# Patient Record
Sex: MALE | Race: WHITE | NOT HISPANIC OR LATINO | Employment: OTHER | URBAN - METROPOLITAN AREA
[De-identification: names, ages, dates, MRNs, and addresses within clinical notes are randomized per-mention and may not be internally consistent; named-entity substitution may affect disease eponyms.]

---

## 2017-03-10 ENCOUNTER — TELEPHONE (OUTPATIENT)
Dept: FAMILY MEDICINE | Facility: CLINIC | Age: 52
End: 2017-03-10

## 2017-03-10 NOTE — TELEPHONE ENCOUNTER
----- Message from Elly Quiroga sent at 3/10/2017 10:10 AM CST -----  Patient stated he is seeing blood in semen and would like appointment today/no appointment showing available/please call patient back at [phone] to schedule or advise.

## 2017-03-10 NOTE — TELEPHONE ENCOUNTER
Pt states blood in semen X few weeks, has not been in office since 2015, scheduled Monday for annual with problem

## 2017-03-13 ENCOUNTER — OFFICE VISIT (OUTPATIENT)
Dept: FAMILY MEDICINE | Facility: CLINIC | Age: 52
End: 2017-03-13

## 2017-03-13 VITALS
TEMPERATURE: 98 F | DIASTOLIC BLOOD PRESSURE: 80 MMHG | SYSTOLIC BLOOD PRESSURE: 130 MMHG | HEIGHT: 72 IN | WEIGHT: 282.63 LBS | BODY MASS INDEX: 38.28 KG/M2

## 2017-03-13 DIAGNOSIS — R36.1 HEMATOSPERMIA: Primary | ICD-10-CM

## 2017-03-13 DIAGNOSIS — E78.00 PURE HYPERCHOLESTEROLEMIA: ICD-10-CM

## 2017-03-13 DIAGNOSIS — E66.09 NON MORBID OBESITY DUE TO EXCESS CALORIES: ICD-10-CM

## 2017-03-13 PROCEDURE — 99999 PR PBB SHADOW E&M-EST. PATIENT-LVL III: CPT | Mod: PBBFAC,,, | Performed by: FAMILY MEDICINE

## 2017-03-13 PROCEDURE — 99214 OFFICE O/P EST MOD 30 MIN: CPT | Mod: S$PBB,,, | Performed by: FAMILY MEDICINE

## 2017-03-13 PROCEDURE — 99213 OFFICE O/P EST LOW 20 MIN: CPT | Mod: PBBFAC,PO | Performed by: FAMILY MEDICINE

## 2017-03-13 NOTE — PATIENT INSTRUCTIONS
If blood in the semen persists over the next few weeks, let us know and we will send you to urology

## 2017-03-13 NOTE — PROGRESS NOTES
Subjective:       Patient ID: David Aguilera is a 51 y.o. male.    Chief Complaint: Blood in semen (blood in semen episodic x 1 mo. Has been riding bike couple day/wk x 6 wk.)    HPI Comments: He is here for evaluation of blood in his semen.  He is noticed it several times over the past month.  There is been no pain.  He did have some perineal pain after bicycle ride several weeks ago.  He says he had a urinary infection several years ago.  He did see urology in 2009 for a vasectomy which was complicated by a left scrotal hematoma.  He has been in fairly good health.  He has gained some weight.  He has a history of hyperlipidemia.  He is due for lab work.    Review of Systems   Constitutional: Negative for unexpected weight change.   Respiratory: Negative for cough and shortness of breath.    Cardiovascular: Negative for chest pain.   Gastrointestinal: Negative for abdominal distention and abdominal pain.   Genitourinary: Negative for decreased urine volume, frequency, penile pain, testicular pain and urgency.       Objective:     Blood pressure 130/80, temperature 97.9 °F (36.6 °C), temperature source Oral, height 6' (1.829 m), weight 128.2 kg (282 lb 10.1 oz).      Physical Exam   Constitutional:   He is overweight.  No distress.   Cardiovascular: Normal rate, regular rhythm and normal heart sounds.    Pulmonary/Chest: Effort normal. No respiratory distress.   Abdominal: Soft.   No CVA or suprapubic tenderness.   Genitourinary:   Genitourinary Comments: Testes are normal.  Nontender.  Normal cord.  No hernia.  Urethral meatus is normal.  Poss state exam a small nontender with no nodules.   Neurological: He is alert.   Psychiatric: He has a normal mood and affect.       Assessment:       1. Hematospermia    2. Non morbid obesity due to excess calories    3. Pure hypercholesterolemia        Plan:       Urinalysis and lab work.  Consider urology evaluation if the medicine for me at does not resolve.  I tried to  reassure him.

## 2017-03-13 NOTE — MR AVS SNAPSHOT
[facility]  [address]  [place] [address]  Phone: [phone]  Fax: [phone]                  David Aguilera   3/13/2017 10:30 AM   Office Visit    Description:  Male : 1965   Provider:  Lobo Arroyo MD   Department:  [facility]           Reason for Visit     Blood in semen           Diagnoses this Visit        Comments    Hematospermia    -  Primary     Non morbid obesity due to excess calories         Pure hypercholesterolemia                To Do List           Goals (5 Years of Data)     None      [facility] On Call     [facility] On Call Nurse Care Line -  Assistance  Registered nurses in the [facility] On Call Center provide clinical advisement, health education, appointment booking, and other advisory services.  Call for this free service at [phone].             Medications           Message regarding Medications     Verify the changes and/or additions to your medication regime listed below are the same as discussed with your clinician today.  If any of these changes or additions are incorrect, please notify your healthcare provider.             Verify that the below list of medications is an accurate representation of the medications you are currently taking.  If none reported, the list may be blank. If incorrect, please contact your healthcare provider. Carry this list with you in case of emergency.           Current Medications     amphetamine-dextroamphetamine (AMPHETAMINE-DEXTROAMPHETAMINE) 30 mg Tab Take 1 tablet by mouth 2 (two) times daily.           Clinical Reference Information           Your Vitals Were     BP Temp Height Weight BMI    130/80 (BP Location: Right arm) 97.9 °F (36.6 °C) (Oral) 6' (1.829 m) 128.2 kg (282 lb 10.1 oz) 38.33 kg/m2      Blood Pressure          Most Recent Value    BP  130/80      Allergies as of 3/13/2017     No Known Allergies      Immunizations Administered on Date of Encounter - 3/13/2017      None      Orders Placed During Today's Visit     Future Labs/Procedures Expected by Expires    CBC Without Differential  3/13/2017 3/14/2018    Comprehensive metabolic panel  3/13/2017 3/14/2018    Lipid panel  3/13/2017 3/14/2018    Urinalysis  3/13/2017 5/12/2018      Instructions    If blood in the semen persists over the next few weeks, let us know and we will send you to urology       Language Assistance Services     ATTENTION: Language assistance services are available, free of charge. Please call [phone].      ATENCIÓN: Si habla español, tiene a steinberg disposición servicios gratuitos de asistencia lingüística. Llame al [phone].     CHÚ Ý: N?u b?n nói Ti?ng Vi?t, có các d?ch v? h? tr? ngôn ng? mi?n phí dành cho b?n. G?i s? [phone].         [facility] complies with applicable Federal civil rights laws and does not discriminate on the basis of race, color, national origin, age, disability, or sex.

## 2017-03-14 ENCOUNTER — LAB VISIT (OUTPATIENT)
Dept: LAB | Facility: HOSPITAL | Age: 52
End: 2017-03-14
Attending: FAMILY MEDICINE

## 2017-03-14 DIAGNOSIS — R36.1 HEMATOSPERMIA: ICD-10-CM

## 2017-03-14 LAB
BILIRUB UR QL STRIP: NEGATIVE
CLARITY UR: CLEAR
COLOR UR: YELLOW
GLUCOSE UR QL STRIP: NEGATIVE
HGB UR QL STRIP: ABNORMAL
KETONES UR QL STRIP: NEGATIVE
LEUKOCYTE ESTERASE UR QL STRIP: NEGATIVE
NITRITE UR QL STRIP: NEGATIVE
PH UR STRIP: 6 [PH] (ref 5–8)
PROT UR QL STRIP: NEGATIVE
SP GR UR STRIP: 1.02 (ref 1–1.03)
URN SPEC COLLECT METH UR: ABNORMAL

## 2017-03-14 PROCEDURE — 81003 URINALYSIS AUTO W/O SCOPE: CPT | Mod: PO

## 2017-03-20 ENCOUNTER — TELEPHONE (OUTPATIENT)
Dept: FAMILY MEDICINE | Facility: CLINIC | Age: 52
End: 2017-03-20

## 2017-03-20 DIAGNOSIS — R36.1 HEMATOSPERMIA: Primary | ICD-10-CM

## 2017-03-20 NOTE — TELEPHONE ENCOUNTER
appt scheduled and pt will call around to see if he can find something sooner. And will call if referral is needed.

## 2017-03-20 NOTE — TELEPHONE ENCOUNTER
Pt informed of labs results and informed pt that he should be receiving a letter in the mail  Pt also states that he still has blood in his semen

## 2017-03-20 NOTE — TELEPHONE ENCOUNTER
----- Message from Elly Hernandez sent at 3/20/2017  8:38 AM CDT -----  Patient is calling for lab test results. Please call patient back at  [phone].                . Thank you.

## 2018-05-21 DIAGNOSIS — Z12.11 COLON CANCER SCREENING: ICD-10-CM

## 2018-08-28 ENCOUNTER — OFFICE VISIT (OUTPATIENT)
Dept: FAMILY MEDICINE | Facility: CLINIC | Age: 53
End: 2018-08-28
Payer: COMMERCIAL

## 2018-08-28 VITALS
BODY MASS INDEX: 32.83 KG/M2 | WEIGHT: 242.38 LBS | HEIGHT: 72 IN | SYSTOLIC BLOOD PRESSURE: 120 MMHG | HEART RATE: 68 BPM | DIASTOLIC BLOOD PRESSURE: 72 MMHG

## 2018-08-28 DIAGNOSIS — H10.9 BACTERIAL CONJUNCTIVITIS OF LEFT EYE: Primary | ICD-10-CM

## 2018-08-28 PROCEDURE — 3008F BODY MASS INDEX DOCD: CPT | Mod: CPTII,S$GLB,, | Performed by: INTERNAL MEDICINE

## 2018-08-28 PROCEDURE — 99999 PR PBB SHADOW E&M-EST. PATIENT-LVL III: CPT | Mod: PBBFAC,,, | Performed by: INTERNAL MEDICINE

## 2018-08-28 PROCEDURE — 99213 OFFICE O/P EST LOW 20 MIN: CPT | Mod: S$GLB,,, | Performed by: INTERNAL MEDICINE

## 2018-08-28 RX ORDER — ERYTHROMYCIN 5 MG/G
OINTMENT OPHTHALMIC 3 TIMES DAILY
Qty: 1 G | Refills: 0 | Status: SHIPPED | OUTPATIENT
Start: 2018-08-28 | End: 2018-09-05 | Stop reason: ALTCHOICE

## 2018-08-28 NOTE — PROGRESS NOTES
Assessment and Plan:    1. Bacterial conjunctivitis of left eye  Appearance and history consistent with bacterial conjunctivitis. Discussed infection prevention, use of erythromycin ointment.   - erythromycin (ROMYCIN) ophthalmic ointment; Place into the left eye 3 (three) times daily.  Dispense: 1 g; Refill: 0      ______________________________________________________________________  Subjective:    Chief Complaint:  Possible pink eye    HPI:  David is a 52 y.o. year old man here for evaluation of possible pink eye. He is new to me but an established patient of Dr. Arroyo.     He notes that he has been having red eye on the left associated with significant thick discharge. Discharge has continued throughout the day. Notes that he has been having a sore throat and runny nose leading up to this. He has not had any visual disturbance or pain in his eye. He has not had any photophobia, no diplopia.     Medications:  Current Outpatient Medications on File Prior to Visit   Medication Sig Dispense Refill    amphetamine-dextroamphetamine (AMPHETAMINE-DEXTROAMPHETAMINE) 30 mg Tab Take 1 tablet by mouth 3 (three) times daily.        No current facility-administered medications on file prior to visit.        Review of Systems:  Review of Systems   Constitutional: Negative for chills and fever.   HENT: Positive for congestion and sore throat. Negative for sinus pressure and sinus pain.    Eyes: Positive for discharge and redness. Negative for photophobia, pain, itching and visual disturbance.   Respiratory: Negative for cough, shortness of breath and wheezing.        Past Medical History:  Past Medical History:   Diagnosis Date    ADD (attention deficit disorder with hyperactivity) 4/15/2013       Objective:    Vitals:  Vitals:    08/28/18 1419   BP: 120/72   Pulse: 68   Weight: 109.9 kg (242 lb 6.3 oz)   Height: 6' (1.829 m)   PainSc: 0-No pain       Physical Exam   Constitutional: He is oriented to person, place, and time.  He appears well-developed and well-nourished. No distress.   HENT:   Mouth/Throat: Oropharynx is clear and moist.   Eyes: EOM are normal. Pupils are equal, round, and reactive to light. Right eye exhibits no discharge. Left eye exhibits discharge (thick, yellow/green).   Cardiovascular: Normal rate and regular rhythm.   Pulmonary/Chest: Effort normal. No respiratory distress.   Neurological: He is alert and oriented to person, place, and time.   Skin: Skin is warm and dry.   Psychiatric: He has a normal mood and affect. His behavior is normal.   Vitals reviewed.        Li Rg MD  Internal Medicine

## 2018-09-05 ENCOUNTER — OFFICE VISIT (OUTPATIENT)
Dept: FAMILY MEDICINE | Facility: CLINIC | Age: 53
End: 2018-09-05
Payer: COMMERCIAL

## 2018-09-05 VITALS
BODY MASS INDEX: 32.76 KG/M2 | WEIGHT: 241.88 LBS | HEIGHT: 72 IN | HEART RATE: 64 BPM | SYSTOLIC BLOOD PRESSURE: 124 MMHG | DIASTOLIC BLOOD PRESSURE: 78 MMHG

## 2018-09-05 DIAGNOSIS — Z00.00 PREVENTATIVE HEALTH CARE: Primary | ICD-10-CM

## 2018-09-05 DIAGNOSIS — R36.1 HEMATOSPERMIA: ICD-10-CM

## 2018-09-05 LAB
ALBUMIN SERPL BCP-MCNC: 4 G/DL
ALP SERPL-CCNC: 73 U/L
ALT SERPL W/O P-5'-P-CCNC: 32 U/L
ANION GAP SERPL CALC-SCNC: 6 MMOL/L
AST SERPL-CCNC: 16 U/L
BILIRUB SERPL-MCNC: 0.9 MG/DL
BILIRUB SERPL-MCNC: NORMAL MG/DL
BLOOD URINE, POC: 50
BUN SERPL-MCNC: 18 MG/DL
CALCIUM SERPL-MCNC: 9.5 MG/DL
CHLORIDE SERPL-SCNC: 104 MMOL/L
CHOLEST SERPL-MCNC: 190 MG/DL
CHOLEST/HDLC SERPL: 3.1 {RATIO}
CO2 SERPL-SCNC: 29 MMOL/L
COLOR, POC UA: NORMAL
COMPLEXED PSA SERPL-MCNC: 0.28 NG/ML
CREAT SERPL-MCNC: 0.9 MG/DL
EST. GFR  (AFRICAN AMERICAN): >60 ML/MIN/1.73 M^2
EST. GFR  (NON AFRICAN AMERICAN): >60 ML/MIN/1.73 M^2
GLUCOSE SERPL-MCNC: 79 MG/DL
GLUCOSE UR QL STRIP: NORMAL
HDLC SERPL-MCNC: 62 MG/DL
HDLC SERPL: 32.6 %
KETONES UR QL STRIP: NORMAL
LDLC SERPL CALC-MCNC: 114.2 MG/DL
LEUKOCYTE ESTERASE URINE, POC: NORMAL
NITRITE, POC UA: NORMAL
NONHDLC SERPL-MCNC: 128 MG/DL
PH, POC UA: 5
POTASSIUM SERPL-SCNC: 4.5 MMOL/L
PROT SERPL-MCNC: 6.9 G/DL
PROTEIN, POC: NORMAL
SODIUM SERPL-SCNC: 139 MMOL/L
SPECIFIC GRAVITY, POC UA: 1.03
TRIGL SERPL-MCNC: 69 MG/DL
TSH SERPL DL<=0.005 MIU/L-ACNC: 2.22 UIU/ML
UROBILINOGEN, POC UA: NORMAL

## 2018-09-05 PROCEDURE — 90715 TDAP VACCINE 7 YRS/> IM: CPT | Mod: S$GLB,,, | Performed by: INTERNAL MEDICINE

## 2018-09-05 PROCEDURE — 99396 PREV VISIT EST AGE 40-64: CPT | Mod: 25,S$GLB,, | Performed by: INTERNAL MEDICINE

## 2018-09-05 PROCEDURE — 80053 COMPREHEN METABOLIC PANEL: CPT

## 2018-09-05 PROCEDURE — 81002 URINALYSIS NONAUTO W/O SCOPE: CPT | Mod: S$GLB,,, | Performed by: INTERNAL MEDICINE

## 2018-09-05 PROCEDURE — 87491 CHLMYD TRACH DNA AMP PROBE: CPT

## 2018-09-05 PROCEDURE — 80061 LIPID PANEL: CPT

## 2018-09-05 PROCEDURE — 90472 IMMUNIZATION ADMIN EACH ADD: CPT | Mod: S$GLB,,, | Performed by: INTERNAL MEDICINE

## 2018-09-05 PROCEDURE — 87086 URINE CULTURE/COLONY COUNT: CPT

## 2018-09-05 PROCEDURE — 84443 ASSAY THYROID STIM HORMONE: CPT

## 2018-09-05 PROCEDURE — 90686 IIV4 VACC NO PRSV 0.5 ML IM: CPT | Mod: S$GLB,,, | Performed by: INTERNAL MEDICINE

## 2018-09-05 PROCEDURE — 90471 IMMUNIZATION ADMIN: CPT | Mod: S$GLB,,, | Performed by: INTERNAL MEDICINE

## 2018-09-05 PROCEDURE — 99999 PR PBB SHADOW E&M-EST. PATIENT-LVL IV: CPT | Mod: PBBFAC,,, | Performed by: INTERNAL MEDICINE

## 2018-09-05 PROCEDURE — 84153 ASSAY OF PSA TOTAL: CPT

## 2018-09-05 NOTE — PROGRESS NOTES
Assessment and Plan:    1. Preventative health care  Discussed age appropriate preventative healthcare items including avoidance of tobacco, excessive alcohol consumption, and illicit drugs. Discussed safe sex practices. Discussed appropriate use of sunscreen, seatbelts, etc. Discussed maintenance of a healthy weight.   UTD on vaccines after today.   Due for colonoscopy per his report.   - Case request GI: COLONOSCOPY  - Tdap Vaccine  - Comprehensive metabolic panel; Future  - Lipid panel; Future  - CBC auto differential; Future  - TSH; Future  - PSA, Screening; Future  -  2. Hematospermia  Discussed that hematospermia is generally benign, but as he is having this now for the second time, definitely recommend following up with Urology. No evidence of prostatitis clinically on exam today. Will send UA, culture, GC/CT, PSA, and have patient follow up with Urology.   - PSA, Screening; Future  - POCT URINE DIPSTICK WITHOUT MICROSCOPE  - C. trachomatis/N. gonorrhoeae by AMP DNA [facility]; Urine  - Ambulatory referral to Urology  - Urine culture        ______________________________________________________________________  Subjective:    Chief Complaint:  Annual exam, hematospermia    HPI:  David is a 52 y.o. year old man here for annual exam.     He takes Adderall per Dr. Wagner Arguello (psych), relatively high dose.     He has not had an annual exam in more than a year. He is generally active, swims a lot. Diet has been suboptimal, notes that he does not eat fruit or vegetables at all. He has been trying to lower caloric intake. He has lost about 60 lbs intentionally in the last year or so.     He notes that when he was last seen by Dr. Arroyo he had hematospermia, but this had resolved. In the last few weeks he has been having blood in ejaculate again. No pain, no dysuria. No blood in urine at all. Also has bright red blood from his urethra whenever he has a firm bowel movement. He did not ever follow up with Urology.      Past Medical History:  Past Medical History:   Diagnosis Date    ADD (attention deficit disorder with hyperactivity) 4/15/2013       Past Surgical History:  Past Surgical History:   Procedure Laterality Date    L knee scope      L shoulder surgery      VASECTOMY         Family History:  Family History   Problem Relation Age of Onset    Stroke Mother 76       Social History:  Social History     Socioeconomic History    Marital status:      Spouse name: None    Number of children: None    Years of education: None    Highest education level: None   Social Needs    Financial resource strain: None    Food insecurity - worry: None    Food insecurity - inability: None    Transportation needs - medical: None    Transportation needs - non-medical: None   Occupational History    None   Tobacco Use    Smoking status: Never Smoker    Smokeless tobacco: Never Used   Substance and Sexual Activity    Alcohol use: No    Drug use: No    Sexual activity: Yes     Partners: Female   Other Topics Concern    None   Social History Narrative    None       Medications:  Current Outpatient Medications on File Prior to Visit   Medication Sig Dispense Refill    amphetamine-dextroamphetamine (AMPHETAMINE-DEXTROAMPHETAMINE) 30 mg Tab Take 1 tablet by mouth 3 (three) times daily.       [DISCONTINUED] erythromycin (ROMYCIN) ophthalmic ointment Place into the left eye 3 (three) times daily. 1 g 0     No current facility-administered medications on file prior to visit.        Allergies:  Patient has no known allergies.    Immunizations:  Immunization History   Administered Date(s) Administered    Influenza - Quadrivalent - PF 09/05/2018    Tdap 09/05/2018       Review of Systems:  Review of Systems   Constitutional: Negative for chills and fever.   Respiratory: Negative for shortness of breath and wheezing.    Cardiovascular: Negative for chest pain and leg swelling.   Gastrointestinal: Positive for constipation.  Negative for abdominal pain, anal bleeding, blood in stool and diarrhea.   Genitourinary: Negative for difficulty urinating, discharge, dysuria, flank pain, frequency, genital sores, hematuria, penile pain, scrotal swelling, testicular pain and urgency.   Neurological: Negative for dizziness, seizures, syncope and light-headedness.       Objective:    Vitals:  Vitals:    09/05/18 1120   BP: 124/78   Pulse: 64   Weight: 109.7 kg (241 lb 13.5 oz)   Height: 6' (1.829 m)   PainSc: 0-No pain     Body mass index is 32.8 kg/m².      Physical Exam   Constitutional: He is oriented to person, place, and time. He appears well-developed and well-nourished. No distress.   HENT:   Mouth/Throat: Oropharynx is clear and moist. No oropharyngeal exudate.   Eyes: Conjunctivae are normal. Right eye exhibits no discharge. Left eye exhibits no discharge. No scleral icterus.   Neck: Neck supple. No tracheal deviation present. No thyromegaly present.   Cardiovascular: Normal rate, regular rhythm, normal heart sounds and intact distal pulses.   No murmur heard.  Pulmonary/Chest: Effort normal and breath sounds normal. No respiratory distress. He has no wheezes. He has no rales.   Abdominal: Soft. Bowel sounds are normal. He exhibits no distension and no mass. There is no tenderness. No hernia.   Genitourinary: Prostate normal. Prostate is not enlarged and not tender.   Musculoskeletal: He exhibits no edema.   Lymphadenopathy:     He has no cervical adenopathy.   Neurological: He is alert and oriented to person, place, and time.   Skin: Skin is warm and dry. He is not diaphoretic.   Psychiatric: He has a normal mood and affect. His behavior is normal. Judgment normal.   Calm and cooperative   Vitals reviewed.      Data:  Previous labs reviewed and pertinent for elevated total cholesterol.        Li Rg MD  Internal Medicine

## 2018-09-06 ENCOUNTER — TELEPHONE (OUTPATIENT)
Dept: FAMILY MEDICINE | Facility: CLINIC | Age: 53
End: 2018-09-06

## 2018-09-06 ENCOUNTER — LAB VISIT (OUTPATIENT)
Dept: LAB | Facility: HOSPITAL | Age: 53
End: 2018-09-06
Attending: INTERNAL MEDICINE
Payer: COMMERCIAL

## 2018-09-06 DIAGNOSIS — Z00.00 BLOOD TESTS FOR ROUTINE GENERAL PHYSICAL EXAMINATION: ICD-10-CM

## 2018-09-06 DIAGNOSIS — Z00.00 BLOOD TESTS FOR ROUTINE GENERAL PHYSICAL EXAMINATION: Primary | ICD-10-CM

## 2018-09-06 LAB
BASOPHILS # BLD AUTO: 0.03 K/UL
BASOPHILS NFR BLD: 0.3 %
C TRACH DNA SPEC QL NAA+PROBE: NOT DETECTED
DIFFERENTIAL METHOD: NORMAL
EOSINOPHIL # BLD AUTO: 0.1 K/UL
EOSINOPHIL NFR BLD: 1.4 %
ERYTHROCYTE [DISTWIDTH] IN BLOOD BY AUTOMATED COUNT: 12.5 %
HCT VFR BLD AUTO: 47.8 %
HGB BLD-MCNC: 15.5 G/DL
IMM GRANULOCYTES # BLD AUTO: 0.02 K/UL
IMM GRANULOCYTES NFR BLD AUTO: 0.2 %
LYMPHOCYTES # BLD AUTO: 2 K/UL
LYMPHOCYTES NFR BLD: 20.7 %
MCH RBC QN AUTO: 30.2 PG
MCHC RBC AUTO-ENTMCNC: 32.4 G/DL
MCV RBC AUTO: 93 FL
MONOCYTES # BLD AUTO: 0.8 K/UL
MONOCYTES NFR BLD: 8.3 %
N GONORRHOEA DNA SPEC QL NAA+PROBE: NOT DETECTED
NEUTROPHILS # BLD AUTO: 6.6 K/UL
NEUTROPHILS NFR BLD: 69.1 %
NRBC BLD-RTO: 0 /100 WBC
PLATELET # BLD AUTO: 219 K/UL
PMV BLD AUTO: 10.2 FL
RBC # BLD AUTO: 5.14 M/UL
WBC # BLD AUTO: 9.53 K/UL

## 2018-09-06 PROCEDURE — 85025 COMPLETE CBC W/AUTO DIFF WBC: CPT

## 2018-09-06 PROCEDURE — 36415 COLL VENOUS BLD VENIPUNCTURE: CPT | Mod: PN

## 2018-09-06 NOTE — TELEPHONE ENCOUNTER
Ronny with lab services advised that CBC had clotted, do you want redrawn at this time? Please advise

## 2018-09-07 ENCOUNTER — LAB VISIT (OUTPATIENT)
Dept: LAB | Facility: HOSPITAL | Age: 53
End: 2018-09-07
Attending: INTERNAL MEDICINE
Payer: COMMERCIAL

## 2018-09-07 ENCOUNTER — TELEPHONE (OUTPATIENT)
Dept: FAMILY MEDICINE | Facility: CLINIC | Age: 53
End: 2018-09-07

## 2018-09-07 DIAGNOSIS — R68.82 DECREASED LIBIDO: ICD-10-CM

## 2018-09-07 DIAGNOSIS — R68.82 DECREASED LIBIDO: Primary | ICD-10-CM

## 2018-09-07 LAB
BACTERIA UR CULT: NO GROWTH
TESTOST SERPL-MCNC: 272 NG/DL

## 2018-09-07 PROCEDURE — 84403 ASSAY OF TOTAL TESTOSTERONE: CPT

## 2018-09-07 PROCEDURE — 36415 COLL VENOUS BLD VENIPUNCTURE: CPT | Mod: PN

## 2018-09-07 NOTE — TELEPHONE ENCOUNTER
Will order testosterone lab, but please inform patient that I follow evidence based guidelines for testosterone supplementation.

## 2018-09-07 NOTE — TELEPHONE ENCOUNTER
----- Message from Khoi Rider LPN sent at 9/7/2018  2:25 PM CDT -----  Patient notified of results and would like to have testosterone checked. States that he asked for that specifically. Advised that message will be sent

## 2018-09-10 ENCOUNTER — OFFICE VISIT (OUTPATIENT)
Dept: UROLOGY | Facility: CLINIC | Age: 53
End: 2018-09-10
Payer: COMMERCIAL

## 2018-09-10 VITALS
SYSTOLIC BLOOD PRESSURE: 112 MMHG | BODY MASS INDEX: 33.68 KG/M2 | DIASTOLIC BLOOD PRESSURE: 70 MMHG | WEIGHT: 248.69 LBS | HEIGHT: 72 IN | HEART RATE: 76 BPM

## 2018-09-10 DIAGNOSIS — R79.89 LOW TESTOSTERONE LEVEL IN MALE: ICD-10-CM

## 2018-09-10 DIAGNOSIS — N40.0 BPH WITHOUT URINARY OBSTRUCTION: ICD-10-CM

## 2018-09-10 DIAGNOSIS — R36.1 HEMATOSPERMIA: Primary | ICD-10-CM

## 2018-09-10 LAB
BILIRUB SERPL-MCNC: NORMAL MG/DL
BLOOD URINE, POC: NORMAL
COLOR, POC UA: YELLOW
GLUCOSE UR QL STRIP: NORMAL
KETONES UR QL STRIP: NORMAL
LEUKOCYTE ESTERASE URINE, POC: NORMAL
NITRITE, POC UA: NORMAL
PH, POC UA: 7.5
PROTEIN, POC: NORMAL
SPECIFIC GRAVITY, POC UA: 1.02
UROBILINOGEN, POC UA: 2

## 2018-09-10 PROCEDURE — 3008F BODY MASS INDEX DOCD: CPT | Mod: CPTII,S$GLB,, | Performed by: UROLOGY

## 2018-09-10 PROCEDURE — 99999 PR PBB SHADOW E&M-EST. PATIENT-LVL III: CPT | Mod: PBBFAC,,, | Performed by: UROLOGY

## 2018-09-10 PROCEDURE — 96372 THER/PROPH/DIAG INJ SC/IM: CPT | Mod: S$GLB,,, | Performed by: UROLOGY

## 2018-09-10 PROCEDURE — 81002 URINALYSIS NONAUTO W/O SCOPE: CPT | Mod: S$GLB,,, | Performed by: UROLOGY

## 2018-09-10 PROCEDURE — 99204 OFFICE O/P NEW MOD 45 MIN: CPT | Mod: 25,S$GLB,, | Performed by: UROLOGY

## 2018-09-10 RX ORDER — TESTOSTERONE CYPIONATE 200 MG/ML
200 INJECTION, SOLUTION INTRAMUSCULAR
Status: COMPLETED | OUTPATIENT
Start: 2018-09-10 | End: 2018-09-10

## 2018-09-10 RX ORDER — TESTOSTERONE CYPIONATE 200 MG/ML
200 INJECTION, SOLUTION INTRAMUSCULAR
Qty: 10 ML | Refills: 2 | Status: SHIPPED | OUTPATIENT
Start: 2018-09-10 | End: 2018-12-10 | Stop reason: SDUPTHER

## 2018-09-10 RX ADMIN — TESTOSTERONE CYPIONATE 200 MG: 200 INJECTION, SOLUTION INTRAMUSCULAR at 02:09

## 2018-09-10 NOTE — PROGRESS NOTES
Testosterone 200 mg injection given, see MARS. Educated patient about Adverse Medication Reaction and instructed patient to wait for 15 minutes after injection.  No adverse reactions reported

## 2018-09-10 NOTE — PROGRESS NOTES
Subjective:       Patient ID: David Aguilera is a 52 y.o. male.    Chief Complaint: Advice Only    HPI     52 year old with hematospermia beginning last week.  He says the semen was bright red in color.  He had a similar episode last year.  He has no other symptoms.   He denies gross hematuria and dysuria.  He has no obstructive symptoms.  He has no family history of prostate cancer.   Last PSA 0.28.   He also complains of symptoms of low testosterone.  GUMARO 8/10 positive.  He complains of weak erection.  Baseline testosterone is 272.  Urine dipstick shows negative for all components.      Component PSA, SCREEN   Latest Ref Rng & Units 0.00 - 4.00 ng/mL   9/5/2018 0.28       Past Medical History:   Diagnosis Date    ADD (attention deficit disorder with hyperactivity) 4/15/2013     Past Surgical History:   Procedure Laterality Date    L knee scope      L shoulder surgery      VASECTOMY         Current Outpatient Medications:     amphetamine-dextroamphetamine (AMPHETAMINE-DEXTROAMPHETAMINE) 30 mg Tab, Take 1 tablet by mouth 3 (three) times daily. , Disp: , Rfl:     testosterone cypionate (DEPOTESTOTERONE CYPIONATE) 200 mg/mL injection, Inject 1 mL (200 mg total) into the muscle every 14 (fourteen) days., Disp: 10 mL, Rfl: 2  No current facility-administered medications for this visit.     Review of Systems   Constitutional: Negative for fever.   Eyes: Negative for visual disturbance.   Respiratory: Negative for shortness of breath.    Cardiovascular: Negative for chest pain.   Gastrointestinal: Negative for nausea.   Genitourinary: Negative for dysuria and hematuria.   Musculoskeletal: Negative for gait problem.   Skin: Negative for rash.   Neurological: Negative for seizures.   Psychiatric/Behavioral: Negative for confusion.       Objective:      Physical Exam   Constitutional: He is oriented to person, place, and time. He appears well-developed and well-nourished.   HENT:   Head: Normocephalic and atraumatic.    Eyes: Conjunctivae are normal.   Cardiovascular: Normal rate.   Pulmonary/Chest: Effort normal.   Abdominal: Hernia confirmed negative in the right inguinal area and confirmed negative in the left inguinal area.   Genitourinary: Testes normal and penis normal. Rectal exam shows no mass and anal tone normal. Prostate is enlarged (30g, s/s/a). Prostate is not tender.   Musculoskeletal: Normal range of motion. He exhibits no edema.   Lymphadenopathy: No inguinal adenopathy noted on the right or left side.   Neurological: He is alert and oriented to person, place, and time.   Skin: Skin is warm and dry. No rash noted.   Psychiatric: He has a normal mood and affect.   Vitals reviewed.      Assessment:       1. Hematospermia    2. Low testosterone level in male    3. BPH without urinary obstruction        Plan:       Hematospermia  -     POCT URINE DIPSTICK WITHOUT MICROSCOPE    Low testosterone level in male  -     Testosterone; Future; Expected date: 12/11/2018  -     Hemoglobin; Future; Expected date: 12/11/2018  -     Hematocrit; Future; Expected date: 12/11/2018    BPH without urinary obstruction    Other orders  -     testosterone cypionate injection 200 mg; Inject 1 mL (200 mg total) into the muscle every 14 (fourteen) days.  -     testosterone cypionate (DEPOTESTOTERONE CYPIONATE) 200 mg/mL injection; Inject 1 mL (200 mg total) into the muscle every 14 (fourteen) days.  Dispense: 10 mL; Refill: 2      Reassurance re hematospermia, no treatment necessary  He wants to begin a trial of testosterone replacement.  Begin 200mg IM every 2 weeks.  RTC 3 months with repeat labs

## 2018-09-10 NOTE — LETTER
September 10, 2018      Li Rg MD  [address]  [facility] [address]           [facility]  [address] [facility] [address] [place] [address]  Phone: [phone]          Patient: David Aguilera   MR Number: [id number]   YOB: 1965   Date of Visit: 9/10/2018       Dear Dr. Li Rg:    Thank you for referring David Aguilera to me for evaluation. Attached you will find relevant portions of my assessment and plan of care.    If you have questions, please do not hesitate to call me. I look forward to following David Aguilera along with you.    Sincerely,    EDWIN Romero MD    Enclosure  CC:  No Recipients    If you would like to receive this communication electronically, please contact [email or web address] or [phone] to request more information on [facility] access.    For providers and/or their staff who would like to refer a patient to [facility], please contact us through our one-stop-shop provider referral line, [facility], at [phone].    If you feel you have received this communication in error or would no longer like to receive these types of communications, please e-mail [email or web address]

## 2018-11-01 ENCOUNTER — OFFICE VISIT (OUTPATIENT)
Dept: FAMILY MEDICINE | Facility: CLINIC | Age: 53
End: 2018-11-01
Payer: COMMERCIAL

## 2018-11-01 VITALS
BODY MASS INDEX: 33.22 KG/M2 | SYSTOLIC BLOOD PRESSURE: 120 MMHG | WEIGHT: 245.25 LBS | HEIGHT: 72 IN | OXYGEN SATURATION: 97 % | TEMPERATURE: 99 F | HEART RATE: 78 BPM | DIASTOLIC BLOOD PRESSURE: 78 MMHG

## 2018-11-01 DIAGNOSIS — L03.114 CELLULITIS OF LEFT UPPER EXTREMITY: ICD-10-CM

## 2018-11-01 DIAGNOSIS — L23.7 POISON IVY DERMATITIS: Primary | ICD-10-CM

## 2018-11-01 PROCEDURE — 96372 THER/PROPH/DIAG INJ SC/IM: CPT | Mod: S$GLB,,, | Performed by: NURSE PRACTITIONER

## 2018-11-01 PROCEDURE — 99214 OFFICE O/P EST MOD 30 MIN: CPT | Mod: 25,S$GLB,, | Performed by: NURSE PRACTITIONER

## 2018-11-01 PROCEDURE — 99999 PR PBB SHADOW E&M-EST. PATIENT-LVL IV: CPT | Mod: PBBFAC,,, | Performed by: NURSE PRACTITIONER

## 2018-11-01 PROCEDURE — 3008F BODY MASS INDEX DOCD: CPT | Mod: CPTII,S$GLB,, | Performed by: NURSE PRACTITIONER

## 2018-11-01 RX ORDER — PREDNISONE 20 MG/1
TABLET ORAL
Qty: 15 TABLET | Refills: 0 | Status: SHIPPED | OUTPATIENT
Start: 2018-11-01 | End: 2018-11-11

## 2018-11-01 RX ORDER — SULFAMETHOXAZOLE AND TRIMETHOPRIM 800; 160 MG/1; MG/1
1 TABLET ORAL 2 TIMES DAILY
Qty: 14 TABLET | Refills: 0 | Status: SHIPPED | OUTPATIENT
Start: 2018-11-01 | End: 2018-11-02

## 2018-11-01 RX ORDER — DEXAMETHASONE SODIUM PHOSPHATE 4 MG/ML
4 INJECTION, SOLUTION INTRA-ARTICULAR; INTRALESIONAL; INTRAMUSCULAR; INTRAVENOUS; SOFT TISSUE
Status: COMPLETED | OUTPATIENT
Start: 2018-11-01 | End: 2018-11-01

## 2018-11-01 RX ADMIN — DEXAMETHASONE SODIUM PHOSPHATE 4 MG: 4 INJECTION, SOLUTION INTRA-ARTICULAR; INTRALESIONAL; INTRAMUSCULAR; INTRAVENOUS; SOFT TISSUE at 10:11

## 2018-11-01 NOTE — PROGRESS NOTES
This dictation has been generated using Modal Fluency Dictation some phonetic errors may occur. Please contact author for clarification if needed.     Problem List Items Addressed This Visit     None      Visit Diagnoses     Poison ivy dermatitis    -  Primary    Relevant Medications    dexamethasone injection 4 mg (Completed)    Other Relevant Orders    Ambulatory referral to Allergy    Cellulitis of left upper extremity              Orders Placed This Encounter    Ambulatory referral to Allergy    predniSONE (DELTASONE) 20 MG tablet    sulfamethoxazole-trimethoprim 800-160mg (BACTRIM DS) 800-160 mg Tab    dexamethasone injection 4 mg     Poison ivy dermatitis and secondary cellulitis of the upper extremity.  Refer to allergist.  Patient wants to know about desensitization.  Marked poison ivy noted. Injection as above and follow with prednisone.  Complains of left lid he irritation but I do not see any evidence of rash.  No vision changes no Ophthalmology referral.  Secondary cellulitis developing on the upper extremities treat with Bactrim briefly as above.      Follow-up if symptoms worsen or fail to improve.    ________________________________________________________________  ________________________________________________________________      Chief Complaint   Patient presents with    Poison Ivy     History of present illness  This 53 y.o. presents today for complaint of rash.  Patient is new to me.  Follows upon my partners.  Patient notes presence of poison ivy.  He started about a month and a half ago.  He seems to improve after using a Medrol Dosepak however relapse.  He was working in the [employer].  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  Patient does note some left eye irritation.  He states he had a rash in the affected area previously.  Denies any vision changes.  He did try cleansing the skin after contact with poison ivy.  He also completed all of  Medrol Dosepak with improvement but not resolution.  He has relapse since then.  Review of systems  No fever or chills  No shortness of breath chest pain    Past Medical History:   Diagnosis Date    ADD (attention deficit disorder with hyperactivity) 4/15/2013       Past Surgical History:   Procedure Laterality Date    L knee scope      L shoulder surgery      VASECTOMY         Family History   Problem Relation Age of Onset    Stroke Mother 76       Social History     Socioeconomic History    Marital status:      Spouse name: None    Number of children: None    Years of education: None    Highest education level: None   Social Needs    Financial resource strain: None    Food insecurity - worry: None    Food insecurity - inability: None    Transportation needs - medical: None    Transportation needs - non-medical: None   Occupational History    None   Tobacco Use    Smoking status: Never Smoker    Smokeless tobacco: Never Used   Substance and Sexual Activity    Alcohol use: No    Drug use: No    Sexual activity: Yes     Partners: Female   Other Topics Concern    None   Social History Narrative    None       Current Outpatient Medications   Medication Sig Dispense Refill    amphetamine-dextroamphetamine (AMPHETAMINE-DEXTROAMPHETAMINE) 30 mg Tab Take 1 tablet by mouth 3 (three) times daily.       testosterone cypionate (DEPOTESTOTERONE CYPIONATE) 200 mg/mL injection Inject 1 mL (200 mg total) into the muscle every 14 (fourteen) days. 10 mL 2    predniSONE (DELTASONE) 20 MG tablet Take 1 tablet (20 mg total) by mouth 2 (two) times daily for 5 days, THEN 1 tablet (20 mg total) once daily for 5 days. 15 tablet 0    sulfamethoxazole-trimethoprim 800-160mg (BACTRIM DS) 800-160 mg Tab Take 1 tablet by mouth 2 (two) times daily. for 7 days 14 tablet 0     No current facility-administered medications for this visit.        Review of patient's allergies indicates:  No Known Allergies    Physical  examination  Vitals Reviewed  Gen. Well-dressed well-nourished   Skin warm dry and intact.  Poison ivy rash noted with evidence of excoriation.  It is present on the upper extremities.  There is a trace amount of erythematous changes on the left side of the neck.  I do not appreciate any rash around the left eye.  Neck is supple without adenopathy  Chest.  Respirations are even unlabored.     Neuro. Awake alert oriented x4.  Normal judgment and cognition noted.  Extremities no clubbing cyanosis or edema noted.     Call or return to clinic prn if these symptoms worsen or fail to improve as anticipated.

## 2018-11-05 ENCOUNTER — TELEPHONE (OUTPATIENT)
Dept: GASTROENTEROLOGY | Facility: CLINIC | Age: 53
End: 2018-11-05

## 2018-11-05 NOTE — TELEPHONE ENCOUNTER
----- Message from Markos Mcintosh sent at 11/5/2018  4:55 PM CST -----  Contact: [phone]  Patient stated he's scheduled to get colonoscopy done on 11/6/18, pharmacy have not received prep.      Patient will be using   [facility] - [place] - [address]  [address]  [place] [address]  Phone: [phone] Fax: [phone]    Please call patient at [phone].     Thanks!

## 2018-11-06 ENCOUNTER — ANESTHESIA EVENT (OUTPATIENT)
Dept: ENDOSCOPY | Facility: HOSPITAL | Age: 53
End: 2018-11-06
Payer: COMMERCIAL

## 2018-11-06 ENCOUNTER — ANESTHESIA (OUTPATIENT)
Dept: ENDOSCOPY | Facility: HOSPITAL | Age: 53
End: 2018-11-06
Payer: COMMERCIAL

## 2018-11-06 ENCOUNTER — HOSPITAL ENCOUNTER (OUTPATIENT)
Facility: HOSPITAL | Age: 53
Discharge: HOME | End: 2018-11-06
Attending: INTERNAL MEDICINE | Admitting: INTERNAL MEDICINE
Payer: COMMERCIAL

## 2018-11-06 VITALS
OXYGEN SATURATION: 98 % | BODY MASS INDEX: 32.23 KG/M2 | SYSTOLIC BLOOD PRESSURE: 120 MMHG | TEMPERATURE: 98 F | RESPIRATION RATE: 16 BRPM | DIASTOLIC BLOOD PRESSURE: 70 MMHG | WEIGHT: 238 LBS | HEIGHT: 72 IN | HEART RATE: 62 BPM

## 2018-11-06 DIAGNOSIS — Z12.11 COLON CANCER SCREENING: ICD-10-CM

## 2018-11-06 PROCEDURE — 63600175 PHARM REV CODE 636 W HCPCS: Mod: PO | Performed by: NURSE ANESTHETIST, CERTIFIED REGISTERED

## 2018-11-06 PROCEDURE — 37000009 HC ANESTHESIA EA ADD 15 MINS: Mod: PO | Performed by: INTERNAL MEDICINE

## 2018-11-06 PROCEDURE — G0121 COLON CA SCRN NOT HI RSK IND: HCPCS | Mod: PO | Performed by: INTERNAL MEDICINE

## 2018-11-06 PROCEDURE — 25000003 PHARM REV CODE 250: Mod: PO | Performed by: INTERNAL MEDICINE

## 2018-11-06 PROCEDURE — D9220A PRA ANESTHESIA: Mod: ,,, | Performed by: ANESTHESIOLOGY

## 2018-11-06 PROCEDURE — G0121 COLON CA SCRN NOT HI RSK IND: HCPCS | Mod: ,,, | Performed by: INTERNAL MEDICINE

## 2018-11-06 PROCEDURE — 37000008 HC ANESTHESIA 1ST 15 MINUTES: Mod: PO | Performed by: INTERNAL MEDICINE

## 2018-11-06 RX ORDER — SODIUM CHLORIDE 0.9 % (FLUSH) 0.9 %
3 SYRINGE (ML) INJECTION
Status: DISCONTINUED | OUTPATIENT
Start: 2018-11-06 | End: 2018-11-06 | Stop reason: HOSPADM

## 2018-11-06 RX ORDER — SODIUM CHLORIDE, SODIUM LACTATE, POTASSIUM CHLORIDE, CALCIUM CHLORIDE 600; 310; 30; 20 MG/100ML; MG/100ML; MG/100ML; MG/100ML
INJECTION, SOLUTION INTRAVENOUS CONTINUOUS
Status: DISCONTINUED | OUTPATIENT
Start: 2018-11-06 | End: 2018-11-06 | Stop reason: HOSPADM

## 2018-11-06 RX ORDER — LIDOCAINE HYDROCHLORIDE 10 MG/ML
1 INJECTION INFILTRATION; PERINEURAL ONCE
Status: COMPLETED | OUTPATIENT
Start: 2018-11-06 | End: 2018-11-06

## 2018-11-06 RX ORDER — LIDOCAINE HCL/PF 100 MG/5ML
SYRINGE (ML) INTRAVENOUS
Status: DISCONTINUED | OUTPATIENT
Start: 2018-11-06 | End: 2018-11-06

## 2018-11-06 RX ORDER — PROPOFOL 10 MG/ML
VIAL (ML) INTRAVENOUS
Status: DISCONTINUED | OUTPATIENT
Start: 2018-11-06 | End: 2018-11-06

## 2018-11-06 RX ADMIN — PROPOFOL 30 MG: 10 INJECTION, EMULSION INTRAVENOUS at 10:11

## 2018-11-06 RX ADMIN — LIDOCAINE HYDROCHLORIDE 75 MG: 20 INJECTION, SOLUTION INTRAVENOUS at 10:11

## 2018-11-06 RX ADMIN — LIDOCAINE HYDROCHLORIDE 1 ML: 10 INJECTION, SOLUTION EPIDURAL; INFILTRATION; INTRACAUDAL; PERINEURAL at 10:11

## 2018-11-06 RX ADMIN — PROPOFOL 30 MG: 10 INJECTION, EMULSION INTRAVENOUS at 11:11

## 2018-11-06 RX ADMIN — PROPOFOL 120 MG: 10 INJECTION, EMULSION INTRAVENOUS at 10:11

## 2018-11-06 RX ADMIN — SODIUM CHLORIDE, SODIUM LACTATE, POTASSIUM CHLORIDE, AND CALCIUM CHLORIDE: .6; .31; .03; .02 INJECTION, SOLUTION INTRAVENOUS at 10:11

## 2018-11-06 NOTE — DISCHARGE INSTRUCTIONS
Recovery After Procedural Sedation (Adult)  You have been given medicine by vein to make you sleep during your surgery. This may have included both a pain medicine and sleeping medicine. Most of the effects have worn off. But you may still have some drowsiness for the next 6 to 8 hours.  Home care  Follow these guidelines when you get home:  · For the next 8 hours, you should be watched by a responsible adult. This person should make sure your condition is not getting worse.  · Don't drink any alcohol for the next 24 hours.  · Don't drive, operate dangerous machinery, or make important business or personal decisions during the next 24 hours.  Note: Your healthcare provider may tell you not to take any medicine by mouth for pain or sleep in the next 4 hours. These medicines may react with the medicines you were given in the hospital. This could cause a much stronger response than usual.  Follow-up care  Follow up with your healthcare provider if you are not alert and back to your usual level of activity within 12 hours.  When to seek medical advice  Call your healthcare provider right away if any of these occur:  · Drowsiness gets worse  · Weakness or dizziness gets worse  · Repeated vomiting  · You can't be awakened   Date Last Reviewed: 10/18/2016  © 1396-8668 [employer]. [address] [place] [address], [place] [address]. All rights reserved. This information is not intended as a substitute for professional medical care. Always follow your healthcare professional's instructions.

## 2018-11-06 NOTE — [employer] PATIENT INSTRUCTIONS
Discharge Summary/Instructions after an Endoscopic Procedure  Patient Name: David Aguilera  Patient MRN: [id number]  Patient YOB: 1965  Tuesday, November 06, 2018  Kameron Chadwick MD  RESTRICTIONS:  During your procedure today, you received medications for sedation.  These   medications may affect your judgment, balance and coordination.  Therefore,   for 24 hours, you have the following restrictions:   - DO NOT drive a car, operate machinery, make legal/financial decisions,   sign important papers or drink alcohol.    ACTIVITY:  Today: no heavy lifting, straining or running due to procedural   sedation/anesthesia.  The following day: return to full activity including work.  DIET:  Eat and drink normally unless instructed otherwise.     TREATMENT FOR COMMON SIDE EFFECTS:  - Mild abdominal pain, nausea, belching, bloating or excessive gas:  rest,   eat lightly and use a heating pad.  - Sore Throat: treat with throat lozenges and/or gargle with warm salt   water.  - Because air was used during the procedure, expelling large amounts of air   from your rectum or belching is normal.  - If a bowel prep was taken, you may not have a bowel movement for 1-3 days.    This is normal.  SYMPTOMS TO WATCH FOR AND REPORT TO YOUR PHYSICIAN:  1. Abdominal pain or bloating, other than gas cramps.  2. Chest pain.  3. Back pain.  4. Signs of infection such as: chills or fever occurring within 24 hours   after the procedure.  5. Rectal bleeding, which would show as bright red, maroon, or black stools.   (A tablespoon of blood from the rectum is not serious, especially if   hemorrhoids are present.)  6. Vomiting.  7. Weakness or dizziness.  GO DIRECTLY TO THE NEAREST EMERGENCY ROOM IF YOU HAVE ANY OF THE FOLLOWING:      Difficulty breathing              Chills and/or fever over 101 F   Persistent vomiting and/or vomiting blood   Severe abdominal pain   Severe chest pain   Black, tarry stools   Bleeding- more than one  tablespoon   Any other symptom or condition that you feel may need urgent attention  Your doctor recommends these additional instructions:  If any biopsies were taken, your doctors clinic will contact you in 1 to 2   weeks with any results.  Your physician has recommended a repeat colonoscopy in 10 years for   screening purposes.   You are being discharged to home.  For questions, problems or results please call your physician - Kameron Chadwick MD at Work: [phone].  EMERGENCY PHONE NUMBER: [phone], LAB RESULTS: [phone]  IF A COMPLICATION OR EMERGENCY SITUATION ARISES AND YOU ARE UNABLE TO REACH   YOUR PHYSICIAN - GO DIRECTLY TO THE EMERGENCY ROOM.  ___________________________________________  Nurse Signature  ___________________________________________  Patient/Designated Responsible Party Signature  Kameron Chadwick MD  11/6/2018 11:08:59 AM  This report has been verified and signed electronically.  [employer]

## 2018-11-06 NOTE — DISCHARGE SUMMARY
Discharge Note  Short Stay      SUMMARY     Admit Date: 11/6/2018    Attending Physician: Kameron Chadwick MD     Discharge Physician: Kameron Chadwick MD    Discharge Date: 11/6/2018 11:09 AM    Final Diagnosis: Preventative health care [Z00.00]    Disposition: HOME OR SELF CARE    Patient Instructions:   Current Discharge Medication List      CONTINUE these medications which have NOT CHANGED    Details   dextroamphetamine-amphetamine (AMPHETAMINE SALT COMBO) 30 mg Tab Take 1 tablet by mouth 3 (three) times daily.       predniSONE (DELTASONE) 20 MG tablet Take 1 tablet (20 mg total) by mouth 2 (two) times daily for 5 days, THEN 1 tablet (20 mg total) once daily for 5 days.  Qty: 15 tablet, Refills: 0      testosterone cypionate (DEPOTESTOTERONE CYPIONATE) 200 mg/mL injection Inject 1 mL (200 mg total) into the muscle every 14 (fourteen) days.  Qty: 10 mL, Refills: 2             Discharge Procedure Orders (must include Diet, Follow-up, Activity)    Follow Up:  Follow up with PCP as previously scheduled  Resume routine diet.  Activity as tolerated.    No driving day of procedure.

## 2018-11-06 NOTE — ANESTHESIA PREPROCEDURE EVALUATION
11/06/2018  David Aguilera is a 53 y.o., male.    Anesthesia Evaluation    I have reviewed the Patient Summary Reports.    I have reviewed the Nursing Notes.   I have reviewed the Medications.     Review of Systems  Anesthesia Hx:  Denies Family Hx of Anesthesia complications.   Denies Personal Hx of Anesthesia complications.   Hepatic/GI:   Bowel Prep.    Psych:   Psychiatric History          Physical Exam  General:  Obesity    Airway/Jaw/Neck:  Airway Findings: Mouth Opening: Normal Tongue: Normal  General Airway Assessment: Adult  Mallampati: III  Improves to II with phonation.  TM Distance: Normal, at least 6 cm  Jaw/Neck Findings:  Neck ROM: Normal ROM      Dental:  Dental Findings: upper front caps   Chest/Lungs:  Chest/Lungs Clear    Heart/Vascular:  Heart Findings: Normal            Anesthesia Plan  Type of Anesthesia, risks & benefits discussed:  Anesthesia Type:  general  Patient's Preference:   Intra-op Monitoring Plan: standard ASA monitors  Intra-op Monitoring Plan Comments:   Post Op Pain Control Plan:   Post Op Pain Control Plan Comments:   Induction:   IV  Beta Blocker:  Patient is not currently on a Beta-Blocker (No further documentation required).       Informed Consent: Patient understands risks and agrees with Anesthesia plan.  Questions answered. Anesthesia consent signed with patient.  ASA Score: 2     Day of Surgery Review of History & Physical:    H&P update referred to the provider.         Ready For Surgery From Anesthesia Perspective.

## 2018-11-06 NOTE — H&P
History & Physical - Short Stay  Gastroenterology      SUBJECTIVE:     Procedure: Colonoscopy    Chief Complaint/Indication for Procedure: Screening    PTA Medications   Medication Sig    dextroamphetamine-amphetamine (AMPHETAMINE SALT COMBO) 30 mg Tab Take 1 tablet by mouth 3 (three) times daily.     predniSONE (DELTASONE) 20 MG tablet Take 1 tablet (20 mg total) by mouth 2 (two) times daily for 5 days, THEN 1 tablet (20 mg total) once daily for 5 days.    testosterone cypionate (DEPOTESTOTERONE CYPIONATE) 200 mg/mL injection Inject 1 mL (200 mg total) into the muscle every 14 (fourteen) days.       Review of patient's allergies indicates:  No Known Allergies     Past Medical History:   Diagnosis Date    ADD (attention deficit disorder with hyperactivity) 4/15/2013     Past Surgical History:   Procedure Laterality Date    L knee scope      L shoulder surgery      VASECTOMY       Family History   Problem Relation Age of Onset    Stroke Mother 76     Social History     Tobacco Use    Smoking status: Never Smoker    Smokeless tobacco: Never Used   Substance Use Topics    Alcohol use: No    Drug use: No         OBJECTIVE:     Vital Signs (Most Recent)  Temp: 97.5 °F (36.4 °C) (11/06/18 1034)  Pulse: 65 (11/06/18 1034)  Resp: 16 (11/06/18 1034)  BP: 129/83 (11/06/18 1034)  SpO2: 98 % (11/06/18 1034)    Physical Exam                                                        GENERAL:  Comfortable, in no acute distress.                                 HEENT EXAM:  Nonicteric.  No adenopathy.  Oropharynx is clear.               NECK:  Supple.                                                               LUNGS:  Clear.                                                               CARDIAC:  Regular rate and rhythm.  S1, S2.  No murmur.                      ABDOMEN:  Soft, positive bowel sounds, nontender.  No hepatosplenomegaly or masses.  No rebound or guarding.                                              EXTREMITIES:  No edema.     MENTAL STATUS:  Normal, alert and oriented.      ASSESSMENT/PLAN:     Assessment: Colorectal cancer screening    Plan: Colonoscopy    Anesthesia Plan: General    ASA Grade: ASA 2 - Patient with mild systemic disease with no functional limitations    MALLAMPATI SCORE:  I (soft palate, uvula, fauces, and tonsillar pillars visible)

## 2018-11-06 NOTE — ANESTHESIA POSTPROCEDURE EVALUATION
Anesthesia Post Evaluation    Patient: David A Hingle    Procedure(s) Performed: Procedure(s) (LRB):  COLONOSCOPY (N/A)    Final Anesthesia Type: general  Patient location during evaluation: PACU  Patient participation: Yes- Able to Participate  Level of consciousness: awake and alert, oriented and awake  Post-procedure vital signs: reviewed and stable  Pain management: adequate  Airway patency: patent  PONV status at discharge: No PONV  Anesthetic complications: no      Cardiovascular status: blood pressure returned to baseline and hemodynamically stable  Respiratory status: unassisted, spontaneous ventilation and room air  Hydration status: euvolemic  Follow-up not needed.        Visit Vitals  /70 (BP Location: Left arm)   Pulse 62   Temp 36.6 °C (97.8 °F) (Tympanic)   Resp 16   Ht 6' (1.829 m)   Wt 108 kg (238 lb)   SpO2 98%   BMI 32.28 kg/m²       Pain/Tremaine Score: Presence of Pain: denies (11/6/2018 11:32 AM)  Tremaine Score: 10 (11/6/2018 11:32 AM)

## 2018-12-10 ENCOUNTER — OFFICE VISIT (OUTPATIENT)
Dept: UROLOGY | Facility: CLINIC | Age: 53
End: 2018-12-10
Payer: COMMERCIAL

## 2018-12-10 VITALS
BODY MASS INDEX: 34.31 KG/M2 | WEIGHT: 253 LBS | HEART RATE: 75 BPM | SYSTOLIC BLOOD PRESSURE: 156 MMHG | DIASTOLIC BLOOD PRESSURE: 90 MMHG

## 2018-12-10 DIAGNOSIS — R79.89 LOW TESTOSTERONE IN MALE: Primary | ICD-10-CM

## 2018-12-10 LAB
BILIRUB SERPL-MCNC: ABNORMAL MG/DL
BLOOD URINE, POC: ABNORMAL
COLOR, POC UA: ABNORMAL
GLUCOSE UR QL STRIP: ABNORMAL
KETONES UR QL STRIP: ABNORMAL
LEUKOCYTE ESTERASE URINE, POC: ABNORMAL
NITRITE, POC UA: ABNORMAL
PH, POC UA: 6
PROTEIN, POC: ABNORMAL
SPECIFIC GRAVITY, POC UA: 1025
UROBILINOGEN, POC UA: 0.2

## 2018-12-10 PROCEDURE — 99999 PR PBB SHADOW E&M-EST. PATIENT-LVL III: CPT | Mod: PBBFAC,,, | Performed by: UROLOGY

## 2018-12-10 PROCEDURE — 3008F BODY MASS INDEX DOCD: CPT | Mod: CPTII,S$GLB,, | Performed by: UROLOGY

## 2018-12-10 PROCEDURE — 99213 OFFICE O/P EST LOW 20 MIN: CPT | Mod: 25,S$GLB,, | Performed by: UROLOGY

## 2018-12-10 PROCEDURE — 81002 URINALYSIS NONAUTO W/O SCOPE: CPT | Mod: S$GLB,,, | Performed by: UROLOGY

## 2018-12-10 PROCEDURE — 96372 THER/PROPH/DIAG INJ SC/IM: CPT | Mod: S$GLB,,, | Performed by: UROLOGY

## 2018-12-10 RX ORDER — TESTOSTERONE CYPIONATE 200 MG/ML
200 INJECTION, SOLUTION INTRAMUSCULAR
Qty: 10 ML | Refills: 3 | Status: SHIPPED | OUTPATIENT
Start: 2018-12-10 | End: 2019-06-20 | Stop reason: SDUPTHER

## 2018-12-10 RX ORDER — TESTOSTERONE CYPIONATE 200 MG/ML
50 INJECTION, SOLUTION INTRAMUSCULAR ONCE
Status: COMPLETED | OUTPATIENT
Start: 2018-12-10 | End: 2018-12-10

## 2018-12-10 RX ADMIN — TESTOSTERONE CYPIONATE 50 MG: 200 INJECTION, SOLUTION INTRAMUSCULAR at 03:12

## 2018-12-10 NOTE — PROGRESS NOTES
Pt received 200 mg testosterone injection IM in RUQ of buttocks. Pt tolerated procedure well. Pt refused to stay for observation. Pt verbalized understand of the risk with leaving the clinic.

## 2018-12-10 NOTE — PROGRESS NOTES
Subjective:       Patient ID: David Aguilera is a 53 y.o. male.    Chief Complaint: Follow-up and Injections    HPI     52 year old with complains of symptoms of low testosterone.  GUMARO 8/10 positive.  Baseline testosterone is 272.  He has completed a 3 month trial of testosterone replacement.   Waking earlier but no significant improvement.  No repeat labs.  Last injection > 2 weeks ago  Urine dipstick shows negative for all components.    Review of Systems   Constitutional: Negative for fever.   Genitourinary: Negative for dysuria and hematuria.       Objective:      Physical Exam   Constitutional: He is oriented to person, place, and time. He appears well-developed and well-nourished.   Pulmonary/Chest: Effort normal.   Neurological: He is alert and oriented to person, place, and time.   Skin: No rash noted.   Psychiatric: He has a normal mood and affect.   Vitals reviewed.      Assessment:       1. Low testosterone in male        Plan:       Low testosterone in male  -     POCT URINE DIPSTICK WITHOUT MICROSCOPE    Other orders  -     testosterone cypionate (DEPOTESTOTERONE CYPIONATE) 200 mg/mL injection; Inject 1 mL (200 mg total) into the muscle every 14 (fourteen) days.  Dispense: 10 mL; Refill: 3  -     testosterone cypionate injection 50 mg      Injection today and gets labs in 3 days.

## 2018-12-13 ENCOUNTER — LAB VISIT (OUTPATIENT)
Dept: LAB | Facility: HOSPITAL | Age: 53
End: 2018-12-13
Attending: UROLOGY
Payer: COMMERCIAL

## 2018-12-13 DIAGNOSIS — R79.89 LOW TESTOSTERONE LEVEL IN MALE: ICD-10-CM

## 2018-12-13 LAB
HCT VFR BLD AUTO: 51.9 %
HGB BLD-MCNC: 17.3 G/DL
TESTOST SERPL-MCNC: 684 NG/DL

## 2018-12-13 PROCEDURE — 36415 COLL VENOUS BLD VENIPUNCTURE: CPT | Mod: PO

## 2018-12-13 PROCEDURE — 85014 HEMATOCRIT: CPT

## 2018-12-13 PROCEDURE — 84403 ASSAY OF TOTAL TESTOSTERONE: CPT

## 2018-12-13 PROCEDURE — 85018 HEMOGLOBIN: CPT

## 2019-06-03 ENCOUNTER — OFFICE VISIT (OUTPATIENT)
Dept: FAMILY MEDICINE | Facility: CLINIC | Age: 54
End: 2019-06-03
Payer: COMMERCIAL

## 2019-06-03 ENCOUNTER — HOSPITAL ENCOUNTER (OUTPATIENT)
Dept: RADIOLOGY | Facility: HOSPITAL | Age: 54
Discharge: HOME | End: 2019-06-03
Attending: NURSE PRACTITIONER
Payer: COMMERCIAL

## 2019-06-03 VITALS
WEIGHT: 256.63 LBS | HEIGHT: 72 IN | SYSTOLIC BLOOD PRESSURE: 150 MMHG | BODY MASS INDEX: 34.76 KG/M2 | TEMPERATURE: 98 F | DIASTOLIC BLOOD PRESSURE: 84 MMHG | HEART RATE: 77 BPM | OXYGEN SATURATION: 96 %

## 2019-06-03 DIAGNOSIS — R07.81 RIB PAIN: ICD-10-CM

## 2019-06-03 DIAGNOSIS — R07.81 RIB PAIN: Primary | ICD-10-CM

## 2019-06-03 DIAGNOSIS — L25.9 CONTACT DERMATITIS, UNSPECIFIED CONTACT DERMATITIS TYPE, UNSPECIFIED TRIGGER: ICD-10-CM

## 2019-06-03 PROCEDURE — 71100 X-RAY EXAM RIBS UNI 2 VIEWS: CPT | Mod: 26,LT,, | Performed by: RADIOLOGY

## 2019-06-03 PROCEDURE — 3008F BODY MASS INDEX DOCD: CPT | Mod: CPTII,S$GLB,, | Performed by: NURSE PRACTITIONER

## 2019-06-03 PROCEDURE — 71100 XR RIBS 2 VIEW LEFT: ICD-10-PCS | Mod: 26,LT,, | Performed by: RADIOLOGY

## 2019-06-03 PROCEDURE — 3008F PR BODY MASS INDEX (BMI) DOCUMENTED: ICD-10-PCS | Mod: CPTII,S$GLB,, | Performed by: NURSE PRACTITIONER

## 2019-06-03 PROCEDURE — 71100 X-RAY EXAM RIBS UNI 2 VIEWS: CPT | Mod: TC,PN,LT

## 2019-06-03 PROCEDURE — 99214 OFFICE O/P EST MOD 30 MIN: CPT | Mod: S$GLB,,, | Performed by: NURSE PRACTITIONER

## 2019-06-03 PROCEDURE — 99214 PR OFFICE/OUTPT VISIT, EST, LEVL IV, 30-39 MIN: ICD-10-PCS | Mod: S$GLB,,, | Performed by: NURSE PRACTITIONER

## 2019-06-03 PROCEDURE — 99999 PR PBB SHADOW E&M-EST. PATIENT-LVL IV: CPT | Mod: PBBFAC,,, | Performed by: NURSE PRACTITIONER

## 2019-06-03 PROCEDURE — 99999 PR PBB SHADOW E&M-EST. PATIENT-LVL IV: ICD-10-PCS | Mod: PBBFAC,,, | Performed by: NURSE PRACTITIONER

## 2019-06-03 RX ORDER — IBUPROFEN 800 MG/1
800 TABLET ORAL 3 TIMES DAILY PRN
Qty: 45 TABLET | Refills: 0 | Status: SHIPPED | OUTPATIENT
Start: 2019-06-03 | End: 2019-06-23

## 2019-06-03 RX ORDER — ZOLPIDEM TARTRATE 10 MG/1
TABLET ORAL
COMMUNITY
Start: 2019-03-29 | End: 2022-02-01

## 2019-06-03 RX ORDER — HYDROCORTISONE 25 MG/G
CREAM TOPICAL 2 TIMES DAILY
Qty: 20 G | Refills: 0 | Status: SHIPPED | OUTPATIENT
Start: 2019-06-03 | End: 2022-02-01
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
## 2019-06-04 ENCOUNTER — TELEPHONE (OUTPATIENT)
Dept: FAMILY MEDICINE | Facility: CLINIC | Age: 54
End: 2019-06-04

## 2019-06-04 NOTE — TELEPHONE ENCOUNTER
Spoke w/ pt. Informed pt about results and recommendations per provider. pt verbalized understanding.    Pt will p/u scripts called in for rash and pain.

## 2019-06-04 NOTE — TELEPHONE ENCOUNTER
----- Message from Emiliano Stephenson sent at 6/4/2019 10:27 AM CDT -----  Contact: Pt David [phone]  Patient was inquiring about his CXR from yesterday.  He hasn't gotten the results yet.  Please call the patient @ [phone].    Thank you

## 2019-06-20 RX ORDER — TESTOSTERONE CYPIONATE 200 MG/ML
INJECTION, SOLUTION INTRAMUSCULAR
Qty: 10 ML | Refills: 3 | Status: SHIPPED | OUTPATIENT
Start: 2019-06-20 | End: 2019-12-19 | Stop reason: SDUPTHER

## 2019-08-02 ENCOUNTER — HOSPITAL ENCOUNTER (EMERGENCY)
Facility: HOSPITAL | Age: 54
Discharge: HOME | End: 2019-08-03
Attending: EMERGENCY MEDICINE
Payer: COMMERCIAL

## 2019-08-02 DIAGNOSIS — K52.9 ENTERITIS: Primary | ICD-10-CM

## 2019-08-02 PROCEDURE — 25500020 PHARM REV CODE 255: Performed by: EMERGENCY MEDICINE

## 2019-08-02 PROCEDURE — 96375 TX/PRO/DX INJ NEW DRUG ADDON: CPT

## 2019-08-02 PROCEDURE — 99284 EMERGENCY DEPT VISIT MOD MDM: CPT

## 2019-08-02 PROCEDURE — 96374 THER/PROPH/DIAG INJ IV PUSH: CPT

## 2019-08-02 RX ORDER — HYOSCYAMINE SULFATE 0.12 MG/1
0.12 TABLET SUBLINGUAL
Status: COMPLETED | OUTPATIENT
Start: 2019-08-03 | End: 2019-08-03

## 2019-08-02 RX ADMIN — IOHEXOL 100 ML: 350 INJECTION, SOLUTION INTRAVENOUS at 09:08

## 2019-08-03 VITALS
BODY MASS INDEX: 32.51 KG/M2 | HEART RATE: 98 BPM | HEIGHT: 72 IN | OXYGEN SATURATION: 96 % | SYSTOLIC BLOOD PRESSURE: 160 MMHG | RESPIRATION RATE: 16 BRPM | DIASTOLIC BLOOD PRESSURE: 79 MMHG | WEIGHT: 240 LBS | TEMPERATURE: 100 F

## 2019-08-03 PROCEDURE — 63600175 PHARM REV CODE 636 W HCPCS: Performed by: EMERGENCY MEDICINE

## 2019-08-03 PROCEDURE — 25000003 PHARM REV CODE 250: Performed by: EMERGENCY MEDICINE

## 2019-08-03 RX ORDER — METRONIDAZOLE 500 MG/1
500 TABLET ORAL EVERY 8 HOURS
Qty: 21 TABLET | Refills: 0 | Status: SHIPPED | OUTPATIENT
Start: 2019-08-03 | End: 2019-08-03 | Stop reason: SDUPTHER

## 2019-08-03 RX ORDER — HYOSCYAMINE SULFATE 0.125 MG
125 TABLET ORAL EVERY 6 HOURS PRN
Qty: 15 TABLET | Refills: 0 | Status: SHIPPED | OUTPATIENT
Start: 2019-08-03 | End: 2019-08-03 | Stop reason: SDUPTHER

## 2019-08-03 RX ORDER — ONDANSETRON 4 MG/1
4 TABLET, FILM COATED ORAL EVERY 8 HOURS PRN
Qty: 12 TABLET | Refills: 0 | Status: SHIPPED | OUTPATIENT
Start: 2019-08-03 | End: 2022-02-01

## 2019-08-03 RX ORDER — HYOSCYAMINE SULFATE 0.125 MG
125 TABLET ORAL EVERY 6 HOURS PRN
Qty: 15 TABLET | Refills: 0 | Status: SHIPPED | OUTPATIENT
Start: 2019-08-03 | End: 2022-02-01

## 2019-08-03 RX ORDER — CIPROFLOXACIN 500 MG/1
500 TABLET ORAL EVERY 12 HOURS
Qty: 14 TABLET | Refills: 0 | Status: SHIPPED | OUTPATIENT
Start: 2019-08-03 | End: 2022-02-01

## 2019-08-03 RX ORDER — MORPHINE SULFATE 4 MG/ML
4 INJECTION, SOLUTION INTRAMUSCULAR; INTRAVENOUS
Status: COMPLETED | OUTPATIENT
Start: 2019-08-03 | End: 2019-08-03

## 2019-08-03 RX ORDER — LEVOFLOXACIN 750 MG/1
750 TABLET ORAL DAILY
Status: DISCONTINUED | OUTPATIENT
Start: 2019-08-03 | End: 2019-08-03 | Stop reason: HOSPADM

## 2019-08-03 RX ORDER — METRONIDAZOLE 500 MG/1
500 TABLET ORAL EVERY 8 HOURS
Qty: 21 TABLET | Refills: 0 | Status: SHIPPED | OUTPATIENT
Start: 2019-08-03 | End: 2022-02-01

## 2019-08-03 RX ORDER — METRONIDAZOLE 250 MG/1
500 TABLET ORAL
Status: COMPLETED | OUTPATIENT
Start: 2019-08-03 | End: 2019-08-03

## 2019-08-03 RX ORDER — DEXAMETHASONE SODIUM PHOSPHATE 4 MG/ML
8 INJECTION, SOLUTION INTRA-ARTICULAR; INTRALESIONAL; INTRAMUSCULAR; INTRAVENOUS; SOFT TISSUE
Status: COMPLETED | OUTPATIENT
Start: 2019-08-03 | End: 2019-08-03

## 2019-08-03 RX ADMIN — SODIUM CHLORIDE 1000 ML: 0.9 INJECTION, SOLUTION INTRAVENOUS at 12:08

## 2019-08-03 RX ADMIN — HYOSCYAMINE SULFATE 0.12 MG: 0.12 TABLET ORAL; SUBLINGUAL at 12:08

## 2019-08-03 RX ADMIN — DEXAMETHASONE SODIUM PHOSPHATE 8 MG: 4 INJECTION, SOLUTION INTRAMUSCULAR; INTRAVENOUS at 12:08

## 2019-08-03 RX ADMIN — MORPHINE SULFATE 4 MG: 4 INJECTION INTRAVENOUS at 12:08

## 2019-08-03 RX ADMIN — METRONIDAZOLE 500 MG: 250 TABLET ORAL at 12:08

## 2019-08-03 RX ADMIN — LEVOFLOXACIN 750 MG: 500 TABLET, FILM COATED ORAL at 12:08

## 2019-08-03 NOTE — ED NOTES
Started feeling bad Tuesday, having severe diarrhea, severe pain to right lower abdomen- radiates from belly button to ribs  Fever and chills since Wednesday, denies cough, denies pain with urination  Went to urgent care and was sent here- because of blood in urine and abnormal labs there and wanted to get further evaluated

## 2019-08-03 NOTE — ED PROVIDER NOTES
Encounter Date: 8/2/2019       History     Chief Complaint   Patient presents with    Abdominal Pain     severe epigastric pain; sent from urgent care for CT. L [facility] given IVF and toradol shot while there.      Patient with approximately 5 day history of intermittent crampy abdominal pain described as severe associated with explosive diarrhea No GI bleeding.  Patient has been on diet for approximately 6 months and does have weight loss secondary to intentional dieting.  There is no fever or chills. Patient did see a urgent care clinic in laboratory evaluation was done there.  They sent here for CT scan given persistent abdominal pain. No similar symptoms in the past.  No recent travel.  No sick contacts.        Review of patient's allergies indicates:  No Known Allergies  Past Medical History:   Diagnosis Date    ADD (attention deficit disorder with hyperactivity) 4/15/2013     Past Surgical History:   Procedure Laterality Date    COLONOSCOPY N/A 11/6/2018    Performed by [facility]ameron [facility]wick MD at [facility]    L knee scope      L shoulder surgery      VASECTOMY       Family History   Problem Relation Age of Onset    Stroke Mother 76     Social History     Tobacco Use    Smoking status: Never Smoker    Smokeless tobacco: Never Used   Substance Use Topics    Alcohol use: No    Drug use: No     Review of Systems   Constitutional: Negative for chills and fever.   HENT: Negative for sore throat.    Eyes: Negative for photophobia and visual disturbance.   Respiratory: Negative for shortness of breath.    Cardiovascular: Negative for chest pain.   Gastrointestinal: Positive for abdominal pain and diarrhea. Negative for vomiting.   Genitourinary: Negative for dysuria.   Musculoskeletal: Negative for joint swelling.   Skin: Negative for rash.   Neurological: Negative for weakness and headaches.   Psychiatric/Behavioral: Negative for confusion.       Physical Exam     Initial Vitals [08/02/19 1842]   BP Pulse Resp  Temp SpO2   (!) 171/92 95 14 98.4 °F (36.9 °C) 98 %      MAP       --         Physical Exam    Nursing note and vitals reviewed.  Constitutional: He is not diaphoretic. No distress.   HENT:   Head: Normocephalic and atraumatic.   Eyes: Conjunctivae are normal.   Neck: Normal range of motion.   Cardiovascular: Regular rhythm.   Pulmonary/Chest: Breath sounds normal.   Abdominal: Soft. Bowel sounds are normal.   Mild right lower quadrant abdominal pain. No guarding or rebound   Musculoskeletal: Normal range of motion.   Skin: No rash noted.   Psychiatric: He has a normal mood and affect.         ED Course   Procedures  Labs Reviewed - No data to display       Imaging Results          CT Abdomen Pelvis With Contrast (In process)                  Medical Decision Making:   History:   Old Medical Records: I decided to obtain old medical records.  Old Records Summarized: records from clinic visits and records from another hospital.  Clinical Tests:   Radiological Study: Reviewed  ED Management:  Patient presents with abdominal pain and diarrhea.  Patient now remembers he ate some old bed food 2 days prior to symptoms began.  Patient with diffuse enteritis.  Given the acute nature of symptoms infectious etiology is most likely.  Possible inflammatory bowel disease.  Given marked inflammatory changes will give dose Decadron.  Holding an antibiotics.  Patient to obtain outpatient stool studies.  Discussed with patient and wife about admission.  Patient desires to try outpatient treatment.  Patient is tolerating p.o. with no problems.                      Clinical Impression:       ICD-10-CM ICD-9-CM   1. Enteritis K52.9 558.9                                Valente Lovett MD  08/03/19 0037

## 2019-08-05 ENCOUNTER — OFFICE VISIT (OUTPATIENT)
Dept: GASTROENTEROLOGY | Facility: CLINIC | Age: 54
End: 2019-08-05
Payer: COMMERCIAL

## 2019-08-05 VITALS
HEIGHT: 72 IN | SYSTOLIC BLOOD PRESSURE: 139 MMHG | HEART RATE: 65 BPM | DIASTOLIC BLOOD PRESSURE: 80 MMHG | WEIGHT: 253.5 LBS | BODY MASS INDEX: 34.34 KG/M2

## 2019-08-05 DIAGNOSIS — R93.5 ABNORMAL CT OF THE ABDOMEN: ICD-10-CM

## 2019-08-05 DIAGNOSIS — R10.33 PERIUMBILICAL ABDOMINAL PAIN: Primary | ICD-10-CM

## 2019-08-05 DIAGNOSIS — R19.7 DIARRHEA, UNSPECIFIED TYPE: ICD-10-CM

## 2019-08-05 DIAGNOSIS — R19.4 CHANGE IN BOWEL HABITS: ICD-10-CM

## 2019-08-05 PROCEDURE — 99214 OFFICE O/P EST MOD 30 MIN: CPT | Mod: S$GLB,,, | Performed by: NURSE PRACTITIONER

## 2019-08-05 PROCEDURE — 99999 PR PBB SHADOW E&M-EST. PATIENT-LVL IV: ICD-10-PCS | Mod: PBBFAC,,, | Performed by: NURSE PRACTITIONER

## 2019-08-05 PROCEDURE — 3008F PR BODY MASS INDEX (BMI) DOCUMENTED: ICD-10-PCS | Mod: CPTII,S$GLB,, | Performed by: NURSE PRACTITIONER

## 2019-08-05 PROCEDURE — 99999 PR PBB SHADOW E&M-EST. PATIENT-LVL IV: CPT | Mod: PBBFAC,,, | Performed by: NURSE PRACTITIONER

## 2019-08-05 PROCEDURE — 3008F BODY MASS INDEX DOCD: CPT | Mod: CPTII,S$GLB,, | Performed by: NURSE PRACTITIONER

## 2019-08-05 PROCEDURE — 99214 PR OFFICE/OUTPT VISIT, EST, LEVL IV, 30-39 MIN: ICD-10-PCS | Mod: S$GLB,,, | Performed by: NURSE PRACTITIONER

## 2019-08-05 NOTE — PATIENT INSTRUCTIONS
Uncertain Causes of Diarrhea (Adult)    Diarrhea is when stools are loose and watery. This can be caused by:  · Viral infections  · Bacterial infections  · Food poisoning  · Parasites  · Irritable bowel syndrome (IBS)  · Inflammatory bowel diseases such as ulcerative colitis, Crohn's disease, and celiac disease  · Food intolerance, such as to lactose, the sugar found in milk and milk products  · Reaction to medicines like antibiotics, laxatives, cancer drugs, and antacids  Along with diarrhea, you may also have:  · Abdominal pain and cramping  · Nausea and vomiting  · Loss of bowel control  · Fever and chills  · Bloody stools  In some cases, antibiotics may help to treat diarrhea. You may have a stool sample test. This is done to see what is causing your diarrhea, and if antibiotics will help treat it. The results of a stool sample test may take up to 2 days. The healthcare provider may not give you antibiotics until he or she has the stool test results.  Diarrhea can cause dehydration. This is the loss of too much water and other fluids from the body. When this occurs, body fluid must be replaced. This can be done with oral rehydration solutions. Oral rehydration solutions are available at drugstores and grocery stores without a prescription.  Home care  Follow all instructions given by your healthcare provider. Rest at home for the next 24 hours, or until you feel better. Avoid caffeine, tobacco, and alcohol. These can make diarrhea, cramping, and pain worse.  If taking medicines:  · Dont take over-the-counter diarrhea or nausea medicines unless your healthcare provider tells you to.  · You may use acetaminophen or NSAID medicines like ibuprofen or naproxen to reduce pain and fever. Dont use these if you have chronic liver or kidney disease, or ever had a stomach ulcer or gastrointestinal bleeding. Don't use NSAID medicines if you are already taking one for another condition (like arthritis) or are on daily  aspirin therapy (such as for heart disease or after a stroke). Talk with your healthcare provider first.  · If antibiotics were prescribed, be sure you take them until they are finished. Dont stop taking them even when you feel better. Antibiotics must be taken as a full course.  To prevent the spread of illness:  · Remember that washing with soap and water and using alcohol-based  is the best way to prevent the spread of infection.  · Clean the toilet after each use.  · Wash your hands before eating.  · Wash your hands before and after preparing food. Keep in mind that people with diarrhea or vomiting should not prepare food for others.  · Wash your hands after using cutting boards, countertops, and knives that have been in contact with raw foods.  · Wash and then peel fruits and vegetables.  · Keep uncooked meats away from cooked and ready-to-eat foods.  · Use a food thermometer when cooking. Cook poultry to at least 165°F (74°C). Cook ground meat (beef, veal, pork, lamb) to at least 160°F (71°C). Cook fresh beef, veal, lamb, and pork to at least 145°F (63°C).  · Dont eat raw or undercooked eggs (poached or janet side up), poultry, meat, or unpasteurized milk and juices.  Food and drinks  The main goal while treating vomiting or diarrhea is to prevent dehydration. This is done by taking small amounts of liquids often.  · Keep in mind that liquids are more important than food right now.  · Drink only small amounts of liquids at a time.  · Dont force yourself to eat, especially if you are having cramping, vomiting, or diarrhea. Dont eat large amounts at a time, even if you are hungry.  · If you eat, avoid fatty, greasy, spicy, or fried foods.  · Dont eat dairy foods or drink milk if you have diarrhea. These can make diarrhea worse.  During the first 24 hours you can try:  · Oral rehydration solutions. Do not use sports drinks. They have too much sugar and not enough electrolytes.  · Soft drinks without  caffeine  · Ginger ale  · Water (plain or flavored)  · Decaf tea or coffee  · Clear broth, consommé, or bouillon  · Gelatin, popsicles, or frozen fruit juice bars  The second 24 hours, if you are feeling better, you can add:  · Hot cereal, plain toast, bread, rolls, or crackers  · Plain noodles, rice, mashed potatoes, chicken noodle soup, or rice soup  · Unsweetened canned fruit (no pineapple)  · Bananas  As you recover:  · Limit fat intake to less than 15 grams per day. Dont eat margarine, butter, oils, mayonnaise, sauces, gravies, fried foods, peanut butter, meat, poultry, or fish.  · Limit fiber. Dont eat raw or cooked vegetables, fresh fruits except bananas, or bran cereals.  · Limit caffeine and chocolate.  · Limit dairy.  · Dont use spices or seasonings except salt.  · Go back to your normal diet over time, as you feel better and your symptoms improve.  · If the symptoms come back, go back to a simple diet or clear liquids.  Follow-up care  Follow up with your healthcare provider, or as advised. If a stool sample was taken or cultures were done, call the healthcare provider for the results as instructed.  Call 911  Call 911 if you have any of these symptoms:  · Trouble breathing  · Confusion  · Extreme drowsiness or trouble walking  · Loss of consciousness  · Rapid heart rate  · Chest pain  · Stiff neck  · Seizure  When to seek medical advice  Call your healthcare provider right away if any of these occur:  · Abdominal pain that gets worse  · Constant lower right abdominal pain  · Continued vomiting and inability to keep liquids down  · Diarrhea more than 5 times a day  · Blood in vomit or stool  · Dark urine or no urine for 8 hours, dry mouth and tongue, tiredness, weakness, or dizziness  · Drowsiness  · New rash  · You dont get better in 2 to 3 days  · Fever of 100.4°F (38°C) or higher that doesnt get lower with medicine  Date Last Reviewed: 1/3/2016  © 2245-1208 [employer]. [address] [place] [address]  [place] [address]. All rights reserved. This information is not intended as a substitute for professional medical care. Always follow your healthcare professional's instructions.        Abdominal Pain    Abdominal pain is pain in the stomach or belly area. Everyone has this pain from time to time. In many cases it goes away on its own. But abdominal pain can sometimes be due to a serious problem, such as appendicitis. So its important to know when to seek help.  Causes of abdominal pain  There are many possible causes of abdominal pain. Common causes in adults include:  Constipation, diarrhea, or gas  Stomach acid flowing back up into the esophagus (acid reflux or heartburn)  Severe acid reflux, called GERD (gastroesophageal reflux disease)  A sore in the lining of the stomach or small intestine (peptic ulcer)  Inflammation of the gallbladder, liver, or pancreas  Gallstones or kidney stones  Appendicitis   Intestinal blockage   An internal organ pushing through a muscle or other tissue (hernia)  Urinary tract infections  In women, menstrual cramps, fibroids, or endometriosis  Inflammation or infection of the intestines  Diagnosing the cause of abdominal pain  Your healthcare provider will do a physical exam help find the cause of your pain. If needed, tests will be ordered. Belly pain has many possible causes. So it can be hard to find the reason for your pain. Giving details about your pain can help. Tell your provider where and when you feel the pain, and what makes it better or worse. Also let your provider know if you have other symptoms such as:  Fever  Tiredness  Upset stomach (nausea)  Vomiting  Changes in bathroom habits  Treating abdominal pain  Some causes of pain need emergency medical treatment right away. These include appendicitis or a bowel blockage. Other problems can be treated with rest, fluids, or medicines. Your healthcare provider can give you specific instructions for treatment or  self-care based on what is causing your pain.  If you have vomiting or diarrhea, sip water or other clear fluids. When you are ready to eat solid foods again, start with small amounts of easy-to-digest, low-fat foods. These include apple sauce, toast, or crackers.   When to seek medical care  Call 911 or go to the hospital right away if you:  Cant pass stool and are vomiting  Are vomiting blood or have bloody diarrhea or black, tarry diarrhea  Have chest, neck, or shoulder pain  Feel like you might pass out  Have pain in your shoulder blades with nausea  Have sudden, severe belly pain  Have new, severe pain unlike any you have felt before  Have a belly that is rigid, hard, and tender to touch  Call your healthcare provider if you have:  Pain for more than 5 days  Bloating for more than 2 days  Diarrhea for more than 5 days  A fever of 100.4°F (38.0°C) or higher, or as directed by your provider  Pain that gets worse  Weight loss for no reason  Continued lack of appetite  Blood in your stool  How to prevent abdominal pain  Here are some tips to help prevent abdominal pain:  Eat smaller amounts of food at one time.  Avoid greasy, fried, or other high-fat foods.  Avoid foods that give you gas.  Exercise regularly.  Drink plenty of fluids.  To help prevent GERD symptoms:  Quit smoking.  Reduce alcohol and certain foods that increase stomach acid.  Avoid aspirin and over-the-counter pain and fever medicines (NSAIDS or nonsteroidal anti-inflammatory drugs), if possible  Lose extra weight.  Finish eating at least 2 hours before you go to bed or lie down.  Raise the head of your bed.  Date Last Reviewed: 7/1/2016  © 5403-1182 [employer]. [address] [place] [address], [place] [address]. All rights reserved. This information is not intended as a substitute for professional medical care. Always follow your healthcare professional's instructions.

## 2019-08-05 NOTE — PROGRESS NOTES
Subjective:       Patient ID: David Aguilera is a 53 y.o. male, Body mass index is 34.38 kg/m².    Chief Complaint: Follow-up (er colitis)      Patient is new to me. Established patient of Dr. Chadwick.  F/U ED visit on 8/2/19 for abdominal pain and diarrhea.    Abdominal Pain   This is a new problem. The current episode started in the past 7 days (Started 7/30/19). The onset quality is sudden. The problem occurs constantly. Duration: lasts for hours. The problem has been gradually improving. The pain is located in the periumbilical region. The pain is at a severity of 3/10. The pain is mild. The quality of the pain is aching (sore). The abdominal pain radiates to the RLQ and epigastric region. Associated symptoms include belching, diarrhea and a fever (low grade; highest temp recorded is 100 degrees). Pertinent negatives include no constipation, dysuria, hematochezia, melena, nausea, vomiting or weight loss. The pain is aggravated by eating (and drinking ). Relieved by: not eating; sleeping. He has tried antibiotics (Currently on: Cipro, Flagyl, and Levsin; Past: Beano and Tums) for the symptoms. The treatment provided moderate relief. Prior diagnostic workup includes CT scan.   Diarrhea    This is a new problem. The current episode started in the past 7 days (Started 7/30/19). The problem occurs less than 2 times per day (resolved 7/31/19; has had two semi-formed bowel movements since then). The problem has been resolved. The stool consistency is described as watery. The patient states that diarrhea does not awaken him from sleep. Associated symptoms include abdominal pain (Chief complaint) and a fever (low grade; highest temp recorded is 100 degrees). Pertinent negatives include no coughing, vomiting or weight loss. Nothing aggravates the symptoms. There are no known risk factors (denies recent hospitalization, antibiotics, or foreign travel). He has tried nothing for the symptoms. The treatment provided moderate  relief.     Review of Systems   Constitutional: Positive for fever (low grade; highest temp recorded is 100 degrees). Negative for appetite change, unexpected weight change and weight loss.   HENT: Negative for trouble swallowing.    Respiratory: Negative for cough and shortness of breath.    Cardiovascular: Negative for chest pain.   Gastrointestinal: Positive for abdominal pain (Chief complaint) and diarrhea. Negative for blood in stool, constipation, hematochezia, melena, nausea and vomiting.        Increased belching   Genitourinary: Negative for difficulty urinating and dysuria.   Skin: Negative for rash.   Neurological: Negative for speech difficulty.   Psychiatric/Behavioral: Negative for confusion.       Past Medical History:   Diagnosis Date    ADD (attention deficit disorder with hyperactivity) 4/15/2013      Past Surgical History:   Procedure Laterality Date    COLONOSCOPY N/A 11/6/2018    [facility] at [facility]    L knee scope      L shoulder surgery      VASECTOMY        Family History   Problem Relation Age of Onset    Stroke Mother 76      Wt Readings from Last 10 Encounters:   08/05/19 115 kg (253 lb 8.5 oz)   08/02/19 108.9 kg (240 lb)   06/03/19 116.4 kg (256 lb 9.9 oz)   12/10/18 114.8 kg (253 lb)   11/06/18 108 kg (238 lb)   11/01/18 111.3 kg (245 lb 4.2 oz)   09/10/18 112.8 kg (248 lb 10.9 oz)   09/05/18 109.7 kg (241 lb 13.5 oz)   08/28/18 109.9 kg (242 lb 6.3 oz)   03/13/17 128.2 kg (282 lb 10.1 oz)     Lab Results   Component Value Date    WBC 9.53 09/06/2018    HGB 17.3 12/13/2018    HCT 51.9 12/13/2018    MCV 93 09/06/2018     09/06/2018     CMP  Sodium   Date Value Ref Range Status   09/05/2018 139 136 - 145 mmol/L Final     Potassium   Date Value Ref Range Status   09/05/2018 4.5 3.5 - 5.1 mmol/L Final     Chloride   Date Value Ref Range Status   09/05/2018 104 95 - 110 mmol/L Final     CO2   Date Value Ref Range Status   09/05/2018 29 23 - 29 mmol/L  Final     Glucose   Date Value Ref Range Status   09/05/2018 79 70 - 110 mg/dL Final     BUN, Bld   Date Value Ref Range Status   09/05/2018 18 6 - 20 mg/dL Final     Creatinine   Date Value Ref Range Status   09/05/2018 0.9 0.5 - 1.4 mg/dL Final     Calcium   Date Value Ref Range Status   09/05/2018 9.5 8.7 - 10.5 mg/dL Final     Total Protein   Date Value Ref Range Status   09/05/2018 6.9 6.0 - 8.4 g/dL Final     Albumin   Date Value Ref Range Status   09/05/2018 4.0 3.5 - 5.2 g/dL Final     Total Bilirubin   Date Value Ref Range Status   09/05/2018 0.9 0.1 - 1.0 mg/dL Final     Comment:     For infants and newborns, interpretation of results should be based  on gestational age, weight and in agreement with clinical  observations.  Premature Infant recommended reference ranges:  Up to 24 hours.............<8.0 mg/dL  Up to 48 hours............<12.0 mg/dL  3-5 days..................<15.0 mg/dL  6-29 days.................<15.0 mg/dL       Alkaline Phosphatase   Date Value Ref Range Status   09/05/2018 73 55 - 135 U/L Final     AST   Date Value Ref Range Status   09/05/2018 16 10 - 40 U/L Final     ALT   Date Value Ref Range Status   09/05/2018 32 10 - 44 U/L Final     Anion Gap   Date Value Ref Range Status   09/05/2018 6 (L) 8 - 16 mmol/L Final     eGFR if    Date Value Ref Range Status   09/05/2018 >60.0 >60 mL/min/1.73 m^2 Final     eGFR if non    Date Value Ref Range Status   09/05/2018 >60.0 >60 mL/min/1.73 m^2 Final     Comment:     Calculation used to obtain the estimated glomerular filtration  rate (eGFR) is the CKD-EPI equation.        Lab Results   Component Value Date    AMYLASE 47 02/27/2006     Lab Results   Component Value Date    LIPASE 13 02/27/2006               Reviewed prior medical records including radiology report of CT abdomen and pelvis 8/2/19 & endoscopy history (see surgical history).     Objective:      Physical Exam   Constitutional: He is oriented to  person, place, and time. He appears well-developed and well-nourished.   HENT:   Head: Normocephalic.   Eyes: Pupils are equal, round, and reactive to light.   Neck: Normal range of motion.   Cardiovascular: Normal rate, regular rhythm and normal heart sounds.   No murmur heard.  Pulmonary/Chest: Breath sounds normal. No respiratory distress. He has no wheezes.   Abdominal: Soft. Bowel sounds are normal. He exhibits no distension. There is tenderness in the right lower quadrant, epigastric area and periumbilical area.   Musculoskeletal: Normal range of motion.   Neurological: He is alert and oriented to person, place, and time.   Skin: Skin is warm and dry. No rash noted.   Non jaundiced   Psychiatric: He has a normal mood and affect. His speech is normal.         Assessment:       1. Periumbilical abdominal pain    2. Change in bowel habits    3. Diarrhea, unspecified type    4. Abnormal CT of the abdomen           Plan:   All diagnoses and orders for this visit:    Periumbilical abdominal pain   - Continue Levsin 0.125 mg every 6 hours PRN as directed   - schedule Colonoscopy in 6-8 weeks, discussed procedure with the patient, including risks and benefits, patient verbalized understanding    Diarrhea, unspecified type & Change in bowel habits  -     Clostridium difficile EIA; Future; Expected date: 08/05/2019  -     Stool Exam-Ova,Cysts,Parasites; Future; Expected date: 08/05/2019  -     Stool culture; Future; Expected date: 08/05/2019  -     Giardia / Cryptosporidum, EIA; Future; Expected date: 08/05/2019  -     Occult blood x 1, stool; Future; Expected date: 08/05/2019  -     WBC, Stool; Future; Expected date: 08/05/2019  -     Calprotectin; Future; Expected date: 08/05/2019  -     Rotavirus antigen, stool; Future; Expected date: 08/05/2019  -     Adenovirus Molecular Detection, PCR, Non-Blood Stool; Future; Expected date: 08/05/2019  - Schedule Colonoscopy in 6-8 weeks, discussed procedure with the patient,  including risks and benefits, patient verbalized understanding    Abnormal CT of the abdomen    If no improvement in symptoms or symptoms worsen, call/follow-up at clinic or go to ER

## 2019-08-08 ENCOUNTER — TELEPHONE (OUTPATIENT)
Dept: GASTROENTEROLOGY | Facility: CLINIC | Age: 54
End: 2019-08-08

## 2019-08-08 NOTE — TELEPHONE ENCOUNTER
Left message for pt to return call to schedule c-scope ordered by MARKOS Munroe NP, with Dr. Chadwick. Number provided.

## 2019-08-14 ENCOUNTER — TELEPHONE (OUTPATIENT)
Dept: GASTROENTEROLOGY | Facility: CLINIC | Age: 54
End: 2019-08-14

## 2019-08-14 NOTE — TELEPHONE ENCOUNTER
Pt ready to schedule colonoscopy w/ Dr Chadwick. It was recommended on ofc note from 8/5 to have in 6- 8 weeks . Pt wants it done in [place] end of September . pls call to sched .--lp

## 2019-08-14 NOTE — TELEPHONE ENCOUNTER
----- Message from Sabino Wilcox sent at 8/14/2019  9:16 AM CDT -----  Contact: same  Patient called in and stated he saw Dr. Munroe yesterday 8/13/19 in [place] but is ready to schedule his colonoscopy but wants it done in [place].  Patient would like a call back at [phone]

## 2019-12-19 RX ORDER — TESTOSTERONE CYPIONATE 200 MG/ML
INJECTION, SOLUTION INTRAMUSCULAR
Qty: 2 ML | Refills: 0 | Status: SHIPPED | OUTPATIENT
Start: 2019-12-19 | End: 2020-01-27

## 2020-01-27 RX ORDER — TESTOSTERONE CYPIONATE 200 MG/ML
INJECTION, SOLUTION INTRAMUSCULAR
Qty: 2 ML | Refills: 0 | Status: SHIPPED | OUTPATIENT
Start: 2020-01-27 | End: 2020-02-18

## 2020-02-06 ENCOUNTER — TELEPHONE (OUTPATIENT)
Dept: UROLOGY | Facility: CLINIC | Age: 55
End: 2020-02-06

## 2020-02-06 NOTE — TELEPHONE ENCOUNTER
Spoke to pt and he has blood in the semen for the last few weeks. Pt states it occurs every time. Please advise

## 2020-02-06 NOTE — TELEPHONE ENCOUNTER
----- Message from Mady Chavez sent at 2/6/2020 12:26 PM CST -----  Contact: [phone] or   Pt has been having bleeding on and off and needs to see dr murry, preferrably tomorrow 02 07 2020

## 2020-02-07 ENCOUNTER — PATIENT OUTREACH (OUTPATIENT)
Dept: ADMINISTRATIVE | Facility: OTHER | Age: 55
End: 2020-02-07

## 2020-02-18 DIAGNOSIS — R79.89 LOW TESTOSTERONE IN MALE: Primary | ICD-10-CM

## 2020-02-18 RX ORDER — TESTOSTERONE CYPIONATE 200 MG/ML
INJECTION, SOLUTION INTRAMUSCULAR
Qty: 2 ML | Refills: 0 | Status: SHIPPED | OUTPATIENT
Start: 2020-02-18 | End: 2020-02-20 | Stop reason: SDUPTHER

## 2020-02-18 NOTE — TELEPHONE ENCOUNTER
----- Message from Alexandra Keating sent at 2/18/2020  1:37 PM CST -----  Contact: pt  Type:  RX Refill Request    Who Called:  pt  Refill or New Rx: refill  RX Name and Strength:  testosterone cypionate (DEPOTESTOTERONE CYPIONATE) 200   How is the patient currently taking it? (ex. 1XDay):   Is this a 30 day or 90 day RX:   Preferred Pharmacy with phone number: [facility]  Local or Mail Order: local  Ordering Provider:    Best Call Back Number:  [phone]  Additional Information:  Pt is calling for a refill testosterone cypionate (DEPOTESTOTERONE CYPIONATE) 200

## 2020-02-20 ENCOUNTER — TELEPHONE (OUTPATIENT)
Dept: UROLOGY | Facility: CLINIC | Age: 55
End: 2020-02-20

## 2020-02-20 DIAGNOSIS — R79.89 LOW TESTOSTERONE IN MALE: ICD-10-CM

## 2020-02-20 RX ORDER — TESTOSTERONE CYPIONATE 200 MG/ML
INJECTION, SOLUTION INTRAMUSCULAR
Qty: 5 ML | Refills: 0 | Status: SHIPPED | OUTPATIENT
Start: 2020-02-20 | End: 2020-07-20

## 2020-02-20 NOTE — TELEPHONE ENCOUNTER
----- Message from Elly Hernandez sent at 2/20/2020  3:18 PM CST -----  Type:  Pharmacy Calling to Clarify an RX    Name of Caller:  Chintan   Pharmacy Name:    [facility] - [place] - [address]  [address]  [address]  [place] [address]  Phone: [phone] Fax: [phone]  Prescription Name:  testosterone cypionate (DEPOTESTOTERONE CYPIONATE) 200 mg/mL injection 5ml vile  What do they need to clarify?:  Requesting change in prescription for a 10ml vile, this is all the pharmacy has in stock  Best Call Back Number:  [phone]  Additional Information:       Glasses Prescribed:

## 2020-02-20 NOTE — TELEPHONE ENCOUNTER
Mr Ale came to the office and was checking to see if his medication for his Testosterone was refilled.  After reviewing his medication list, his Testosterone Cypionate 200 mg- 1 ml every 14 days, 2 ml dispensed with no refills.  The pt will be going out of town for a month today and will need more refills. He is requesting a multi-dose vial for refills and he will keep his appointment when he returns.

## 2020-03-20 NOTE — TELEPHONE ENCOUNTER
----- Message from Thelma Brionesa sent at 9/6/2018  8:09 AM CDT -----  Regarding: Lab Client Services  Contact: [phone]  Hi my name is Thelma I work in the Lab Client Services. We had a problem with some lab work on this patient. If someone from your office could call us at [phone] or [phone] that would be great. Anyone in my department can help. Thank you      INTERVAL HPI/OVERNIGHT EVENTS:  Intubated at 6 (see chart note from Dr. Fields). NG Tube and jackman placed.     ICU Vital Signs Last 24 Hrs  T(C): 39 (20 Mar 2020 05:00), Max: 39.2 (20 Mar 2020 00:40)  T(F): 102.2 (20 Mar 2020 05:00), Max: 102.6 (20 Mar 2020 00:40)  HR: 91 (20 Mar 2020 06:00) (72 - 93)  BP: 101/55 (19 Mar 2020 21:20) (96/54 - 136/89)  BP(mean): 73 (19 Mar 2020 21:20) (70 - 108)  ABP: 116/57 (20 Mar 2020 06:00) (81/75 - 128/63)  ABP(mean): 75 (20 Mar 2020 06:00) (71 - 87)  RR: 30 (20 Mar 2020 06:00) (30 - 38)  SpO2: 100% (20 Mar 2020 06:00) (92% - 100%)      PHYSICAL EXAM:  Constitutional: Intubated and sedated   HEENT: PERRL, EOMI, sclera non-icteric, neck supple, trachea midline, no masses, no JVD, MMM, good dentition  Respiratory: intubated  Cardiovascular: RRR, normal S1S2, no M/R/G  Gastrointestinal: soft, NTND, no masses palpable, BS normal  Extremities: Warm, well perfused, pulses equal bilateral upper and lower extremities, no edema, no clubbing  Neurological: AAOx0, CN Grossly intact  Skin: Normal temperature, bruising in upper thighs    MEDICATIONS  (STANDING):  azithromycin  IVPB 500 milliGRAM(s) IV Intermittent every 24 hours  chlorhexidine 0.12% Liquid 15 milliLiter(s) Oral Mucosa every 12 hours  chlorhexidine 2% Cloths 1 Application(s) Topical <User Schedule>  cisatracurium Infusion 3 MICROgram(s)/kG/Min (11.4 mL/Hr) IV Continuous <Continuous>  dextrose 5%. 1000 milliLiter(s) (50 mL/Hr) IV Continuous <Continuous>  dextrose 50% Injectable 12.5 Gram(s) IV Push once  dextrose 50% Injectable 25 Gram(s) IV Push once  dextrose 50% Injectable 25 Gram(s) IV Push once  enoxaparin Injectable 40 milliGRAM(s) SubCutaneous every 24 hours  fentaNYL   Infusion. 0.5 MICROgram(s)/kG/Hr (3.18 mL/Hr) IV Continuous <Continuous>  hydroxychloroquine 400 milliGRAM(s) Oral every 12 hours  hydroxychloroquine 200 milliGRAM(s) Oral every 12 hours  influenza   Vaccine 0.5 milliLiter(s) IntraMuscular once  insulin lispro (HumaLOG) corrective regimen sliding scale   SubCutaneous every 6 hours  midazolam Infusion 0.02 mG/kG/Hr (1.27 mL/Hr) IV Continuous <Continuous>  norepinephrine Infusion 0.05 MICROgram(s)/kG/Min (5.95 mL/Hr) IV Continuous <Continuous>  oseltamivir Suspension 75 milliGRAM(s) Enteral Tube every 12 hours  pantoprazole   Suspension 40 milliGRAM(s) Enteral Tube daily  petrolatum Ophthalmic Ointment 1 Application(s) Both EYES three times a day  piperacillin/tazobactam IVPB.. 3.375 Gram(s) IV Intermittent every 6 hours  potassium chloride   Powder 40 milliEquivalent(s) Enteral Tube once  propofol Infusion 10 MICROgram(s)/kG/Min (3.81 mL/Hr) IV Continuous <Continuous>  vancomycin  IVPB 1000 milliGRAM(s) IV Intermittent every 12 hours    MEDICATIONS  (PRN):  dextrose 40% Gel 15 Gram(s) Oral once PRN Blood Glucose LESS THAN 70 milliGRAM(s)/deciliter  glucagon  Injectable 1 milliGRAM(s) IntraMuscular once PRN Glucose LESS THAN 70 milligrams/deciliter          Allergies    Ceclor (Unknown)    Intolerances        LABS:                         12.4   9.14  )-----------( 249      ( 19 Mar 2020 09:01 )             37.8     03-19    135  |  106  |  8   ----------------------------<  162<H>  3.4<L>   |  18<L>  |  0.63    Ca    7.4<L>      19 Mar 2020 09:01  Phos  2.7     03-19  Mg     1.8     03-19    TPro  4.7<L>  /  Alb  2.3<L>  /  TBili  0.4  /  DBili  x   /  AST  67<H>  /  ALT  38  /  AlkPhos  36<L>  03-19    PT/INR - ( 18 Mar 2020 16:39 )   PT: 15.7 sec;   INR: 1.36          PTT - ( 18 Mar 2020 16:39 )  PTT:36.2 sec    CARDIAC MARKERS ( 19 Mar 2020 09:01 )  x     / x     / 184 U/L / x     / x            Lactate, Blood: 1.0 mmol/L (03-19 @ 09:00)      RADIOLOGY, EKG & ADDITIONAL TESTS: Reviewed. *** INCOMPLETE ***    OVERNIGHT EVENTS: Overnight, MRSA swab resulted as negative and vancomycin discontinued. Pt febrile (102 rectal), given tylenol IV and suppository.    INTERVAL EVENTS: given lasix 20mg IVP x1, started on jevity 1.5 for nutritional support. ventilator settings adjusted and PEEP decreased to 12 (from 14)    SUBJECTIVE HPI: Patient seen and examined at bedside by fellow and attending      VITAL SIGNS:  Vital Signs Last 24 Hrs  T(C): 37.7 (20 Mar 2020 09:07), Max: 39.2 (20 Mar 2020 00:40)  T(F): 99.8 (20 Mar 2020 09:07), Max: 102.6 (20 Mar 2020 00:40)  HR: 85 (20 Mar 2020 12:00) (79 - 93)  BP: 101/55 (19 Mar 2020 21:20) (101/55 - 106/66)  BP(mean): 73 (19 Mar 2020 21:20) (73 - 81)  RR: 30 (20 Mar 2020 12:00) (30 - 30)  SpO2: 99% (20 Mar 2020 12:00) (96% - 100%)      03-19-20 @ 07:01  -  03-20-20 @ 07:00  --------------------------------------------------------  IN: 3583.5 mL / OUT: 1615 mL / NET: 1968.5 mL    03-20-20 @ 07:01  -  03-20-20 @ 13:38  --------------------------------------------------------  IN: 312.7 mL / OUT: 920 mL / NET: -607.3 mL      PHYSICAL EXAM:  General: Comfortable, pleasant/anxious/agitated, Ill-appearing, well-nourished/frail/cachectic, comfortable / in distress  Neurological: AAOx3, no focal deficits  HEENT: NC/AT; EOMI, PERRL, clear conjunctiva, no nasal or oropharyngeal discharge or exudates, MMM  Neck: supple, no cervical or post-auricular lymphadenopathy  Cardiovascular: +S1/S2, no murmurs/rubs/gallops, RRR  Respiratory: CTA B/L, no diminished breath sounds, no wheezes/rales/rhonchi, no increased work of breathing or accessory muscle use  Gastrointestinal: soft, NT/ND; active BSx4 quadrants  Genitourinary: no suprapubic tenderness, no CVA tenderness  Extremities: WWP; no edema, clubbing or cyanosis  Vascular: 2+ radial, DP/PT pulses B/L  Skin: no rashes  Lines/Drains:       MEDICATIONS:  MEDICATIONS  (STANDING):  ascorbic acid IVPB 1500 milliGRAM(s) IV Intermittent every 6 hours  azithromycin  IVPB 500 milliGRAM(s) IV Intermittent every 24 hours  chlorhexidine 0.12% Liquid 15 milliLiter(s) Oral Mucosa every 12 hours  chlorhexidine 2% Cloths 1 Application(s) Topical <User Schedule>  cisatracurium Infusion 3 MICROgram(s)/kG/Min (11.4 mL/Hr) IV Continuous <Continuous>  dextrose 5%. 1000 milliLiter(s) (50 mL/Hr) IV Continuous <Continuous>  dextrose 50% Injectable 12.5 Gram(s) IV Push once  dextrose 50% Injectable 25 Gram(s) IV Push once  dextrose 50% Injectable 25 Gram(s) IV Push once  enoxaparin Injectable 40 milliGRAM(s) SubCutaneous every 24 hours  fentaNYL   Infusion. 0.5 MICROgram(s)/kG/Hr (3.18 mL/Hr) IV Continuous <Continuous>  hydroxychloroquine 200 milliGRAM(s) Oral every 12 hours  influenza   Vaccine 0.5 milliLiter(s) IntraMuscular once  insulin lispro (HumaLOG) corrective regimen sliding scale   SubCutaneous every 6 hours  midazolam Infusion 0.02 mG/kG/Hr (1.27 mL/Hr) IV Continuous <Continuous>  norepinephrine Infusion 0.05 MICROgram(s)/kG/Min (5.95 mL/Hr) IV Continuous <Continuous>  oseltamivir Suspension 75 milliGRAM(s) Enteral Tube every 12 hours  pantoprazole   Suspension 40 milliGRAM(s) Enteral Tube daily  petrolatum Ophthalmic Ointment 1 Application(s) Both EYES three times a day  piperacillin/tazobactam IVPB.. 3.375 Gram(s) IV Intermittent every 6 hours  propofol Infusion 10 MICROgram(s)/kG/Min (3.81 mL/Hr) IV Continuous <Continuous>  thiamine IVPB 200 milliGRAM(s) IV Intermittent <User Schedule>    MEDICATIONS  (PRN):  dextrose 40% Gel 15 Gram(s) Oral once PRN Blood Glucose LESS THAN 70 milliGRAM(s)/deciliter  glucagon  Injectable 1 milliGRAM(s) IntraMuscular once PRN Glucose LESS THAN 70 milligrams/deciliter      ALLERGIES/INTOLERANCES:  Allergies: Ceclor (Unknown)      LABS:                        12.3   7.62  )-----------( 260      ( 20 Mar 2020 04:14 )             38.6     Manual Differential (03.20.20 @ 04:14)    Auto Neutrophil %: 89.0: Differential percentages must be correlated with absolute numbers for clinical significance. %    Ovalocytes: Slight    Auto Lymphocyte %: 8.0 %    Macrocytosis: Slight    Piedad Cell: Occasional    Anisocytosis: Slight    Red Cell Morphology: Abnormal    Platelet Morphology: Normal    Comment - Hematology: Manual differential performed.    Manual Smear Verification: Performed    Band Neutrophils %: 2.0 %    Myelocytes %: 1.0 %    Smudge Cells: Present      03-20    130<L>  |  103  |  7   ----------------------------<  101<H>  4.9   |  18<L>  |  0.73    Ca    7.6<L>      20 Mar 2020 04:14  Phos  2.5     03-20  Mg     1.8     03-20    TPro  4.7<L>  /  Alb  2.0<L>  /  TBili  0.3  /  DBili  x   /  AST  59<H>  /  ALT  37  /  AlkPhos  40  03-20    PT/INR - ( 18 Mar 2020 16:39 )   PT: 15.7 sec;   INR: 1.36     PTT - ( 18 Mar 2020 16:39 )  PTT:36.2 sec    CAPILLARY BLOOD GLUCOSE  POCT Blood Glucose.: 99 mg/dL (20 Mar 2020 10:56)      CARDIAC MARKERS ( 20 Mar 2020 04:14 )  x     / x     / 77 U/L / x     / x      CARDIAC MARKERS ( 19 Mar 2020 09:01 )  x     / x     / 184 U/L / x     / x          ABG - ( 20 Mar 2020 08:46 )  pH, Arterial: 7.29  pH, Blood: x     /  pCO2: 40    /  pO2: 80    / HCO3: 19    / Base Excess: -7.2  /  SaO2: 95          Microbiology:  Culture - Blood (collected 18 Mar 2020 17:11)  Source: .Blood Blood  Preliminary Report (19 Mar 2020 18:00):  No growth at 1 day.    Culture - Blood (collected 18 Mar 2020 17:11)  Source: .Blood Blood  Preliminary Report (19 Mar 2020 18:00):  No growth at 1 day.      RADIOLOGY, EKG AND ADDITIONAL TESTS: Reviewed. OVERNIGHT EVENTS: Overnight, MRSA swab resulted as negative and vancomycin discontinued. Pt febrile (102 rectal), given tylenol IV and suppository.    INTERVAL EVENTS: given lasix 20mg IVP x1, started on jevity 1.5 for nutritional support. ventilator settings adjusted and PEEP decreased to 12 (from 14)    SUBJECTIVE HPI: Patient seen and examined at bedside by fellow and attending      VITAL SIGNS:  Vital Signs Last 24 Hrs  T(C): 37.7 (20 Mar 2020 09:07), Max: 39.2 (20 Mar 2020 00:40)  T(F): 99.8 (20 Mar 2020 09:07), Max: 102.6 (20 Mar 2020 00:40)  HR: 85 (20 Mar 2020 12:00) (79 - 93)  BP: 101/55 (19 Mar 2020 21:20) (101/55 - 106/66)  BP(mean): 73 (19 Mar 2020 21:20) (73 - 81)  RR: 30 (20 Mar 2020 12:00) (30 - 30)  SpO2: 99% (20 Mar 2020 12:00) (96% - 100%)      03-19-20 @ 07:01  -  03-20-20 @ 07:00  --------------------------------------------------------  IN: 3583.5 mL / OUT: 1615 mL / NET: 1968.5 mL    03-20-20 @ 07:01  -  03-20-20 @ 13:38  --------------------------------------------------------  IN: 312.7 mL / OUT: 920 mL / NET: -607.3 mL      PHYSICAL EXAM: per fellow  General: sedated on ventilator  Neurological: sedated  HEENT: intubated  Respiratory: on ventilator, diminished breath sounds at bases b/l  Gastrointestinal: soft, NT/ND  Extremities: WWP; trace dependent edema      MEDICATIONS:  MEDICATIONS  (STANDING):  ascorbic acid IVPB 1500 milliGRAM(s) IV Intermittent every 6 hours  azithromycin  IVPB 500 milliGRAM(s) IV Intermittent every 24 hours  chlorhexidine 0.12% Liquid 15 milliLiter(s) Oral Mucosa every 12 hours  chlorhexidine 2% Cloths 1 Application(s) Topical <User Schedule>  cisatracurium Infusion 3 MICROgram(s)/kG/Min (11.4 mL/Hr) IV Continuous <Continuous>  dextrose 5%. 1000 milliLiter(s) (50 mL/Hr) IV Continuous <Continuous>  dextrose 50% Injectable 12.5 Gram(s) IV Push once  dextrose 50% Injectable 25 Gram(s) IV Push once  dextrose 50% Injectable 25 Gram(s) IV Push once  enoxaparin Injectable 40 milliGRAM(s) SubCutaneous every 24 hours  fentaNYL   Infusion. 0.5 MICROgram(s)/kG/Hr (3.18 mL/Hr) IV Continuous <Continuous>  hydroxychloroquine 200 milliGRAM(s) Oral every 12 hours  influenza   Vaccine 0.5 milliLiter(s) IntraMuscular once  insulin lispro (HumaLOG) corrective regimen sliding scale   SubCutaneous every 6 hours  midazolam Infusion 0.02 mG/kG/Hr (1.27 mL/Hr) IV Continuous <Continuous>  norepinephrine Infusion 0.05 MICROgram(s)/kG/Min (5.95 mL/Hr) IV Continuous <Continuous>  oseltamivir Suspension 75 milliGRAM(s) Enteral Tube every 12 hours  pantoprazole   Suspension 40 milliGRAM(s) Enteral Tube daily  petrolatum Ophthalmic Ointment 1 Application(s) Both EYES three times a day  piperacillin/tazobactam IVPB.. 3.375 Gram(s) IV Intermittent every 6 hours  propofol Infusion 10 MICROgram(s)/kG/Min (3.81 mL/Hr) IV Continuous <Continuous>  thiamine IVPB 200 milliGRAM(s) IV Intermittent <User Schedule>    MEDICATIONS  (PRN):  dextrose 40% Gel 15 Gram(s) Oral once PRN Blood Glucose LESS THAN 70 milliGRAM(s)/deciliter  glucagon  Injectable 1 milliGRAM(s) IntraMuscular once PRN Glucose LESS THAN 70 milligrams/deciliter      ALLERGIES/INTOLERANCES:  Allergies: Ceclor (Unknown)      LABS:                        12.3   7.62  )-----------( 260      ( 20 Mar 2020 04:14 )             38.6     Manual Differential (03.20.20 @ 04:14)    Auto Neutrophil %: 89.0: Differential percentages must be correlated with absolute numbers for clinical significance. %    Ovalocytes: Slight    Auto Lymphocyte %: 8.0 %    Macrocytosis: Slight    Greene Cell: Occasional    Anisocytosis: Slight    Red Cell Morphology: Abnormal    Platelet Morphology: Normal    Comment - Hematology: Manual differential performed.    Manual Smear Verification: Performed    Band Neutrophils %: 2.0 %    Myelocytes %: 1.0 %    Smudge Cells: Present      03-20    130<L>  |  103  |  7   ----------------------------<  101<H>  4.9   |  18<L>  |  0.73    Ca    7.6<L>      20 Mar 2020 04:14  Phos  2.5     03-20  Mg     1.8     03-20    TPro  4.7<L>  /  Alb  2.0<L>  /  TBili  0.3  /  DBili  x   /  AST  59<H>  /  ALT  37  /  AlkPhos  40  03-20    PT/INR - ( 18 Mar 2020 16:39 )   PT: 15.7 sec;   INR: 1.36     PTT - ( 18 Mar 2020 16:39 )  PTT:36.2 sec    CAPILLARY BLOOD GLUCOSE  POCT Blood Glucose.: 99 mg/dL (20 Mar 2020 10:56)      CARDIAC MARKERS ( 20 Mar 2020 04:14 )  x     / x     / 77 U/L / x     / x      CARDIAC MARKERS ( 19 Mar 2020 09:01 )  x     / x     / 184 U/L / x     / x          ABG - ( 20 Mar 2020 08:46 )  pH, Arterial: 7.29  pH, Blood: x     /  pCO2: 40    /  pO2: 80    / HCO3: 19    / Base Excess: -7.2  /  SaO2: 95          Microbiology:  Culture - Blood (collected 18 Mar 2020 17:11)  Source: .Blood Blood  Preliminary Report (19 Mar 2020 18:00):  No growth at 1 day.    Culture - Blood (collected 18 Mar 2020 17:11)  Source: .Blood Blood  Preliminary Report (19 Mar 2020 18:00):  No growth at 1 day.      RADIOLOGY, EKG AND ADDITIONAL TESTS: Reviewed.

## 2020-04-09 RX ORDER — DOXYCYCLINE 100 MG/1
CAPSULE ORAL
COMMUNITY
Start: 2020-02-11 | End: 2022-02-01

## 2020-04-09 RX ORDER — SULFAMETHOXAZOLE AND TRIMETHOPRIM 800; 160 MG/1; MG/1
TABLET ORAL
COMMUNITY
Start: 2020-03-11 | End: 2022-02-01

## 2020-04-30 ENCOUNTER — PATIENT OUTREACH (OUTPATIENT)
Dept: ADMINISTRATIVE | Facility: OTHER | Age: 55
End: 2020-04-30

## 2020-07-23 ENCOUNTER — TELEPHONE (OUTPATIENT)
Dept: UROLOGY | Facility: CLINIC | Age: 55
End: 2020-07-23

## 2020-07-23 NOTE — TELEPHONE ENCOUNTER
Spoke to pt and getting labs faxed to us from [facility] in [place]. Pt is to call office once he gets [facility]. He will do a virtual visit.

## 2020-07-23 NOTE — TELEPHONE ENCOUNTER
----- Message from Chastity Garcia MA sent at 7/23/2020  1:55 PM CDT -----  Regarding: refill  Refill request  Medication:testosterone cypionate (DEPOTESTOTERONE CYPIONATE) 200 mg/mL injection  Pharmacy and Phone : [facility] [address] [facility] [address] [phone] (Phone)  [phone] (Fax)  Additional Info : Called in the wrong vile size. Requesting all call back.

## 2022-02-01 ENCOUNTER — TELEPHONE (OUTPATIENT)
Dept: FAMILY MEDICINE | Facility: CLINIC | Age: 57
End: 2022-02-01
Payer: COMMERCIAL

## 2022-02-01 ENCOUNTER — LAB VISIT (OUTPATIENT)
Dept: LAB | Facility: HOSPITAL | Age: 57
End: 2022-02-01
Attending: INTERNAL MEDICINE
Payer: COMMERCIAL

## 2022-02-01 ENCOUNTER — OFFICE VISIT (OUTPATIENT)
Dept: RHEUMATOLOGY | Facility: CLINIC | Age: 57
End: 2022-02-01
Payer: COMMERCIAL

## 2022-02-01 ENCOUNTER — TELEPHONE (OUTPATIENT)
Dept: RHEUMATOLOGY | Facility: CLINIC | Age: 57
End: 2022-02-01

## 2022-02-01 VITALS
WEIGHT: 263.13 LBS | BODY MASS INDEX: 35.68 KG/M2 | DIASTOLIC BLOOD PRESSURE: 91 MMHG | SYSTOLIC BLOOD PRESSURE: 163 MMHG

## 2022-02-01 DIAGNOSIS — M19.90 OSTEOARTHRITIS, UNSPECIFIED OSTEOARTHRITIS TYPE, UNSPECIFIED SITE: ICD-10-CM

## 2022-02-01 DIAGNOSIS — M19.90 OSTEOARTHRITIS, UNSPECIFIED OSTEOARTHRITIS TYPE, UNSPECIFIED SITE: Primary | ICD-10-CM

## 2022-02-01 DIAGNOSIS — D75.1 POLYCYTHEMIA: Primary | ICD-10-CM

## 2022-02-01 LAB
ALBUMIN SERPL BCP-MCNC: 4.7 G/DL (ref 3.5–5.2)
ALP SERPL-CCNC: 64 U/L (ref 55–135)
ALT SERPL W/O P-5'-P-CCNC: 21 U/L (ref 10–44)
ANION GAP SERPL CALC-SCNC: 11 MMOL/L (ref 8–16)
AST SERPL-CCNC: 19 U/L (ref 10–40)
BASOPHILS # BLD AUTO: 0.06 K/UL (ref 0–0.2)
BASOPHILS NFR BLD: 0.7 % (ref 0–1.9)
BILIRUB SERPL-MCNC: 0.9 MG/DL (ref 0.1–1)
BUN SERPL-MCNC: 16 MG/DL (ref 6–20)
CALCIUM SERPL-MCNC: 9.4 MG/DL (ref 8.7–10.5)
CHLORIDE SERPL-SCNC: 102 MMOL/L (ref 95–110)
CO2 SERPL-SCNC: 26 MMOL/L (ref 23–29)
CREAT SERPL-MCNC: 0.8 MG/DL (ref 0.5–1.4)
CRP SERPL-MCNC: 0.12 MG/DL
DIFFERENTIAL METHOD: ABNORMAL
EOSINOPHIL # BLD AUTO: 0.2 K/UL (ref 0–0.5)
EOSINOPHIL NFR BLD: 1.8 % (ref 0–8)
ERYTHROCYTE [DISTWIDTH] IN BLOOD BY AUTOMATED COUNT: 12.8 % (ref 11.5–14.5)
EST. GFR  (AFRICAN AMERICAN): >60 ML/MIN/1.73 M^2
EST. GFR  (NON AFRICAN AMERICAN): >60 ML/MIN/1.73 M^2
GLUCOSE SERPL-MCNC: 96 MG/DL (ref 70–110)
HCT VFR BLD AUTO: 57 % (ref 40–54)
HGB BLD-MCNC: 19 G/DL (ref 14–18)
IMM GRANULOCYTES # BLD AUTO: 0.03 K/UL (ref 0–0.04)
IMM GRANULOCYTES NFR BLD AUTO: 0.3 % (ref 0–0.5)
LYMPHOCYTES # BLD AUTO: 1.3 K/UL (ref 1–4.8)
LYMPHOCYTES NFR BLD: 14.4 % (ref 18–48)
MCH RBC QN AUTO: 29.4 PG (ref 27–31)
MCHC RBC AUTO-ENTMCNC: 33.3 G/DL (ref 32–36)
MCV RBC AUTO: 88 FL (ref 82–98)
MONOCYTES # BLD AUTO: 0.6 K/UL (ref 0.3–1)
MONOCYTES NFR BLD: 7 % (ref 4–15)
NEUTROPHILS # BLD AUTO: 6.6 K/UL (ref 1.8–7.7)
NEUTROPHILS NFR BLD: 75.8 % (ref 38–73)
NRBC BLD-RTO: 0 /100 WBC
PLATELET # BLD AUTO: 234 K/UL (ref 150–450)
PMV BLD AUTO: 8.9 FL (ref 9.2–12.9)
POTASSIUM SERPL-SCNC: 3.7 MMOL/L (ref 3.5–5.1)
PROT SERPL-MCNC: 7.8 G/DL (ref 6–8.4)
RBC # BLD AUTO: 6.46 M/UL (ref 4.6–6.2)
SODIUM SERPL-SCNC: 139 MMOL/L (ref 136–145)
WBC # BLD AUTO: 8.7 K/UL (ref 3.9–12.7)

## 2022-02-01 PROCEDURE — 3008F BODY MASS INDEX DOCD: CPT | Mod: S$GLB,,, | Performed by: INTERNAL MEDICINE

## 2022-02-01 PROCEDURE — 85025 COMPLETE CBC W/AUTO DIFF WBC: CPT | Performed by: INTERNAL MEDICINE

## 2022-02-01 PROCEDURE — 3077F PR MOST RECENT SYSTOLIC BLOOD PRESSURE >= 140 MM HG: ICD-10-PCS | Mod: S$GLB,,, | Performed by: INTERNAL MEDICINE

## 2022-02-01 PROCEDURE — 99202 OFFICE O/P NEW SF 15 MIN: CPT | Mod: S$GLB,,, | Performed by: INTERNAL MEDICINE

## 2022-02-01 PROCEDURE — 3008F PR BODY MASS INDEX (BMI) DOCUMENTED: ICD-10-PCS | Mod: S$GLB,,, | Performed by: INTERNAL MEDICINE

## 2022-02-01 PROCEDURE — 80053 COMPREHEN METABOLIC PANEL: CPT | Performed by: INTERNAL MEDICINE

## 2022-02-01 PROCEDURE — 3080F PR MOST RECENT DIASTOLIC BLOOD PRESSURE >= 90 MM HG: ICD-10-PCS | Mod: S$GLB,,, | Performed by: INTERNAL MEDICINE

## 2022-02-01 PROCEDURE — 99202 PR OFFICE/OUTPT VISIT, NEW, LEVL II, 15-29 MIN: ICD-10-PCS | Mod: S$GLB,,, | Performed by: INTERNAL MEDICINE

## 2022-02-01 PROCEDURE — 1160F PR REVIEW ALL MEDS BY PRESCRIBER/CLIN PHARMACIST DOCUMENTED: ICD-10-PCS | Mod: S$GLB,,, | Performed by: INTERNAL MEDICINE

## 2022-02-01 PROCEDURE — 86140 C-REACTIVE PROTEIN: CPT | Performed by: INTERNAL MEDICINE

## 2022-02-01 PROCEDURE — 86431 RHEUMATOID FACTOR QUANT: CPT | Performed by: INTERNAL MEDICINE

## 2022-02-01 PROCEDURE — 1160F RVW MEDS BY RX/DR IN RCRD: CPT | Mod: S$GLB,,, | Performed by: INTERNAL MEDICINE

## 2022-02-01 PROCEDURE — 3080F DIAST BP >= 90 MM HG: CPT | Mod: S$GLB,,, | Performed by: INTERNAL MEDICINE

## 2022-02-01 PROCEDURE — 3077F SYST BP >= 140 MM HG: CPT | Mod: S$GLB,,, | Performed by: INTERNAL MEDICINE

## 2022-02-01 RX ORDER — SULINDAC 200 MG/1
200 TABLET ORAL 2 TIMES DAILY
Qty: 60 TABLET | Refills: 5 | Status: SHIPPED | OUTPATIENT
Start: 2022-02-01 | End: 2022-02-11

## 2022-02-01 NOTE — PROGRESS NOTES
Patient notified of blood count showing his blood is too thick. Instructed to start an aspirin daily beginning today, and to call his PCP and schedule an appt to be seen this month. Patient verbalized understanding of all.

## 2022-02-01 NOTE — PATIENT INSTRUCTIONS
"Patient Education       Osteoarthritis   The Basics   Written by the doctors and editors at [facility]   What is osteoarthritis? -- Arthritis is a general term that means inflammation of the joints. There are dozens of types of arthritis. Osteoarthritis is the most common type. It often comes with age, and it often affects the hands, knees, and hips.  The place where 2 bones meet is normally covered with a rubbery material called cartilage. This material allows the bones to slide over each other without causing pain. When osteoarthritis sets in, the cartilage begins to break down. As it wears away, the bones in the joint start to rub against each other (figure 1). This can cause pain, stiffness, and swelling (table 1).  What can I do to feel better? -- To ease your symptoms, you can:  · Rest for several minutes when your pain is at its worst - But don't rest too long. That can make your muscles weak and your pain worse.  · Lose weight (if you are overweight) - Being overweight puts extra strain on your joints. Your doctor or nurse can talk to you about healthy ways to lose weight.  · Get plenty of physical activity - Having strong muscles takes some of the strain off of your joints. It can reduce your pain in the long run, even if it hurts to do at first. There are lots of different ways to get physical activity. Your doctor or nurse can help you come up with an exercise routine that works for you. They might also suggest you work with a physical therapist (exercise expert).   · Use "assistive devices" if your doctor recommends them - These devices can help keep your joints stable or take weight off them. Examples include shoe inserts, splints, canes, and walkers.  · Use hot or cold packs - Some people find that heat or cold helps relieves pain for a short time.  · Learn about arthritis - Learning about your condition allows you to work with your doctor or nurse to find the things that you are most helpful for you. It " can also help you better understand what to expect with your symptoms and treatment.  Can herbs, vitamins, or supplements help? -- There is no strong evidence that supplements of any sort work on arthritis symptoms. That's true even for glucosamine and chondroitin, which are supplements people seem to think help with arthritis. If you want to try any supplements or herbs, check with your doctor or nurse before taking them.  Are there medicines I can take? -- Usually, doctors suggest trying things like physical activity, weight loss, and assistive devices first. If these do not help with pain, they might recommend medicine. Options include medicines that come as pills as well as creams and gels that go on the skin.  In some situations, doctors might suggest shots that go into the joint to relieve pain. But these are not usually used as a main treatment, because they only work for a few weeks.  What about surgery? -- When other treatments do not help enough, some people with osteoarthritis get surgery. For instance, some people have surgery to replace a knee or a hip. Surgeons are working on other types of surgery for arthritis, too.  Try different things until you find what works -- The symptoms of osteoarthritis can be hard to handle. But don't lose hope. You might need to try different combinations of medicines, exercises, and devices to find the approach that works for you. But most people do find ways to go back to doing many of things they like to do.  All topics are updated as new evidence becomes available and our peer review process is complete.  This topic retrieved from [employer] on: Sep 21, 2021.  Topic 24724 Version 15.0  Release: 29.4.2 - C29.263  © 2021 UpToDate, Inc. and/or its affiliates. All rights reserved.  figure 1: Knee osteoarthritis     This drawing shows a normal knee joint next to a knee joint with osteoarthritis (OA). In the OA joint, the cartilage covering the ends of the bones roughens and  "becomes thin, while the bone underneath the cartilage grows thicker. Bony growths called "osteophytes" can form. The space between the bones also becomes narrower.  Graphic 846112 Version 2.0    table 1: Effects of osteoarthritis  Age when symptoms start    Usually after 40   Commonly affected body parts    Neck and lower back   Joint at the base of the thumb   Knuckles in the middle and near the tip of the fingers   Hip   Knee   Ankle joint   Knuckle at the base of the big toe   Less commonly affected body parts    Shoulder   Wrist   Elbow   Knuckles at the base of the fingers   Symptoms    Pain   Stiffness   Crackling or clicking sounds in the joints   Extra bone growth (for example, knuckles that look swollen or knobby)   Decreased range of motion   Problems with the alignment of certain joints   Tenderness to the touch   Graphic 51290 Version 2.0  Consumer Information Use and Disclaimer   This information is not specific medical advice and does not replace information you receive from your health care provider. This is only a brief summary of general information. It does NOT include all infPatient Education       Elbow Tendinopathy (Tennis and Golf Elbow)   The Basics   Written by the doctors and editors at [facility]   What is elbow tendinopathy? -- Elbow tendinopathy is a condition that causes elbow pain and forearm weakness. The word "tendinopathy" refers to a problem with a tendon. Tendons are strong bands of tissue that connect muscles to bones. Depending on which elbow tendon is injured, the condition is also known as "tennis elbow" or "golf elbow." Doctors also used to call this condition "tendinitis."   What causes elbow tendinopathy? -- This condition can happen as people get older, especially if they do a lot of work or activity using their elbow and forearm. It can also happen when people get hurt or do the same movements over and over.  The terms "tennis elbow" and "golf elbow" refer to the swinging " "motion people do in these sports, which can cause tendinopathy. But other activities or jobs can also cause this problem if they involve similar movements.  What are the symptoms of elbow tendinopathy? -- The most common symptoms are:  · Elbow pain - This is the main symptom of elbow tendinopathy. Pain can start slowly or suddenly, and can be mild or more severe. It can spread to the upper arm or forearm. Pain is most common when the tendon is working or stretched.   · Muscle weakness - The forearm muscles might feel weak when you  or squeeze something.  · Swelling - Some people might have mild swelling in the elbow area.  Will I need tests? -- You might. Your doctor or nurse should be able to tell if you have elbow tendinopathy by talking with you and doing an exam. They might also have you do specific arm movements to better understand what motions or activities cause pain.   In some cases, the doctor might also do an imaging test, such as an ultrasound. Imaging tests create pictures of the inside of the body.  How is elbow tendinopathy treated? -- Most of the time, this condition will get better on its own, but it can take months to heal completely. To help get better, you can:  · Rest your elbow and arm - If possible, try to avoid or reduce activities that make your pain worse.  · Wear a brace or sleeve - Ask your doctor or nurse about this. Wearing a special brace or "compression sleeve" can help support your elbow and relieve pain. These work by reducing strain on the tendon when you use your arm.  · Take a pain-relieving medicine - Your doctor might recommend that you take a medicine such as acetaminophen (sample brand name: Tylenol), ibuprofen (sample brand names: Advil, Motrin), or naproxen (sample brand names: Aleve, Naprosyn).  · Put ice on your elbow - This might help after doing activities that make your pain worse. Put a cold gel pack, bag of ice, or bag of frozen vegetables on the area every 1 to " 2 hours, for 15 minutes each time. Put a thin towel between the ice (or other cold object) and your skin.   · Do stretches - Stretching the muscles in your lower arm can be helpful. The following are stretches you can do at home, depending on which tendon is injured:  ? Tennis elbow stretch - Hold your injured arm straight out, and point your fingers down to the ground. Use your other hand (with the thumb pressing on the palm) to grab this hand. Then press down on the back of the hand to bend the wrist more (picture 1). Hold this position for 30 seconds. Repeat the stretch 3 times. Do this exercise 1 time a day.  ? Golf elbow stretch - Stand an arm's length away from the wall, with the injured arm closest to the wall. Put your palm on the wall, with your fingers pointing down. Press gently against the wall to stretch your muscles (picture 2). Hold this position for 30 seconds. Repeat the stretch 3 times. Do this exercise 1 time a day.  · Get physical therapy - This involves learning specific exercises to help with your symptoms. The right exercises for you will depend on which elbow tendon is injured. Other types of exercises can help make the forearm muscles stronger. Your doctor, nurse, or physical therapist (exercise expert) can show you how to do these types of exercises. They will tell you when to start them and how often to do them.  What if my symptoms don't get better? -- If you have been doing your exercises for at least 8 to 12 weeks and your symptoms are not getting better, talk with your doctor or nurse. They can suggest other treatments that might help. They might also want to do imaging tests, like an ultrasound or X-ray, to see if something else might be causing your symptoms.  Rarely, elbow tendinopathy is treated with surgery. This might be considered if other treatments do not help after many months. But most of the time, the condition will get better without surgery.  Can elbow tendinopathy be  prevented? -- Yes. To help prevent elbow tendinopathy, you can:  · Take breaks when you do activities in which you move your elbow and wrist a lot.  · Keep your elbows slightly bent when you exercise or lift things.  · Wear gloves or use 2 hands when using tools.  · If you play tennis, use a 2-handed backhand swing.  · If you play golf, use  tape or padding on your golf clubs.  All topics are updated as new evidence becomes available and our peer review process is complete.  This topic retrieved from [employer] on: Sep 21, 2021.  Topic 51701 Version 7.0  Release: 29.4.2 - C29.263  © 2021 UpToDate, Inc. and/or its affiliates. All rights reserved.  picture 1: Forearm extensor stretch     Hold your injured arm straight out in front of you, palm down, with your fingers pointing toward the ground. Use your other hand to grab the hand on the injured side, pressing your thumb into the palm. Then press down on the back of the hand on the injured side so that your wrist bends further than it does on its own. Hold this position for 30 seconds.  Graphic 45088 Version 6.0    picture 2: Golf elbow stretch     Stand an arm's length away from the wall, with the injured arm closest to the wall. Put your palm on the wall, with your fingers pointing down. Press gently against the wall to stretch your muscles. Hold this position for 30 seconds. Repeat the stretch 3 times. Do this exercise 1 time a day.  Graphic 96275 Version 1.0    Consumer Information Use and Disclaimer   This information is not specific medical advice and does not replace information you receive from your health care provider. This is only a brief summary of general information. It does NOT include all information about conditions, illnesses, injuries, tests, procedures, treatments, therapies, discharge instructions or life-style choices that may apply to you. You must talk with your health care provider for complete information about your health and treatment  options. This information should not be used to decide whether or not to accept your health care provider's advice, instructions or recommendations. Only your health care provider has the knowledge and training to provide advice that is right for you. The use of this information is governed by the [employer] End User License Agreement, available at https://www.[employer]/en/solutions/[employer]/about/shy.The use of [employer] content is governed by the [employer] Terms of Use. ©2021 UpToDate, Inc. All rights reserved.  Copyright   © 2021 UpToDate, Inc. and/or its affiliates. All rights reserved.  ormation about conditions, illnesses, injuries, tests, procedures, treatments, therapies, discharge instructions or life-style choices that may apply to you. You must talk with your health care provider for complete information about your health and treatment options. This information should not be used to decide whether or not to accept your health care provider's advice, instructions or recommendations. Only your health care provider has the knowledge and training to provide advice that is right for you. The use of this information is governed by the [employer] End User License Agreement, available at https://www.[employer]/en/solutions/[employer]/about/shy.The use of [employer] content is governed by the [employer] Terms of Use. ©2021 UpToDate, Inc. All rights reserved.  Copyright   © 2021 UpToDate, Inc. and/or its affiliates. All rights reserved.

## 2022-02-01 NOTE — TELEPHONE ENCOUNTER
----- Message from Delonte Blevins MD sent at 2/1/2022  4:42 PM CST -----  I saw this elan today for osteoarthritis and soft tissue complaints.  His CBC is worrisome.  Please let me know that you will follow-up on this soon.    Thank you

## 2022-02-01 NOTE — PROGRESS NOTES
"Tell the patient his blood count shows that his "blood is too thick."  I would like him to start an aspirin daily beginning today.  He should call his primary care doctor and schedule an appointment to be seen this month.  Thank you"

## 2022-02-01 NOTE — PROGRESS NOTES
[facility] RHEUMATOLOG[facility]           New patient visit    Notes dictated to M*Modal. Please forgive any unintentional errors.  Subjective:       Patient ID:   NAME: David Aguilera : 1965     56 y.o. male    Referring Doc: No ref. provider found  Other Physicians:    Chief Complaint:  Joint Pain      HPI:  This patient presents for evaluation of joint pain, primarily in the elbows and occasionally in the knees.  The patient states this has been going on for more than a year but has gotten worse in the last 6 months.  He has not noted any red, hot, and/or swollen joints.  He does not have morning stiffness.  He takes ibuprofen up to 800 mg twice daily with 70% relief.  He finds that the pain worsens throughout the work day and at times makes it impossible for him sleep comfortably.  He works as a  using a [employer] saw every day.  He works for himself.    ROS:   GEN:      no fever, night sweats or weight loss  SKIN:     no rashes, bruising, no Raynauds, no photosensitivity  HEENT:  no acute changes in vision, no mouth ulcers, no sicca symptoms, no scalp tenderness, jaw claudication.  CV:        no CP, PND, HUNT or orthopnea, no palpitations  PULM:   no SOB, cough, hemoptysis, sputum or pleuritic pain  GI:          No dysphagia, GERD, hematemesis; no abdominal pain, nausea, vomiting, constipation, diarrhea, melanotic stools, BRBPR.  :        no hematuria, dysuria  NEURO: no paresthesias, headaches, no tremors  MUSCULOSKELETAL:  As above   PSYCH: + insomnia, + anxiety, no depression    Past Medical/Surgical History:  Past Medical History:   Diagnosis Date    ADD (attention deficit disorder with hyperactivity) 4/15/2013     Past Surgical History:   Procedure Laterality Date    COLONOSCOPY N/A 2018    Dr. Chadwick; unremarkable; repeat in 10 years    L knee scope      L shoulder surgery      VASECTOMY         Allergies:  Review of patient's allergies indicates:  No Known  Allergies    Social/Family History:  Social History     Socioeconomic History    Marital status:    Tobacco Use    Smoking status: Never Smoker    Smokeless tobacco: Never Used   Substance and Sexual Activity    Alcohol use: No    Drug use: No    Sexual activity: Yes     Partners: Female     Family History   Problem Relation Age of Onset    Stroke Mother 76    Colon cancer Father         ?    Stomach cancer Neg Hx     Esophageal cancer Neg Hx     Crohn's disease Neg Hx     Ulcerative colitis Neg Hx      FAMILY HISTORY: negative for Connective Tissue Disease      Medications:    Current Outpatient Medications:     dextroamphetamine-amphetamine (AMPHETAMINE SALT COMBO) 30 mg Tab, Take 1 tablet by mouth 3 (three) times daily. , Disp: , Rfl:     testosterone cypionate (DEPOTESTOTERONE CYPIONATE) 200 mg/mL injection, INJECT 1 ML (200MG) INTRAMUSCULARLY EVERY 14 DAYS, Disp: 2 mL, Rfl: 0    sulindac (CLINORIL) 200 MG Tab, Take 1 tablet (200 mg total) by mouth 2 (two) times daily., Disp: 60 tablet, Rfl: 5    Objective:     Vitals:  Blood pressure (!) 163/91, weight 119.3 kg (263 lb 1.6 oz).    Physical Examination:   GEN:     wn/wd male in no apparent distress  SKIN:    no rashes, no sclerodactyly, no Raynaud's, no periungual erythema, no digital tip tapering, no nailbed pitting  HEAD:   no alopecia, no scalp tenderness, no temporal artery tenderness or induration.  EYES:   no conjunctival pallor, no icterus  ENT:     no thrush, no mucosal dryness or ulcerations, adequate oral hygiene & dentition.  NECK:  supple x 6, no masses, no thyromegaly, no lymphadenopathy.  CV:        S1 and S2, RRR, no murmurs, gallop or rubs  CHEST: Normal respiratory effort;  normal breath sounds/no adventitious sounds. No signs of consolidation.  ABD:      non-tender and non-distended; soft; normal bowel sounds; no rebound, guarding, or tenderness. No hepatosplenomegaly.  Musculoskeletal:  Moderate tenderness overlying the  left lateral epicondyle.  Resisted wrist extension reproduces the pain precisely.  There is no evidence of inflammatory arthritis.  Mild degenerative changes are noted in the knees with crepitus present on the right.  EXTREM: no clubbing, cyanosis or edema. normal pulses. Miguel's - x2  NEURO:  grossly intact; motor/sensory WNL; no tremors  PSYCH:  normal mood, affect and behavior    Labs:   Lab Results   Component Value Date    WBC 9.53 09/06/2018    HGB 17.3 12/13/2018    HCT 51.9 12/13/2018    MCV 93 09/06/2018     09/06/2018   CMP@  Sodium   Date Value Ref Range Status   09/05/2018 139 136 - 145 mmol/L Final     Potassium   Date Value Ref Range Status   09/05/2018 4.5 3.5 - 5.1 mmol/L Final     Chloride   Date Value Ref Range Status   09/05/2018 104 95 - 110 mmol/L Final     CO2   Date Value Ref Range Status   09/05/2018 29 23 - 29 mmol/L Final     Glucose   Date Value Ref Range Status   09/05/2018 79 70 - 110 mg/dL Final     BUN   Date Value Ref Range Status   09/05/2018 18 6 - 20 mg/dL Final     Creatinine   Date Value Ref Range Status   09/05/2018 0.9 0.5 - 1.4 mg/dL Final     Calcium   Date Value Ref Range Status   09/05/2018 9.5 8.7 - 10.5 mg/dL Final     Total Protein   Date Value Ref Range Status   09/05/2018 6.9 6.0 - 8.4 g/dL Final     Albumin   Date Value Ref Range Status   09/05/2018 4.0 3.5 - 5.2 g/dL Final     Total Bilirubin   Date Value Ref Range Status   09/05/2018 0.9 0.1 - 1.0 mg/dL Final     Comment:     For infants and newborns, interpretation of results should be based  on gestational age, weight and in agreement with clinical  observations.  Premature Infant recommended reference ranges:  Up to 24 hours.............<8.0 mg/dL  Up to 48 hours............<12.0 mg/dL  3-5 days..................<15.0 mg/dL  6-29 days.................<15.0 mg/dL       Alkaline Phosphatase   Date Value Ref Range Status   09/05/2018 73 55 - 135 U/L Final     AST   Date Value Ref Range Status   09/05/2018 16  10 - 40 U/L Final     ALT   Date Value Ref Range Status   09/05/2018 32 10 - 44 U/L Final       Radiology/Diagnostic Studies:    None    Assessment/Discussion/Plan:   56 y.o. male with osteoarthritis-mild  2) left lateral epicondylitis-work related    PLAN:  I have discussed the nature of OA and of the epicondylitis.  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  I have also advised him to get a wrist immobilizer which she should wear at bedtime.  I have given him a prescription for sulindac 200 mg twice daily.  He is to discontinue over-the-counter ibuprofen.  Baseline kidney function tests were ordered.  Literature on tennis elbow was provided, as was literature on osteoarthritis.    RTC:  I will see him back in 6 months or sooner if needed              Electronically signed by Delonte Blevins MD                Answers for HPI/ROS submitted by the patient on 2/1/2022  fever: No  eye redness: No  mouth sores: No  headaches: No  shortness of breath: No  chest pain: No  trouble swallowing: No  diarrhea: No  constipation: No  unexpected weight change: No  genital sore: No  During the last 3 days, have you had a skin rash?: No  Bruises or bleeds easily: No  cough: No

## 2022-02-01 NOTE — PROGRESS NOTES
I saw this elan today for osteoarthritis and soft tissue complaints.  His CBC is worrisome.  Please let me know that you will follow-up on this soon.    Thank you

## 2022-02-03 ENCOUNTER — TELEPHONE (OUTPATIENT)
Dept: HEMATOLOGY/ONCOLOGY | Facility: CLINIC | Age: 57
End: 2022-02-03
Payer: COMMERCIAL

## 2022-02-03 LAB — RHEUMATOID FACT SERPL-ACNC: <10 IU/ML

## 2022-02-07 ENCOUNTER — DOCUMENTATION ONLY (OUTPATIENT)
Dept: HEMATOLOGY/ONCOLOGY | Facility: CLINIC | Age: 57
End: 2022-02-07
Payer: COMMERCIAL

## 2022-02-07 ENCOUNTER — TELEPHONE (OUTPATIENT)
Dept: HEMATOLOGY/ONCOLOGY | Facility: CLINIC | Age: 57
End: 2022-02-07
Payer: COMMERCIAL

## 2022-02-07 ENCOUNTER — OFFICE VISIT (OUTPATIENT)
Dept: HEMATOLOGY/ONCOLOGY | Facility: CLINIC | Age: 57
End: 2022-02-07
Payer: COMMERCIAL

## 2022-02-07 VITALS
DIASTOLIC BLOOD PRESSURE: 112 MMHG | BODY MASS INDEX: 36.07 KG/M2 | OXYGEN SATURATION: 98 % | SYSTOLIC BLOOD PRESSURE: 162 MMHG | HEART RATE: 67 BPM | TEMPERATURE: 98 F | HEIGHT: 72 IN | WEIGHT: 266.31 LBS | RESPIRATION RATE: 18 BRPM

## 2022-02-07 DIAGNOSIS — D75.1 POLYCYTHEMIA: Primary | ICD-10-CM

## 2022-02-07 DIAGNOSIS — I10 HYPERTENSION, UNSPECIFIED TYPE: ICD-10-CM

## 2022-02-07 DIAGNOSIS — E29.1 TESTICULAR HYPOFUNCTION: ICD-10-CM

## 2022-02-07 PROCEDURE — 1160F RVW MEDS BY RX/DR IN RCRD: CPT | Mod: CPTII,S$GLB,, | Performed by: STUDENT IN AN ORGANIZED HEALTH CARE EDUCATION/TRAINING PROGRAM

## 2022-02-07 PROCEDURE — 99999 PR PBB SHADOW E&M-EST. PATIENT-LVL IV: CPT | Mod: PBBFAC,,, | Performed by: STUDENT IN AN ORGANIZED HEALTH CARE EDUCATION/TRAINING PROGRAM

## 2022-02-07 PROCEDURE — 99204 OFFICE O/P NEW MOD 45 MIN: CPT | Mod: S$GLB,,, | Performed by: STUDENT IN AN ORGANIZED HEALTH CARE EDUCATION/TRAINING PROGRAM

## 2022-02-07 PROCEDURE — 1159F PR MEDICATION LIST DOCUMENTED IN MEDICAL RECORD: ICD-10-PCS | Mod: CPTII,S$GLB,, | Performed by: STUDENT IN AN ORGANIZED HEALTH CARE EDUCATION/TRAINING PROGRAM

## 2022-02-07 PROCEDURE — 3008F BODY MASS INDEX DOCD: CPT | Mod: CPTII,S$GLB,, | Performed by: STUDENT IN AN ORGANIZED HEALTH CARE EDUCATION/TRAINING PROGRAM

## 2022-02-07 PROCEDURE — 1159F MED LIST DOCD IN RCRD: CPT | Mod: CPTII,S$GLB,, | Performed by: STUDENT IN AN ORGANIZED HEALTH CARE EDUCATION/TRAINING PROGRAM

## 2022-02-07 PROCEDURE — 3080F DIAST BP >= 90 MM HG: CPT | Mod: CPTII,S$GLB,, | Performed by: STUDENT IN AN ORGANIZED HEALTH CARE EDUCATION/TRAINING PROGRAM

## 2022-02-07 PROCEDURE — 3077F SYST BP >= 140 MM HG: CPT | Mod: CPTII,S$GLB,, | Performed by: STUDENT IN AN ORGANIZED HEALTH CARE EDUCATION/TRAINING PROGRAM

## 2022-02-07 PROCEDURE — 99999 PR PBB SHADOW E&M-EST. PATIENT-LVL IV: ICD-10-PCS | Mod: PBBFAC,,, | Performed by: STUDENT IN AN ORGANIZED HEALTH CARE EDUCATION/TRAINING PROGRAM

## 2022-02-07 PROCEDURE — 3077F PR MOST RECENT SYSTOLIC BLOOD PRESSURE >= 140 MM HG: ICD-10-PCS | Mod: CPTII,S$GLB,, | Performed by: STUDENT IN AN ORGANIZED HEALTH CARE EDUCATION/TRAINING PROGRAM

## 2022-02-07 PROCEDURE — 3008F PR BODY MASS INDEX (BMI) DOCUMENTED: ICD-10-PCS | Mod: CPTII,S$GLB,, | Performed by: STUDENT IN AN ORGANIZED HEALTH CARE EDUCATION/TRAINING PROGRAM

## 2022-02-07 PROCEDURE — 1160F PR REVIEW ALL MEDS BY PRESCRIBER/CLIN PHARMACIST DOCUMENTED: ICD-10-PCS | Mod: CPTII,S$GLB,, | Performed by: STUDENT IN AN ORGANIZED HEALTH CARE EDUCATION/TRAINING PROGRAM

## 2022-02-07 PROCEDURE — 3080F PR MOST RECENT DIASTOLIC BLOOD PRESSURE >= 90 MM HG: ICD-10-PCS | Mod: CPTII,S$GLB,, | Performed by: STUDENT IN AN ORGANIZED HEALTH CARE EDUCATION/TRAINING PROGRAM

## 2022-02-07 PROCEDURE — 99204 PR OFFICE/OUTPT VISIT, NEW, LEVL IV, 45-59 MIN: ICD-10-PCS | Mod: S$GLB,,, | Performed by: STUDENT IN AN ORGANIZED HEALTH CARE EDUCATION/TRAINING PROGRAM

## 2022-02-07 RX ORDER — ALBUTEROL SULFATE 90 UG/1
AEROSOL, METERED RESPIRATORY (INHALATION)
COMMUNITY
Start: 2021-08-19 | End: 2022-02-11

## 2022-02-07 RX ORDER — MONTELUKAST SODIUM 10 MG/1
10 TABLET ORAL NIGHTLY
COMMUNITY
Start: 2021-08-19 | End: 2022-02-11

## 2022-02-07 RX ORDER — FAMOTIDINE 20 MG/1
20 TABLET, FILM COATED ORAL 3 TIMES DAILY
COMMUNITY
Start: 2021-08-19 | End: 2022-03-07

## 2022-02-07 RX ORDER — AZITHROMYCIN 250 MG/1
TABLET, FILM COATED ORAL
COMMUNITY
Start: 2021-08-19 | End: 2022-02-11

## 2022-02-07 RX ORDER — PROMETHAZINE HYDROCHLORIDE AND DEXTROMETHORPHAN HYDROBROMIDE 6.25; 15 MG/5ML; MG/5ML
5 SYRUP ORAL EVERY 6 HOURS PRN
COMMUNITY
Start: 2021-08-19 | End: 2022-02-11

## 2022-02-07 RX ORDER — ZOLPIDEM TARTRATE 10 MG/1
10 TABLET ORAL NIGHTLY
COMMUNITY
Start: 2022-02-04 | End: 2022-03-07

## 2022-02-07 NOTE — TELEPHONE ENCOUNTER
Phlebotomy orders faxed to [facility] outpatient infusion at [phone].

## 2022-02-07 NOTE — PROGRESS NOTES
Subjective:                                                                                    Consult note   Patient ID:    Name: David Aguilera  : 1965  MRN: [id number]    HPI:   David Aguilera is a 56 y.o. male presents for evaluation of Polycythemia    Patent was referred to us for polycythemia (Hb 19/Hct: 57.0). normal wbc and plts. On further discussion patient it seems to be taking testesterone injections every 2 weeks for the past 3 years and he was told to donate blood every 3 months . Patient does have headaches/ blurry vision and having increase BP.     Reviewed his records from  office patient with hypogonadism on testosterone cypionate 200 mg every other week as well as hematospermia. Last H/H  in 2021 was 17.4/ 51.8 % with normal H/H in  ( 15/47.8%). Patient denies any history of strokes or history of DVT/clots. Denies smoking history or family history of cancer.     Past Medical History:   Diagnosis Date    ADD (attention deficit disorder with hyperactivity) 4/15/2013       Past Surgical History:   Procedure Laterality Date    COLONOSCOPY N/A 2018    Dr. Chadwick; unremarkable; repeat in 10 years    L knee scope      L shoulder surgery      VASECTOMY         Family History   Problem Relation Age of Onset    Stroke Mother 76    Colon cancer Father         ?    Stomach cancer Neg Hx     Esophageal cancer Neg Hx     Crohn's disease Neg Hx     Ulcerative colitis Neg Hx        Social History     Socioeconomic History    Marital status:    Tobacco Use    Smoking status: Never Smoker    Smokeless tobacco: Never Used   Substance and Sexual Activity    Alcohol use: No    Drug use: No    Sexual activity: Yes     Partners: Female       Review of patient's allergies indicates:  No Known Allergies    Review of Systems   Constitutional: Negative for decreased appetite, fever, malaise/fatigue and night sweats.   Eyes: Negative for blurred vision and visual  disturbance.   Cardiovascular: Negative for chest pain, dyspnea on exertion and orthopnea.   Respiratory: Negative for cough and shortness of breath.    Hematologic/Lymphatic: Negative for adenopathy.   Skin: Negative for rash.   Musculoskeletal: Negative for back pain.   Gastrointestinal: Negative for abdominal pain, diarrhea, hemorrhoids, melena, nausea and vomiting.   Genitourinary: Negative for dysuria and frequency.   Neurological: Negative for headaches, loss of balance and weakness.   Psychiatric/Behavioral: The patient is not nervous/anxious.           Objective:     Vitals:    02/07/22 0908   BP: (!) 162/112   BP Location: Right arm   Patient Position: Sitting   BP Method: Medium (Manual)   Pulse: 67   Resp: 18   Temp: 98.3 °F (36.8 °C)   TempSrc: Oral   SpO2: 98%   Weight: 120.8 kg (266 lb 5.1 oz)   Height: 6' (1.829 m)        Physical Exam  Constitutional:       Appearance: Normal appearance.   HENT:      Head: Normocephalic and atraumatic.   Eyes:      General: No scleral icterus.  Neck:      Thyroid: No thyroid mass or thyroid tenderness.   Cardiovascular:      Rate and Rhythm: Normal rate and regular rhythm.      Heart sounds: Normal heart sounds.   Pulmonary:      Effort: Pulmonary effort is normal.      Breath sounds: Normal breath sounds.   Abdominal:      General: Bowel sounds are normal. There is no distension.      Palpations: Abdomen is soft. There is no mass.   Musculoskeletal:         General: No swelling or tenderness.      Cervical back: Normal range of motion and neck supple. No tenderness.   Lymphadenopathy:      Cervical: No cervical adenopathy.   Skin:     General: Skin is warm.      Findings: No bruising, erythema or rash.   Neurological:      General: No focal deficit present.      Mental Status: He is alert and oriented to person, place, and time.   Psychiatric:         Mood and Affect: Mood normal.         Behavior: Behavior normal.             Current Outpatient Medications on File  Prior to Visit   Medication Sig    albuterol (PROVENTIL/VENTOLIN HFA) 90 mcg/actuation inhaler INHALE 1 TO 2 PUFFS BY MOUTH EVERY 4 HOURS AS NEEDED FOR WHEEZING    dextroamphetamine-amphetamine (AMPHETAMINE SALT COMBO) 30 mg Tab Take 1 tablet by mouth 3 (three) times daily.     famotidine (PEPCID) 20 MG tablet Take 20 mg by mouth 3 (three) times daily.    testosterone cypionate (DEPOTESTOTERONE CYPIONATE) 200 mg/mL injection INJECT 1 ML (200MG) INTRAMUSCULARLY EVERY 14 DAYS    zolpidem (AMBIEN) 10 mg Tab Take 10 mg by mouth nightly.    azithromycin (Z-CELESTINO) 250 MG tablet TAKE 2 TABLETS BY MOUTH FOR 1 DAY THEN TAKE 1 TABLET BY MOUTH FOR 4 DAYS    montelukast (SINGULAIR) 10 mg tablet Take 10 mg by mouth nightly.    promethazine-dextromethorphan (PROMETHAZINE-DM) 6.25-15 mg/5 mL Syrp Take 5 mLs by mouth every 6 (six) hours as needed. AS NEEDED FOR COUGH    sulindac (CLINORIL) 200 MG Tab Take 1 tablet (200 mg total) by mouth 2 (two) times daily. (Patient not taking: Reported on 2/7/2022)     No current facility-administered medications on file prior to visit.       CBC:  Lab Results   Component Value Date    WBC 6.13 02/08/2022    HGB 19.0 (H) 02/08/2022    HCT 56.6 (H) 02/08/2022    MCV 89 02/08/2022     02/08/2022         CMP:  Sodium   Date Value Ref Range Status   02/01/2022 139 136 - 145 mmol/L Final     Potassium   Date Value Ref Range Status   02/01/2022 3.7 3.5 - 5.1 mmol/L Final     Chloride   Date Value Ref Range Status   02/01/2022 102 95 - 110 mmol/L Final     CO2   Date Value Ref Range Status   02/01/2022 26 23 - 29 mmol/L Final     Glucose   Date Value Ref Range Status   02/01/2022 96 70 - 110 mg/dL Final     BUN   Date Value Ref Range Status   02/01/2022 16 6 - 20 mg/dL Final     Creatinine   Date Value Ref Range Status   02/01/2022 0.8 0.5 - 1.4 mg/dL Final     Calcium   Date Value Ref Range Status   02/01/2022 9.4 8.7 - 10.5 mg/dL Final     Total Protein   Date Value Ref Range Status    02/01/2022 7.8 6.0 - 8.4 g/dL Final     Albumin   Date Value Ref Range Status   02/01/2022 4.7 3.5 - 5.2 g/dL Final     Total Bilirubin   Date Value Ref Range Status   02/01/2022 0.9 0.1 - 1.0 mg/dL Final     Comment:     For infants and newborns, interpretation of results should be based  on gestational age, weight and in agreement with clinical  observations.    Premature Infant recommended reference ranges:  Up to 24 hours.............<8.0 mg/dL  Up to 48 hours............<12.0 mg/dL  3-5 days..................<15.0 mg/dL  6-29 days.................<15.0 mg/dL       Alkaline Phosphatase   Date Value Ref Range Status   02/01/2022 64 55 - 135 U/L Final     AST   Date Value Ref Range Status   02/01/2022 19 10 - 40 U/L Final     ALT   Date Value Ref Range Status   02/01/2022 21 10 - 44 U/L Final     Anion Gap   Date Value Ref Range Status   02/01/2022 11 8 - 16 mmol/L Final     eGFR if    Date Value Ref Range Status   02/01/2022 >60.0 >60 mL/min/1.73 m^2 Final     eGFR if non    Date Value Ref Range Status   02/01/2022 >60.0 >60 mL/min/1.73 m^2 Final     Comment:     Calculation used to obtain the estimated glomerular filtration  rate (eGFR) is the CKD-EPI equation.          CT Abdomen Pelvis With Contrast  Exam: CT OF THE ABDOMEN/PELVIS WITH IV CONTRAST    Clinical data: Epigastric pain. Has had hernia repair.    Technique: Direct contiguous axial CT images were acquired through the abdomen and pelvis with intravenous contrast using soft tissue and bone algorithms. Oral contrast was not administered. Reformatted/MPR images were performed. Contrast: Omnipaque 350. Amount: 100 mL. Radiation dose:  CTDIvol = 20.70 mGy, DLP = 1165 mGy x cm.    Limitations: Lack of oral contrast limits evaluation of the bowel loops.    Prior Studies: No prior studies submitted.    Findings: Lung bases:  Clear    Liver:   Unremarkable size and contour. Normal density. No evidence of mass. No evidence of  dilated ducts.    Gallbladder:  Unremarkable    Spleen:  Grossly unremarkable.    Pancreas/adrenal glands:   Grossly unremarkable size, contour and density.    Kidneys:   In anatomic position. Grossly unremarkable renal size, contour and density.  Small 4-5 mm size calculus in lower pole of right kidney. No evidence of a renal mass or cyst.  Perinephric space is unremarkable.    Retroperitoneum: No enlarged retroperitoneal lymphadenopathy. The aorta and IVC appear unremarkable.    Peritoneal cavity:  No evidence of free air or ascites.    Gastrointestinal tract: No obstruction.  Diffusely edematous small bowel loops involving the terminal ileum in right lower abdomen with increased pericolonic vascularity and surrounding fat stranding .    Appendix:  Unremarkable.    Pelvis:  Solid and hollow viscera grossly unremarkable.    Osseous structures:  No acute or destructive bony process identified.    Postoperative changes noted along lower anterior abdominal wall.    IMPRESSION:    1. Diffusely edematous small bowel loops involving the terminal ileum in right lower abdomen with increased pericolonic vascularity and surrounding fat stranding -likely enteritis of infective versus inflammatory etiology.    2. Postoperative changes noted along lower anterior abdominal wall- consistent with history of hernia repair .    3. Small 4-5 mm size calculus in lower pole of right kidney.    4.  No other acute abdominopelvic process.    Recommendation: Follow up as clinically indicated.    All CT scans at this facility utilize dose modulation, iterative reconstruction, and/or weight based dosing when appropriate to reduce radiation dose to as low as reasonably achievable.    Read by:        Suzy York MD  Transcribed by: Manisha Perez  Transcribed Date: 8/2/2019 10:30:08 PM  Electronically signed by: Suzy York MD  Date signed: 8/2/2019 11:46:57 PM     All outside records, pertinent labs and imaging  reviewed.    Assessment:       1. Polycythemia    2. Testicular hypofunction    3. Hypertension, unspecified type      # Polycythemia: secondary likely due to testosterone supplements   - denies any dehydration, hx of smoking,sleep apnea, COPD  - on testosterone supplements for the past 3 months   - checking Erythropoetin   - cont with 81mg ASA daily and donating blood every 3 months   - Discussion with patient about possible spacing out in his testosterone supplements frequency from every 2 weeks to every 4 weeks due to his elevated RBC and his chance of possible stroke or developing a clots, patient understood   - will do phlebotomy Hct<48% since its 2nd polycythemia and repeat it as needed   - Might consider checking JAK2 mutation later, less likely will be positive given the normal H/H in 2018 (prior to testosterone supplements)     # Testicular hypofunction:  - following up with Dr Sha Castro   - on injection every 2 weeks   - blood donation every 3 months   - discussed about spacing it out and checking his PSA with his urology     # Hypertension   - Possible due to above   - Needs to be seen by PCP and start on BP medication   - off note patient is on Tadalafil as well   - Moved his PCP kenneth to earlier time     Plan:     Polycythemia  -     Ambulatory referral/consult to Hematology / Oncology  -     CBC w/ DIFF; Future; Expected date: 02/07/2022  -     Erythropoietin; Future; Expected date: 02/07/2022    Testicular hypofunction    Hypertension, unspecified type       Patient queried and all questions were answered.    F/u in 4 weeks with CBC/Erythropoeitin prior     Signed:  Radha Mckenna MD  Hematology and Oncology  [facility]    Answers for HPI/ROS submitted by the patient on 2/6/2022  appetite change : No  unexpected weight change: No  mouth sores: No

## 2022-02-08 PROBLEM — I10 HYPERTENSION: Status: ACTIVE | Noted: 2022-02-08

## 2022-02-08 PROBLEM — E29.1 TESTICULAR HYPOFUNCTION: Status: ACTIVE | Noted: 2022-02-08

## 2022-02-08 PROBLEM — D75.1 POLYCYTHEMIA: Status: ACTIVE | Noted: 2022-02-08

## 2022-02-11 ENCOUNTER — OFFICE VISIT (OUTPATIENT)
Dept: FAMILY MEDICINE | Facility: CLINIC | Age: 57
End: 2022-02-11
Payer: COMMERCIAL

## 2022-02-11 ENCOUNTER — LAB VISIT (OUTPATIENT)
Dept: LAB | Facility: HOSPITAL | Age: 57
End: 2022-02-11
Attending: FAMILY MEDICINE
Payer: COMMERCIAL

## 2022-02-11 VITALS
OXYGEN SATURATION: 97 % | HEART RATE: 80 BPM | WEIGHT: 262.38 LBS | DIASTOLIC BLOOD PRESSURE: 86 MMHG | RESPIRATION RATE: 18 BRPM | SYSTOLIC BLOOD PRESSURE: 138 MMHG | BODY MASS INDEX: 35.54 KG/M2 | HEIGHT: 72 IN

## 2022-02-11 DIAGNOSIS — I10 HYPERTENSION, UNSPECIFIED TYPE: ICD-10-CM

## 2022-02-11 DIAGNOSIS — E29.1 HYPOGONADISM IN MALE: ICD-10-CM

## 2022-02-11 DIAGNOSIS — G47.00 INSOMNIA, UNSPECIFIED TYPE: ICD-10-CM

## 2022-02-11 DIAGNOSIS — Z00.00 WELLNESS EXAMINATION: ICD-10-CM

## 2022-02-11 DIAGNOSIS — E78.00 PURE HYPERCHOLESTEROLEMIA: ICD-10-CM

## 2022-02-11 DIAGNOSIS — Z12.11 COLON CANCER SCREENING: ICD-10-CM

## 2022-02-11 DIAGNOSIS — Z00.00 WELLNESS EXAMINATION: Primary | ICD-10-CM

## 2022-02-11 DIAGNOSIS — F90.9 ATTENTION DEFICIT DISORDER WITH HYPERACTIVITY: ICD-10-CM

## 2022-02-11 DIAGNOSIS — D75.1 SECONDARY POLYCYTHEMIA: ICD-10-CM

## 2022-02-11 DIAGNOSIS — Z23 IMMUNIZATION DUE: ICD-10-CM

## 2022-02-11 PROCEDURE — 86803 HEPATITIS C AB TEST: CPT | Performed by: FAMILY MEDICINE

## 2022-02-11 PROCEDURE — 99999 PR PBB SHADOW E&M-EST. PATIENT-LVL III: CPT | Mod: PBBFAC,,, | Performed by: FAMILY MEDICINE

## 2022-02-11 PROCEDURE — 80061 LIPID PANEL: CPT | Performed by: FAMILY MEDICINE

## 2022-02-11 PROCEDURE — 1160F RVW MEDS BY RX/DR IN RCRD: CPT | Mod: CPTII,S$GLB,, | Performed by: FAMILY MEDICINE

## 2022-02-11 PROCEDURE — 87389 HIV-1 AG W/HIV-1&-2 AB AG IA: CPT | Performed by: FAMILY MEDICINE

## 2022-02-11 PROCEDURE — 84443 ASSAY THYROID STIM HORMONE: CPT | Performed by: FAMILY MEDICINE

## 2022-02-11 PROCEDURE — 3075F SYST BP GE 130 - 139MM HG: CPT | Mod: CPTII,S$GLB,, | Performed by: FAMILY MEDICINE

## 2022-02-11 PROCEDURE — 3079F DIAST BP 80-89 MM HG: CPT | Mod: CPTII,S$GLB,, | Performed by: FAMILY MEDICINE

## 2022-02-11 PROCEDURE — 3079F PR MOST RECENT DIASTOLIC BLOOD PRESSURE 80-89 MM HG: ICD-10-PCS | Mod: CPTII,S$GLB,, | Performed by: FAMILY MEDICINE

## 2022-02-11 PROCEDURE — 3008F PR BODY MASS INDEX (BMI) DOCUMENTED: ICD-10-PCS | Mod: CPTII,S$GLB,, | Performed by: FAMILY MEDICINE

## 2022-02-11 PROCEDURE — 36415 COLL VENOUS BLD VENIPUNCTURE: CPT | Mod: PN | Performed by: FAMILY MEDICINE

## 2022-02-11 PROCEDURE — 83036 HEMOGLOBIN GLYCOSYLATED A1C: CPT | Performed by: FAMILY MEDICINE

## 2022-02-11 PROCEDURE — 99386 PR PREVENTIVE VISIT,NEW,40-64: ICD-10-PCS | Mod: S$GLB,,, | Performed by: FAMILY MEDICINE

## 2022-02-11 PROCEDURE — 3075F PR MOST RECENT SYSTOLIC BLOOD PRESS GE 130-139MM HG: ICD-10-PCS | Mod: CPTII,S$GLB,, | Performed by: FAMILY MEDICINE

## 2022-02-11 PROCEDURE — 99999 PR PBB SHADOW E&M-EST. PATIENT-LVL III: ICD-10-PCS | Mod: PBBFAC,,, | Performed by: FAMILY MEDICINE

## 2022-02-11 PROCEDURE — 1160F PR REVIEW ALL MEDS BY PRESCRIBER/CLIN PHARMACIST DOCUMENTED: ICD-10-PCS | Mod: CPTII,S$GLB,, | Performed by: FAMILY MEDICINE

## 2022-02-11 PROCEDURE — 1159F MED LIST DOCD IN RCRD: CPT | Mod: CPTII,S$GLB,, | Performed by: FAMILY MEDICINE

## 2022-02-11 PROCEDURE — 3008F BODY MASS INDEX DOCD: CPT | Mod: CPTII,S$GLB,, | Performed by: FAMILY MEDICINE

## 2022-02-11 PROCEDURE — 99386 PREV VISIT NEW AGE 40-64: CPT | Mod: S$GLB,,, | Performed by: FAMILY MEDICINE

## 2022-02-11 PROCEDURE — 1159F PR MEDICATION LIST DOCUMENTED IN MEDICAL RECORD: ICD-10-PCS | Mod: CPTII,S$GLB,, | Performed by: FAMILY MEDICINE

## 2022-02-11 NOTE — PROGRESS NOTES
THIS DOCUMENT WAS MADE IN PART WITH VOICE RECOGNITION SOFTWARE.  OCCASIONALLY THIS SOFTWARE WILL MISINTERPRET WORDS OR PHRASES.    Assessment and Plan:    Patient will monitor blood pressure, consider starting antihypertensive if consistently above 140/90  Follow-up with Hematology for secondary polycythemia  Follow-up with behavioral health for ADD, insomnia treatment  Follow-up with Urology for management of hypogonadism  Check cholesterol, treat pending  Up-to-date on colon cancer screening  Gave counseling on vaccine schedule      1. Wellness examination  - Lipid Panel; Future  - Hemoglobin A1C; Future  - TSH; Future  - Urinalysis, Reflex to Urine Culture Urine, Clean Catch; Future  - HIV 1/2 Ag/Ab (4th Gen); Future  - Hepatitis C Antibody; Future    2. Immunization du    3. Colon cancer screening    4. Pure hypercholesterolemia    5. Hypertension, unspecified type    6. Insomnia    7. Hypogonadism in male    8. ADD (attention deficit disorder with hyperactivity)    9. Secondary polycythemia          ______________________________________________________________________  Subjective:    Chief Complaint:  Chief Complaint   Patient presents with    Establish [facility]        HPI:  David is a 56 y.o. year old     56-year-old male presents to establish care    ADD , Insomnia  Prescriber- Wagner Arguello (? Behavioral specialist)   Rx-Adderall 30 mg 3 times daily, Ambien 10 mg  Reports taking Ambien sparingly     Hypogonadism  rx : depot T :  200 mg every 14 days  Prescriber-Matthew   Has a secondary polycythemia: having therapeutic phlebotomy   Has hematology following   Has been hold T to get RBC lower   PSA levels monitored     Dyslipidemia  No current medications  No recent lipid panels     Hypertension  High readings 2 days ago (pt reports being in pain during readings after foot injury)   Was taken prior to phlebotomy     GERD  Rx-Pepcid taken rarely     Asthma  Rx-albuterol as needed, Singulair    Family history Colon  "Cancer : Father   UTD on colon cancer screening           Past Medical History:  Past Medical History:   Diagnosis Date    ADD (attention deficit disorder with hyperactivity) 4/15/2013    GERD (gastroesophageal reflux disease)     Hyperlipidemia     Hypertension     Hypogonadism in male     Insomnia     Secondary polycythemia        Past Surgical History:  Past Surgical History:   Procedure Laterality Date    COLONOSCOPY N/A 11/6/2018    Dr. Chadwick; unremarkable; repeat in 10 years    HERNIA REPAIR  2000    L knee scope      L shoulder surgery      VASECTOMY         Family History:  Family History   Problem Relation Age of Onset    Stroke Mother 76    Colon cancer Father         ?   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       famotidine (PEPCID) 20 MG tablet Take 20 mg by mouth 3 (three) times daily.      testosterone cypionate (DEPOTESTOTERONE CYPIONATE) 200 mg/mL injection INJECT 1 ML (200MG) INTRAMUSCULARLY EVERY 14 DAYS (Patient not taking: Reported on 2/11/2022) 2 mL 0    zolpidem (AMBIEN) 10 mg Tab Take 10 mg by mouth nightly.      [DISCONTINUED] albuterol (PROVENTIL/VENTOLIN HFA) 90 mcg/actuation inhaler INHALE 1 TO 2 PUFFS BY MOUTH EVERY 4 " HOURS AS NEEDED FOR WHEEZING      [DISCONTINUED] azithromycin (Z-CELESTINO) 250 MG tablet TAKE 2 TABLETS BY MOUTH FOR 1 DAY THEN TAKE 1 TABLET BY MOUTH FOR 4 DAYS      [DISCONTINUED] montelukast (SINGULAIR) 10 mg tablet Take 10 mg by mouth nightly.      [DISCONTINUED] promethazine-dextromethorphan (PROMETHAZINE-DM) 6.25-15 mg/5 mL Syrp Take 5 mLs by mouth every 6 (six) hours as needed. AS NEEDED FOR COUGH      [DISCONTINUED] sulindac (CLINORIL) 200 MG Tab Take 1 tablet (200 mg total) by mouth 2 (two) times daily. (Patient not taking: Reported on 2/7/2022) 60 tablet 5     No current facility-administered medications on file prior to visit.       Allergies:  Patient has no known allergies.    Immunizations:  Immunization History   Administered Date(s) Administered    Influenza - Quadrivalent - PF *Preferred* (6 months and older) 09/05/2018    Tdap 09/05/2018       Review of Systems:  Review of Systems   All other systems reviewed and are negative.      Objective:    Vitals:  Vitals:    02/11/22 0752   BP: 138/86   Pulse: 80   Resp: 18   SpO2: 97%   Weight: 119 kg (262 lb 5.6 oz)   Height: 6' (1.829 m)   PainSc: 0-No pain       Physical Exam  Vitals reviewed.   Constitutional:       General: He is not in acute distress.  HENT:      Head: Normocephalic and atraumatic.   Eyes:      Extraocular Movements: EOM normal.      Pupils: Pupils are equal, round, and reactive to light.   Cardiovascular:      Rate and Rhythm: Normal rate and regular rhythm.      Heart sounds: No murmur heard.  No friction rub.   Pulmonary:      Effort: Pulmonary effort is normal.      Breath sounds: Normal breath sounds.   Abdominal:      General: Bowel sounds are normal. There is no distension.      Palpations: Abdomen is soft.      Tenderness: There is no abdominal tenderness.   Musculoskeletal:      Cervical back: Neck supple.   Skin:     General: Skin is warm and dry.      Findings: No rash.   Psychiatric:         Mood and Affect: Mood and  affect normal.         Behavior: Behavior normal.             Vitaly Woody MD  Family Medicine

## 2022-02-12 LAB
CHOLEST SERPL-MCNC: 181 MG/DL (ref 120–199)
CHOLEST/HDLC SERPL: 3.2 {RATIO} (ref 2–5)
ESTIMATED AVG GLUCOSE: 91 MG/DL (ref 68–131)
HBA1C MFR BLD: 4.8 % (ref 4–5.6)
HDLC SERPL-MCNC: 57 MG/DL (ref 40–75)
HDLC SERPL: 31.5 % (ref 20–50)
LDLC SERPL CALC-MCNC: 108 MG/DL (ref 63–159)
NONHDLC SERPL-MCNC: 124 MG/DL
TRIGL SERPL-MCNC: 80 MG/DL (ref 30–150)
TSH SERPL DL<=0.005 MIU/L-ACNC: 2.76 UIU/ML (ref 0.4–4)

## 2022-02-14 LAB
HCV AB SERPL QL IA: NEGATIVE
HIV 1+2 AB+HIV1 P24 AG SERPL QL IA: NEGATIVE

## 2022-03-07 ENCOUNTER — PATIENT MESSAGE (OUTPATIENT)
Dept: HEMATOLOGY/ONCOLOGY | Facility: CLINIC | Age: 57
End: 2022-03-07

## 2022-03-07 ENCOUNTER — OFFICE VISIT (OUTPATIENT)
Dept: HEMATOLOGY/ONCOLOGY | Facility: CLINIC | Age: 57
End: 2022-03-07
Payer: COMMERCIAL

## 2022-03-07 ENCOUNTER — LAB VISIT (OUTPATIENT)
Dept: LAB | Facility: HOSPITAL | Age: 57
End: 2022-03-07
Attending: STUDENT IN AN ORGANIZED HEALTH CARE EDUCATION/TRAINING PROGRAM
Payer: COMMERCIAL

## 2022-03-07 ENCOUNTER — PATIENT MESSAGE (OUTPATIENT)
Dept: FAMILY MEDICINE | Facility: CLINIC | Age: 57
End: 2022-03-07
Payer: COMMERCIAL

## 2022-03-07 ENCOUNTER — TELEPHONE (OUTPATIENT)
Dept: FAMILY MEDICINE | Facility: CLINIC | Age: 57
End: 2022-03-07
Payer: COMMERCIAL

## 2022-03-07 VITALS
SYSTOLIC BLOOD PRESSURE: 180 MMHG | BODY MASS INDEX: 35.33 KG/M2 | OXYGEN SATURATION: 98 % | WEIGHT: 260.81 LBS | DIASTOLIC BLOOD PRESSURE: 118 MMHG | HEART RATE: 69 BPM | RESPIRATION RATE: 18 BRPM | TEMPERATURE: 98 F | HEIGHT: 72 IN

## 2022-03-07 DIAGNOSIS — D75.1 POLYCYTHEMIA: Primary | ICD-10-CM

## 2022-03-07 DIAGNOSIS — E29.1 TESTICULAR HYPOFUNCTION: ICD-10-CM

## 2022-03-07 DIAGNOSIS — D75.1 POLYCYTHEMIA: ICD-10-CM

## 2022-03-07 DIAGNOSIS — I10 HYPERTENSION, UNSPECIFIED TYPE: ICD-10-CM

## 2022-03-07 DIAGNOSIS — I10 HYPERTENSION, UNSPECIFIED TYPE: Primary | ICD-10-CM

## 2022-03-07 LAB
BASOPHILS # BLD AUTO: 0.03 K/UL (ref 0–0.2)
BASOPHILS NFR BLD: 0.6 % (ref 0–1.9)
DIFFERENTIAL METHOD: ABNORMAL
EOSINOPHIL # BLD AUTO: 0.2 K/UL (ref 0–0.5)
EOSINOPHIL NFR BLD: 3.3 % (ref 0–8)
ERYTHROCYTE [DISTWIDTH] IN BLOOD BY AUTOMATED COUNT: 12.2 % (ref 11.5–14.5)
HCT VFR BLD AUTO: 48 % (ref 40–54)
HGB BLD-MCNC: 16.3 G/DL (ref 14–18)
IMM GRANULOCYTES # BLD AUTO: 0.01 K/UL (ref 0–0.04)
IMM GRANULOCYTES NFR BLD AUTO: 0.2 % (ref 0–0.5)
LYMPHOCYTES # BLD AUTO: 1.2 K/UL (ref 1–4.8)
LYMPHOCYTES NFR BLD: 23.6 % (ref 18–48)
MCH RBC QN AUTO: 29.8 PG (ref 27–31)
MCHC RBC AUTO-ENTMCNC: 34 G/DL (ref 32–36)
MCV RBC AUTO: 88 FL (ref 82–98)
MONOCYTES # BLD AUTO: 0.5 K/UL (ref 0.3–1)
MONOCYTES NFR BLD: 10.1 % (ref 4–15)
NEUTROPHILS # BLD AUTO: 3.2 K/UL (ref 1.8–7.7)
NEUTROPHILS NFR BLD: 62.2 % (ref 38–73)
NRBC BLD-RTO: 0 /100 WBC
PLATELET # BLD AUTO: 216 K/UL (ref 150–450)
PMV BLD AUTO: 9 FL (ref 9.2–12.9)
RBC # BLD AUTO: 5.47 M/UL (ref 4.6–6.2)
WBC # BLD AUTO: 5.13 K/UL (ref 3.9–12.7)

## 2022-03-07 PROCEDURE — 85025 COMPLETE CBC W/AUTO DIFF WBC: CPT | Mod: PN | Performed by: STUDENT IN AN ORGANIZED HEALTH CARE EDUCATION/TRAINING PROGRAM

## 2022-03-07 PROCEDURE — 3008F BODY MASS INDEX DOCD: CPT | Mod: CPTII,S$GLB,, | Performed by: STUDENT IN AN ORGANIZED HEALTH CARE EDUCATION/TRAINING PROGRAM

## 2022-03-07 PROCEDURE — 99999 PR PBB SHADOW E&M-EST. PATIENT-LVL IV: ICD-10-PCS | Mod: PBBFAC,,, | Performed by: STUDENT IN AN ORGANIZED HEALTH CARE EDUCATION/TRAINING PROGRAM

## 2022-03-07 PROCEDURE — 1159F MED LIST DOCD IN RCRD: CPT | Mod: CPTII,S$GLB,, | Performed by: STUDENT IN AN ORGANIZED HEALTH CARE EDUCATION/TRAINING PROGRAM

## 2022-03-07 PROCEDURE — 3044F PR MOST RECENT HEMOGLOBIN A1C LEVEL <7.0%: ICD-10-PCS | Mod: CPTII,S$GLB,, | Performed by: STUDENT IN AN ORGANIZED HEALTH CARE EDUCATION/TRAINING PROGRAM

## 2022-03-07 PROCEDURE — 1159F PR MEDICATION LIST DOCUMENTED IN MEDICAL RECORD: ICD-10-PCS | Mod: CPTII,S$GLB,, | Performed by: STUDENT IN AN ORGANIZED HEALTH CARE EDUCATION/TRAINING PROGRAM

## 2022-03-07 PROCEDURE — 82668 ASSAY OF ERYTHROPOIETIN: CPT | Performed by: STUDENT IN AN ORGANIZED HEALTH CARE EDUCATION/TRAINING PROGRAM

## 2022-03-07 PROCEDURE — 3080F PR MOST RECENT DIASTOLIC BLOOD PRESSURE >= 90 MM HG: ICD-10-PCS | Mod: CPTII,S$GLB,, | Performed by: STUDENT IN AN ORGANIZED HEALTH CARE EDUCATION/TRAINING PROGRAM

## 2022-03-07 PROCEDURE — 36415 COLL VENOUS BLD VENIPUNCTURE: CPT | Mod: PN | Performed by: STUDENT IN AN ORGANIZED HEALTH CARE EDUCATION/TRAINING PROGRAM

## 2022-03-07 PROCEDURE — 3008F PR BODY MASS INDEX (BMI) DOCUMENTED: ICD-10-PCS | Mod: CPTII,S$GLB,, | Performed by: STUDENT IN AN ORGANIZED HEALTH CARE EDUCATION/TRAINING PROGRAM

## 2022-03-07 PROCEDURE — 3077F SYST BP >= 140 MM HG: CPT | Mod: CPTII,S$GLB,, | Performed by: STUDENT IN AN ORGANIZED HEALTH CARE EDUCATION/TRAINING PROGRAM

## 2022-03-07 PROCEDURE — 99999 PR PBB SHADOW E&M-EST. PATIENT-LVL IV: CPT | Mod: PBBFAC,,, | Performed by: STUDENT IN AN ORGANIZED HEALTH CARE EDUCATION/TRAINING PROGRAM

## 2022-03-07 PROCEDURE — 99214 OFFICE O/P EST MOD 30 MIN: CPT | Mod: S$GLB,,, | Performed by: STUDENT IN AN ORGANIZED HEALTH CARE EDUCATION/TRAINING PROGRAM

## 2022-03-07 PROCEDURE — 1160F PR REVIEW ALL MEDS BY PRESCRIBER/CLIN PHARMACIST DOCUMENTED: ICD-10-PCS | Mod: CPTII,S$GLB,, | Performed by: STUDENT IN AN ORGANIZED HEALTH CARE EDUCATION/TRAINING PROGRAM

## 2022-03-07 PROCEDURE — 3077F PR MOST RECENT SYSTOLIC BLOOD PRESSURE >= 140 MM HG: ICD-10-PCS | Mod: CPTII,S$GLB,, | Performed by: STUDENT IN AN ORGANIZED HEALTH CARE EDUCATION/TRAINING PROGRAM

## 2022-03-07 PROCEDURE — 1160F RVW MEDS BY RX/DR IN RCRD: CPT | Mod: CPTII,S$GLB,, | Performed by: STUDENT IN AN ORGANIZED HEALTH CARE EDUCATION/TRAINING PROGRAM

## 2022-03-07 PROCEDURE — 3080F DIAST BP >= 90 MM HG: CPT | Mod: CPTII,S$GLB,, | Performed by: STUDENT IN AN ORGANIZED HEALTH CARE EDUCATION/TRAINING PROGRAM

## 2022-03-07 PROCEDURE — 99214 PR OFFICE/OUTPT VISIT, EST, LEVL IV, 30-39 MIN: ICD-10-PCS | Mod: S$GLB,,, | Performed by: STUDENT IN AN ORGANIZED HEALTH CARE EDUCATION/TRAINING PROGRAM

## 2022-03-07 PROCEDURE — 3044F HG A1C LEVEL LT 7.0%: CPT | Mod: CPTII,S$GLB,, | Performed by: STUDENT IN AN ORGANIZED HEALTH CARE EDUCATION/TRAINING PROGRAM

## 2022-03-07 RX ORDER — SULINDAC 200 MG/1
200 TABLET ORAL 2 TIMES DAILY
COMMUNITY
Start: 2022-03-02 | End: 2022-08-25 | Stop reason: SDUPTHER

## 2022-03-07 RX ORDER — TELMISARTAN 20 MG/1
20 TABLET ORAL DAILY
Qty: 30 TABLET | Refills: 3 | Status: SHIPPED | OUTPATIENT
Start: 2022-03-07 | End: 2022-09-29

## 2022-03-07 NOTE — PROGRESS NOTES
Subjective:   Patient ID:    Name: David Aguilera  : 1965  MRN: [id number]    HPI:   David Aguilera is a 56 y.o. male presents for evaluation of Polycyhemia    Patent was referred to us for polycythemia (Hb 19/Hct: 57.0). normal wbc and plts. On further discussion patient it seems to be taking testesterone injections every 2 weeks for the past 3 years and he was told to donate blood every 3 months . Patient does have headaches/ blurry vision and having increase BP.     Reviewed his records from  office patient with hypogonadism on testosterone cypionate 200 mg every other week as well as hematospermia. Last H/H  in 2021 was 17.4/ 51.8 % with normal H/H in 2018 ( 15/47.8%). Patient denies any history of strokes or history of DVT/clots. Denies smoking history or family history of cancer.     Since his last clinic visit, had phlebotomy x2 with improvement in his Hb/hct, also last testosterone injection; 6 weeks off, Doing well otherwise, does have headaches and blurry vision from time but his BP is 180/110 and was elevated on the previous visit. Had a follow up kenneth with PCP.     Past Medical History:   Diagnosis Date    ADD (attention deficit disorder with hyperactivity) 4/15/2013    GERD (gastroesophageal reflux disease)     Hyperlipidemia     Hypertension     Hypogonadism in male     Insomnia     Secondary polycythemia        Past Surgical History:   Procedure Laterality Date    COLONOSCOPY N/A 2018    Dr. Chadwick; unremarkable; repeat in 10 years    HERNIA REPAIR      L knee scope      L shoulder surgery      VASECTOMY         Family History   Problem Relation Age of Onset    Stroke Mother 76    Colon cancer Father         ?   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   Eyes: Negative for blurred vision and visual disturbance.   Cardiovascular: Negative for chest pain, dyspnea on exertion and orthopnea.   Respiratory: Negative for cough and shortness of breath.    Hematologic/Lymphatic: Negative for adenopathy.   Skin: Negative for rash.   Musculoskeletal: Negative for back pain.   Gastrointestinal: Negative for abdominal pain, diarrhea, hemorrhoids, melena, nausea and vomiting.   Genitourinary: Negative for dysuria and frequency.   Neurological: Negative for headaches, loss of balance and weakness.   Psychiatric/Behavioral: The patient is not nervous/anxious.           Objective:     Vitals:    03/07/22 1051   BP: (!) 180/118   BP Location: Right arm   Patient Position: Sitting   BP Method: Medium (Manual)   Pulse: 69   Resp: 18   Temp: 98 °F (36.7 °C)   TempSrc: Oral   SpO2: 98%   Weight: 118.3 kg (260 lb 12.9 oz)   Height: 6' (1.829 m)        Physical Exam  Constitutional:       Appearance: Normal appearance.   HENT:      Head: Normocephalic and atraumatic.   Eyes:      General: No scleral icterus.  Neck:      Thyroid: No thyroid mass or thyroid tenderness.   Cardiovascular:      Rate and Rhythm: Normal rate and regular rhythm.      Heart sounds: Normal heart sounds.   Pulmonary:      Effort: Pulmonary effort is normal.      Breath sounds: Normal breath sounds.   Abdominal:      General: Bowel sounds are normal. There is no distension.      Palpations: Abdomen is soft. There is no mass. "   Musculoskeletal:         General: No swelling or tenderness.      Cervical back: Normal range of motion and neck supple. No tenderness.   Lymphadenopathy:      Cervical: No cervical adenopathy.   Skin:     General: Skin is warm.      Findings: No bruising, erythema or rash.   Neurological:      General: No focal deficit present.      Mental Status: He is alert and oriented to person, place, and time.   Psychiatric:         Mood and Affect: Mood normal.         Behavior: Behavior normal.             Current Outpatient Medications on File Prior to Visit   Medication Sig    dextroamphetamine-amphetamine (AMPHETAMINE SALT COMBO) 30 mg Tab Take 1 tablet by mouth 3 (three) times daily.     sulindac (CLINORIL) 200 MG Tab Take 200 mg by mouth 2 (two) times daily.    testosterone cypionate (DEPOTESTOTERONE CYPIONATE) 200 mg/mL injection INJECT 1 ML (200MG) INTRAMUSCULARLY EVERY 14 DAYS (Patient not taking: No sig reported)    [DISCONTINUED] famotidine (PEPCID) 20 MG tablet Take 20 mg by mouth 3 (three) times daily.    [DISCONTINUED] zolpidem (AMBIEN) 10 mg Tab Take 10 mg by mouth nightly.     No current facility-administered medications on file prior to visit.       CBC:  Lab Results   Component Value Date    WBC 5.13 03/07/2022    HGB 16.3 03/07/2022    HCT 48.0 03/07/2022    MCV 88 03/07/2022     03/07/2022       CMP:  Sodium   Date Value Ref Range Status   02/01/2022 139 136 - 145 mmol/L Final     Potassium   Date Value Ref Range Status   02/01/2022 3.7 3.5 - 5.1 mmol/L Final     Chloride   Date Value Ref Range Status   02/01/2022 102 95 - 110 mmol/L Final     CO2   Date Value Ref Range Status   02/01/2022 26 23 - 29 mmol/L Final     Glucose   Date Value Ref Range Status   02/01/2022 96 70 - 110 mg/dL Final     BUN   Date Value Ref Range Status   02/01/2022 16 6 - 20 mg/dL Final     Creatinine   Date Value Ref Range Status   02/01/2022 0.8 0.5 - 1.4 mg/dL Final     Calcium   Date Value Ref Range Status    02/01/2022 9.4 8.7 - 10.5 mg/dL Final     Total Protein   Date Value Ref Range Status   02/01/2022 7.8 6.0 - 8.4 g/dL Final     Albumin   Date Value Ref Range Status   02/01/2022 4.7 3.5 - 5.2 g/dL Final     Total Bilirubin   Date Value Ref Range Status   02/01/2022 0.9 0.1 - 1.0 mg/dL Final     Comment:     For infants and newborns, interpretation of results should be based  on gestational age, weight and in agreement with clinical  observations.    Premature Infant recommended reference ranges:  Up to 24 hours.............<8.0 mg/dL  Up to 48 hours............<12.0 mg/dL  3-5 days..................<15.0 mg/dL  6-29 days.................<15.0 mg/dL       Alkaline Phosphatase   Date Value Ref Range Status   02/01/2022 64 55 - 135 U/L Final     AST   Date Value Ref Range Status   02/01/2022 19 10 - 40 U/L Final     ALT   Date Value Ref Range Status   02/01/2022 21 10 - 44 U/L Final     Anion Gap   Date Value Ref Range Status   02/01/2022 11 8 - 16 mmol/L Final     eGFR if    Date Value Ref Range Status   02/01/2022 >60.0 >60 mL/min/1.73 m^2 Final     eGFR if non    Date Value Ref Range Status   02/01/2022 >60.0 >60 mL/min/1.73 m^2 Final     Comment:     Calculation used to obtain the estimated glomerular filtration  rate (eGFR) is the CKD-EPI equation.          CT Abdomen Pelvis With Contrast  Exam: CT OF THE ABDOMEN/PELVIS WITH IV CONTRAST    Clinical data: Epigastric pain. Has had hernia repair.    Technique: Direct contiguous axial CT images were acquired through the abdomen and pelvis with intravenous contrast using soft tissue and bone algorithms. Oral contrast was not administered. Reformatted/MPR images were performed. Contrast: Omnipaque 350. Amount: 100 mL. Radiation dose:  CTDIvol = 20.70 mGy, DLP = 1165 mGy x cm.    Limitations: Lack of oral contrast limits evaluation of the bowel loops.    Prior Studies: No prior studies submitted.    Findings: Lung bases:   Clear    Liver:   Unremarkable size and contour. Normal density. No evidence of mass. No evidence of dilated ducts.    Gallbladder:  Unremarkable    Spleen:  Grossly unremarkable.    Pancreas/adrenal glands:   Grossly unremarkable size, contour and density.    Kidneys:   In anatomic position. Grossly unremarkable renal size, contour and density.  Small 4-5 mm size calculus in lower pole of right kidney. No evidence of a renal mass or cyst.  Perinephric space is unremarkable.    Retroperitoneum: No enlarged retroperitoneal lymphadenopathy. The aorta and IVC appear unremarkable.    Peritoneal cavity:  No evidence of free air or ascites.    Gastrointestinal tract: No obstruction.  Diffusely edematous small bowel loops involving the terminal ileum in right lower abdomen with increased pericolonic vascularity and surrounding fat stranding .    Appendix:  Unremarkable.    Pelvis:  Solid and hollow viscera grossly unremarkable.    Osseous structures:  No acute or destructive bony process identified.    Postoperative changes noted along lower anterior abdominal wall.    IMPRESSION:    1. Diffusely edematous small bowel loops involving the terminal ileum in right lower abdomen with increased pericolonic vascularity and surrounding fat stranding -likely enteritis of infective versus inflammatory etiology.    2. Postoperative changes noted along lower anterior abdominal wall- consistent with history of hernia repair .    3. Small 4-5 mm size calculus in lower pole of right kidney.    4.  No other acute abdominopelvic process.    Recommendation: Follow up as clinically indicated.    All CT scans at this facility utilize dose modulation, iterative reconstruction, and/or weight based dosing when appropriate to reduce radiation dose to as low as reasonably achievable.    Read by:        Suzy York MD  Transcribed by: Manisha Perez  Transcribed Date: 8/2/2019 10:30:08 PM  Electronically signed by: Suzy York  MD  Date signed: 8/2/2019 11:46:57 PM     All outside records, pertinent labs and imaging reviewed.    Assessment:       1. Polycythemia    2. Testicular hypofunction    3. Hypertension, unspecified type      # Polycythemia: secondary likely due to testosterone supplements   - denies any dehydration, hx of smoking,sleep apnea, COPD  - on testosterone supplements for the past 3 months  - checking Erythropoietin; pending   - cont with 81mg ASA daily and donating blood every 3 months   - Discussion with patient about possible spacing out in his testosterone supplements frequency from every 2 weeks to every 4 weeks due to his elevated RBC and his chance of possible stroke or developing a clots, patient understood and stopped it, off since 6 weeks ago    - phlebotomy Hct<48% since its 2nd polycythemia and repeat it as needed, improved in his Hb/Hct, will cont monitoring CBC monthly but patient was instructed that if symptoms worsen to let us know to resume phlebotomy   - Might consider checking JAK2 mutation later, less likely will be positive given the normal H/H in 2018 (prior to testosterone supplements)     # Testicular hypofunction:  - following up with Dr Sha Castro   - on injection every 2 weeks, on hold for now      # Hypertension   - Possible due to above   - reach out to PCP, starting him on Telmisartan   - off note patient is on Tadalafil as well     Plan:     Polycythemia  -     CBC w/ DIFF; Future; Expected date: 03/07/2022    Testicular hypofunction    Hypertension, unspecified type       Patient queried and all questions were answered.    F/u in 4 weeks with CBC    Signed:  Radha Mckenna MD  Hematology and Oncology  [facility]    Answers for HPI/ROS submitted by the patient on 3/7/2022  appetite change : No  unexpected weight change: No  mouth sores: No

## 2022-03-07 NOTE — TELEPHONE ENCOUNTER
Call patient, help schedule nursing blood pressure check for 2 weeks, encourage patient to check blood pressure at least once daily and bring in blood pressure log

## 2022-03-10 LAB — EPO SERPL-ACNC: 6.2 MIU/ML (ref 2.6–18.5)

## 2022-04-07 ENCOUNTER — TELEPHONE (OUTPATIENT)
Dept: HEMATOLOGY/ONCOLOGY | Facility: CLINIC | Age: 57
End: 2022-04-07
Payer: COMMERCIAL

## 2022-04-07 ENCOUNTER — PATIENT MESSAGE (OUTPATIENT)
Dept: HEMATOLOGY/ONCOLOGY | Facility: CLINIC | Age: 57
End: 2022-04-07
Payer: COMMERCIAL

## 2022-06-14 NOTE — TELEPHONE ENCOUNTER
----- Message from Evelyn Lopez sent at 11/5/2018  3:32 PM CST -----  Contact: Self  Call placed to POD     Patient states his fasting medication for his colonoscopy tomorrow is not at his pharmacy     Please call to to advise [phone] (home)       [facility] - [place] - [address]  [address]  [place] [address]  Phone: [phone] Fax: [phone]        
S/w pt he said he s/w preop nurse who was able to help him.   
Gen: alert, NAD  HEENT:  NC/AT, PERR, no exophthalmos  CV:  well perfused, rrr   Pulm:  normal RR, I/E ratio and chest excursion  Abd: s/nt/nd  MSK: moving all extremities  Neuro:  non-focal  Skin:  visualized areas intact

## 2022-08-25 ENCOUNTER — OFFICE VISIT (OUTPATIENT)
Dept: RHEUMATOLOGY | Facility: CLINIC | Age: 57
End: 2022-08-25
Payer: COMMERCIAL

## 2022-08-25 VITALS — WEIGHT: 252 LBS | BODY MASS INDEX: 34.18 KG/M2 | DIASTOLIC BLOOD PRESSURE: 112 MMHG | SYSTOLIC BLOOD PRESSURE: 174 MMHG

## 2022-08-25 DIAGNOSIS — M19.90 OSTEOARTHRITIS, UNSPECIFIED OSTEOARTHRITIS TYPE, UNSPECIFIED SITE: Primary | ICD-10-CM

## 2022-08-25 DIAGNOSIS — S76.811A STRAIN OF ILIOPSOAS MUSCLE, RIGHT, INITIAL ENCOUNTER: ICD-10-CM

## 2022-08-25 DIAGNOSIS — M25.559 HIP PAIN: Primary | ICD-10-CM

## 2022-08-25 PROCEDURE — 1160F RVW MEDS BY RX/DR IN RCRD: CPT | Mod: CPTII,S$GLB,, | Performed by: INTERNAL MEDICINE

## 2022-08-25 PROCEDURE — 3008F BODY MASS INDEX DOCD: CPT | Mod: CPTII,S$GLB,, | Performed by: INTERNAL MEDICINE

## 2022-08-25 PROCEDURE — 3080F DIAST BP >= 90 MM HG: CPT | Mod: CPTII,S$GLB,, | Performed by: INTERNAL MEDICINE

## 2022-08-25 PROCEDURE — 3044F HG A1C LEVEL LT 7.0%: CPT | Mod: CPTII,S$GLB,, | Performed by: INTERNAL MEDICINE

## 2022-08-25 PROCEDURE — 4010F PR ACE/ARB THEARPY RXD/TAKEN: ICD-10-PCS | Mod: CPTII,S$GLB,, | Performed by: INTERNAL MEDICINE

## 2022-08-25 PROCEDURE — 1159F MED LIST DOCD IN RCRD: CPT | Mod: CPTII,S$GLB,, | Performed by: INTERNAL MEDICINE

## 2022-08-25 PROCEDURE — 99213 PR OFFICE/OUTPT VISIT, EST, LEVL III, 20-29 MIN: ICD-10-PCS | Mod: S$GLB,,, | Performed by: INTERNAL MEDICINE

## 2022-08-25 PROCEDURE — 3008F PR BODY MASS INDEX (BMI) DOCUMENTED: ICD-10-PCS | Mod: CPTII,S$GLB,, | Performed by: INTERNAL MEDICINE

## 2022-08-25 PROCEDURE — 3080F PR MOST RECENT DIASTOLIC BLOOD PRESSURE >= 90 MM HG: ICD-10-PCS | Mod: CPTII,S$GLB,, | Performed by: INTERNAL MEDICINE

## 2022-08-25 PROCEDURE — 4010F ACE/ARB THERAPY RXD/TAKEN: CPT | Mod: CPTII,S$GLB,, | Performed by: INTERNAL MEDICINE

## 2022-08-25 PROCEDURE — 99213 OFFICE O/P EST LOW 20 MIN: CPT | Mod: S$GLB,,, | Performed by: INTERNAL MEDICINE

## 2022-08-25 PROCEDURE — 3077F PR MOST RECENT SYSTOLIC BLOOD PRESSURE >= 140 MM HG: ICD-10-PCS | Mod: CPTII,S$GLB,, | Performed by: INTERNAL MEDICINE

## 2022-08-25 PROCEDURE — 1160F PR REVIEW ALL MEDS BY PRESCRIBER/CLIN PHARMACIST DOCUMENTED: ICD-10-PCS | Mod: CPTII,S$GLB,, | Performed by: INTERNAL MEDICINE

## 2022-08-25 PROCEDURE — 1159F PR MEDICATION LIST DOCUMENTED IN MEDICAL RECORD: ICD-10-PCS | Mod: CPTII,S$GLB,, | Performed by: INTERNAL MEDICINE

## 2022-08-25 PROCEDURE — 3077F SYST BP >= 140 MM HG: CPT | Mod: CPTII,S$GLB,, | Performed by: INTERNAL MEDICINE

## 2022-08-25 PROCEDURE — 3044F PR MOST RECENT HEMOGLOBIN A1C LEVEL <7.0%: ICD-10-PCS | Mod: CPTII,S$GLB,, | Performed by: INTERNAL MEDICINE

## 2022-08-25 RX ORDER — ZOLPIDEM TARTRATE 10 MG
10 TABLET ORAL NIGHTLY
COMMUNITY
Start: 2022-08-08

## 2022-08-25 RX ORDER — SULINDAC 200 MG/1
200 TABLET ORAL 2 TIMES DAILY
Qty: 60 TABLET | Refills: 5 | Status: SHIPPED | OUTPATIENT
Start: 2022-08-25 | End: 2022-10-05 | Stop reason: CLARIF

## 2022-08-25 NOTE — PROGRESS NOTES
[facility] RHEUMATOLOGY           Follow-up visit    Notes dictated to M*Modal. Please forgive any unintended errors.  Subjective:       Patient ID:   NAME: David Aguilera : 1965     56 y.o. male    Referring Doc: No ref. provider found  Other Physicians:    Chief Complaint:  Follow-up      HPI/Interval History:  The patient has been having some pain in the right hip radiating to the anterior thigh.  He does not recall any discrete trauma.  He does note that his hip feels like it is going to give way.  He does not note any joint swelling.  He has been taking Aleve 440 mg 3 times daily without significant benefit (he ran out of sulindac).    ROS:   GEN:    no fever, night sweats or weight loss  SKIN:   no rashes, bruising, or swelling, no Raynauds, no photosensitivity  HEENT: no changes in vision, no mouth ulcers, no sicca symptoms, no scalp tenderness, no jaw claudication.  CV:      no CP, PND, HUNT, orthopnea, no palpitations  PULM: no SOB, cough, hemoptysis, sputum or pleuritic pain  GI:        no dysphagia, no GERD, no hematemesis, no abdominal pain, nausea, vomiting, constipation, diarrhea, melanotic stools, hematochezia  :      no hematuria, dysuria  NEURO: no paresthesias, headaches, seizures  MUSCULOSKELETAL:  As above  PSYCH:   No increased insomnia, no increased anxiety, no increased depression    Past Medical/Surgical History:  Past Medical History:   Diagnosis Date    ADD (attention deficit disorder with hyperactivity) 4/15/2013    GERD (gastroesophageal reflux disease)     Hyperlipidemia     Hypertension     Hypogonadism in male     Insomnia     Secondary polycythemia      Past Surgical History:   Procedure Laterality Date    COLONOSCOPY N/A 2018    Dr. 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    Stomach cancer Neg Hx     Esophageal cancer Neg Hx     Crohn's disease Neg Hx     Ulcerative colitis Neg Hx      FAMILY HISTORY: negative for Connective Tissue Disease      Medications:    Current Outpatient Medications:     AMBIEN 10 mg Tab, Take 10 mg by mouth every evening., Disp: , Rfl:     dextroamphetamine-amphetamine (AMPHETAMINE SALT COMBO) 30 mg Tab, Take 1 tablet by mouth 3 (three) times daily. , Disp: , Rfl:     sulindac (CLINORIL) 200 MG Tab, Take 200 mg by mouth 2 (two) times daily., Disp: , Rfl:     telmisartan (MICARDIS) 20 MG Tab, Take 1 tablet (20 mg total) by mouth once daily. (Patient not taking: Reported on 8/25/2022), Disp: 30 tablet, Rfl: 3    testosterone cypionate (DEPOTESTOTERONE CYPIONATE) 200 mg/mL injection, INJECT 1 ML (200MG) INTRAMUSCULARLY EVERY 14 DAYS (Patient not taking: No sig reported), Disp: 2 mL, Rfl: 0      Objective:     Vitals:  Blood pressure (!) 174/112, weight 114.3 kg (252 lb).    Physical Examination:   GEN: wn/wd male in no apparent distress  SKIN: no rashes, no sclerodactyly, no Raynaud's, no periungual erythema, no digital tip ulcerations, no nailbed pitting  HEAD: no alopecia, no scalp tenderness, no temporal artery tenderness or induration.  EYES: no pallor, no icterus, PERRLA  ENT:  no thrush, no mucosal dryness or ulcerations, adequate oral hygiene & dentition.  NECK: supple x 6, no masses, no " thyromegaly, no lymphadenopathy.  CV:   S1 and S2 regular, no murmurs, gallop or rubs  CHEST: Normal respiratory effort;  normal breath sounds/no adventitious sounds. No signs of consolidation.  ABD: non-tender and non-distended; soft; normal bowel sounds; no rebound/guarding or tenderness. No hepatosplenomegaly.  Musculoskeletal:  Full range of motion noted in the right hip.  Juan's sign negative.  No tenderness overlying the trochanteric bursa  EXTREM: no clubbing, cyanosis or edema. normal pulses.  NEURO:  grossly intact; motor/sensory WNL; no tremors  PSYCH:  normal mood, affect and behavior    Labs:   Lab Results   Component Value Date    WBC 5.13 03/07/2022    HGB 16.3 03/07/2022    HCT 48.0 03/07/2022    MCV 88 03/07/2022     03/07/2022   CMP@  Sodium   Date Value Ref Range Status   02/01/2022 139 136 - 145 mmol/L Final     Potassium   Date Value Ref Range Status   02/01/2022 3.7 3.5 - 5.1 mmol/L Final     Chloride   Date Value Ref Range Status   02/01/2022 102 95 - 110 mmol/L Final     CO2   Date Value Ref Range Status   02/01/2022 26 23 - 29 mmol/L Final     Glucose   Date Value Ref Range Status   02/01/2022 96 70 - 110 mg/dL Final     BUN   Date Value Ref Range Status   02/01/2022 16 6 - 20 mg/dL Final     Creatinine   Date Value Ref Range Status   02/01/2022 0.8 0.5 - 1.4 mg/dL Final     Calcium   Date Value Ref Range Status   02/01/2022 9.4 8.7 - 10.5 mg/dL Final     Total Protein   Date Value Ref Range Status   02/01/2022 7.8 6.0 - 8.4 g/dL Final     Albumin   Date Value Ref Range Status   02/01/2022 4.7 3.5 - 5.2 g/dL Final     Total Bilirubin   Date Value Ref Range Status   02/01/2022 0.9 0.1 - 1.0 mg/dL Final     Comment:     For infants and newborns, interpretation of results should be based  on gestational age, weight and in agreement with clinical  observations.    Premature Infant recommended reference ranges:  Up to 24 hours.............<8.0 mg/dL  Up to 48 hours............<12.0  mg/dL  3-5 days..................<15.0 mg/dL  6-29 days.................<15.0 mg/dL       Alkaline Phosphatase   Date Value Ref Range Status   02/01/2022 64 55 - 135 U/L Final     AST   Date Value Ref Range Status   02/01/2022 19 10 - 40 U/L Final     ALT   Date Value Ref Range Status   02/01/2022 21 10 - 44 U/L Final     CRP   Date Value Ref Range Status   02/01/2022 0.12 <0.76 mg/dL Final     Rheumatoid Factor   Date Value Ref Range Status   02/01/2022 <10.0 <14.0 IU/mL Final     Comment:     Performed at:  [facility]  [id number]  : Morales Reich MD, Phone:  [phone]         Radiology/Diagnostic Studies:    None    Assessment/Discussion/Plan:   56 y.o. male with right-sided hip pain clinically suggestive of iliopsoas strain    PLAN:  I have recommended that he see Orthopedics.  Decisions about films, possible injections, and physical therapy referral should be made there.  I renewed his sulindac prescription.    RTC:  I will see him back on an as-needed basis        Electronically signed by Delonte Blevins MD      Patient notified that this office will be closing December 22, 2022. They should begin looking for another rheumatologist as soon as possible.  A list with the names and contact details of rheumatologists in the surrounding area was provided.

## 2022-08-30 DIAGNOSIS — M25.559 HIP PAIN: ICD-10-CM

## 2022-08-30 DIAGNOSIS — M25.569 KNEE PAIN, UNSPECIFIED CHRONICITY, UNSPECIFIED LATERALITY: Primary | ICD-10-CM

## 2022-08-31 DIAGNOSIS — M25.559 HIP PAIN: Primary | ICD-10-CM

## 2022-09-02 ENCOUNTER — HOSPITAL ENCOUNTER (OUTPATIENT)
Dept: RADIOLOGY | Facility: HOSPITAL | Age: 57
Discharge: HOME | End: 2022-09-02
Attending: ORTHOPAEDIC SURGERY
Payer: COMMERCIAL

## 2022-09-02 DIAGNOSIS — M25.559 HIP PAIN: ICD-10-CM

## 2022-09-02 DIAGNOSIS — M25.569 KNEE PAIN, UNSPECIFIED CHRONICITY, UNSPECIFIED LATERALITY: ICD-10-CM

## 2022-09-02 PROCEDURE — 73502 XR HIP WITH PELVIS WHEN PERFORMED, 2 OR 3  VIEWS RIGHT: ICD-10-PCS | Mod: 26,RT,, | Performed by: RADIOLOGY

## 2022-09-02 PROCEDURE — 73502 X-RAY EXAM HIP UNI 2-3 VIEWS: CPT | Mod: TC,FY,RT

## 2022-09-02 PROCEDURE — 73564 X-RAY EXAM KNEE 4 OR MORE: CPT | Mod: TC,50,FY

## 2022-09-02 PROCEDURE — 73564 XR KNEE ORTHO BILAT WITH FLEXION: ICD-10-PCS | Mod: 26,,, | Performed by: RADIOLOGY

## 2022-09-02 PROCEDURE — 73564 X-RAY EXAM KNEE 4 OR MORE: CPT | Mod: 26,,, | Performed by: RADIOLOGY

## 2022-09-02 PROCEDURE — 73502 X-RAY EXAM HIP UNI 2-3 VIEWS: CPT | Mod: 26,RT,, | Performed by: RADIOLOGY

## 2022-09-06 ENCOUNTER — OFFICE VISIT (OUTPATIENT)
Dept: ORTHOPEDICS | Facility: CLINIC | Age: 57
End: 2022-09-06
Payer: COMMERCIAL

## 2022-09-06 VITALS — RESPIRATION RATE: 17 BRPM | WEIGHT: 252 LBS | BODY MASS INDEX: 34.13 KG/M2 | HEIGHT: 72 IN

## 2022-09-06 DIAGNOSIS — M25.561 CHRONIC PAIN OF RIGHT KNEE: ICD-10-CM

## 2022-09-06 DIAGNOSIS — M70.61 TROCHANTERIC BURSITIS OF RIGHT HIP: Primary | ICD-10-CM

## 2022-09-06 DIAGNOSIS — G89.29 CHRONIC PAIN OF RIGHT KNEE: ICD-10-CM

## 2022-09-06 DIAGNOSIS — S86.912A STRAIN OF BOTH KNEES, INITIAL ENCOUNTER: ICD-10-CM

## 2022-09-06 DIAGNOSIS — S86.911A STRAIN OF BOTH KNEES, INITIAL ENCOUNTER: ICD-10-CM

## 2022-09-06 DIAGNOSIS — M25.559 HIP PAIN: ICD-10-CM

## 2022-09-06 PROCEDURE — 1159F PR MEDICATION LIST DOCUMENTED IN MEDICAL RECORD: ICD-10-PCS | Mod: CPTII,S$GLB,, | Performed by: ORTHOPAEDIC SURGERY

## 2022-09-06 PROCEDURE — 3044F HG A1C LEVEL LT 7.0%: CPT | Mod: CPTII,S$GLB,, | Performed by: ORTHOPAEDIC SURGERY

## 2022-09-06 PROCEDURE — 99999 PR PBB SHADOW E&M-EST. PATIENT-LVL III: ICD-10-PCS | Mod: PBBFAC,,, | Performed by: ORTHOPAEDIC SURGERY

## 2022-09-06 PROCEDURE — 99203 OFFICE O/P NEW LOW 30 MIN: CPT | Mod: 25,S$GLB,, | Performed by: ORTHOPAEDIC SURGERY

## 2022-09-06 PROCEDURE — 20610 DRAIN/INJ JOINT/BURSA W/O US: CPT | Mod: RT,S$GLB,, | Performed by: ORTHOPAEDIC SURGERY

## 2022-09-06 PROCEDURE — 3008F BODY MASS INDEX DOCD: CPT | Mod: CPTII,S$GLB,, | Performed by: ORTHOPAEDIC SURGERY

## 2022-09-06 PROCEDURE — 1159F MED LIST DOCD IN RCRD: CPT | Mod: CPTII,S$GLB,, | Performed by: ORTHOPAEDIC SURGERY

## 2022-09-06 PROCEDURE — 99203 PR OFFICE/OUTPT VISIT, NEW, LEVL III, 30-44 MIN: ICD-10-PCS | Mod: 25,S$GLB,, | Performed by: ORTHOPAEDIC SURGERY

## 2022-09-06 PROCEDURE — 4010F ACE/ARB THERAPY RXD/TAKEN: CPT | Mod: CPTII,S$GLB,, | Performed by: ORTHOPAEDIC SURGERY

## 2022-09-06 PROCEDURE — 20610 LARGE JOINT ASPIRATION/INJECTION: R GREATER TROCHANTERIC BURSA: ICD-10-PCS | Mod: RT,S$GLB,, | Performed by: ORTHOPAEDIC SURGERY

## 2022-09-06 PROCEDURE — 3044F PR MOST RECENT HEMOGLOBIN A1C LEVEL <7.0%: ICD-10-PCS | Mod: CPTII,S$GLB,, | Performed by: ORTHOPAEDIC SURGERY

## 2022-09-06 PROCEDURE — 99999 PR PBB SHADOW E&M-EST. PATIENT-LVL III: CPT | Mod: PBBFAC,,, | Performed by: ORTHOPAEDIC SURGERY

## 2022-09-06 PROCEDURE — 4010F PR ACE/ARB THEARPY RXD/TAKEN: ICD-10-PCS | Mod: CPTII,S$GLB,, | Performed by: ORTHOPAEDIC SURGERY

## 2022-09-06 PROCEDURE — 3008F PR BODY MASS INDEX (BMI) DOCUMENTED: ICD-10-PCS | Mod: CPTII,S$GLB,, | Performed by: ORTHOPAEDIC SURGERY

## 2022-09-06 RX ORDER — TRIAMCINOLONE ACETONIDE 40 MG/ML
60 INJECTION, SUSPENSION INTRA-ARTICULAR; INTRAMUSCULAR
Status: DISCONTINUED | OUTPATIENT
Start: 2022-09-06 | End: 2022-09-06 | Stop reason: HOSPADM

## 2022-09-06 RX ADMIN — TRIAMCINOLONE ACETONIDE 60 MG: 40 INJECTION, SUSPENSION INTRA-ARTICULAR; INTRAMUSCULAR at 01:09

## 2022-09-06 NOTE — PROGRESS NOTES
"9/6/2022    Past Medical History:   Diagnosis Date    ADD (attention deficit disorder with hyperactivity) 4/15/2013    GERD (gastroesophageal reflux disease)     Hyperlipidemia     Hypertension     Hypogonadism in male     Insomnia     Secondary polycythemia        Past Surgical History:   Procedure Laterality Date    COLONOSCOPY N/A 11/6/2018    Dr. Chadwick; unremarkable; repeat in 10 years    HERNIA REPAIR  2000    L knee scope      L shoulder surgery      VASECTOMY         Current Outpatient Medications   Medication Sig    AMBIEN 10 mg Tab Take 10 mg by mouth every evening.    dextroamphetamine-amphetamine (AMPHETAMINE SALT COMBO) 30 mg Tab Take 1 tablet by mouth 3 (three) times daily.     sulindac (CLINORIL) 200 MG Tab Take 1 tablet (200 mg total) by mouth 2 (two) times daily.    telmisartan (MICARDIS) 20 MG Tab Take 1 tablet (20 mg total) by mouth once daily. (Patient not taking: Reported on 8/25/2022)    testosterone cypionate (DEPOTESTOTERONE CYPIONATE) 200 mg/mL injection INJECT 1 ML (200MG) INTRAMUSCULARLY EVERY 14 DAYS (Patient not taking: No sig reported)     No current facility-administered medications for this visit.       Review of patient's allergies indicates:  No Known Allergies    Family History   Problem Relation Age of Onset    Stroke Mother 76    Colon cancer Father         ?    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 Denies Injury or fall. Increased pain after prolonged sitting     Right Knee - Pain     Bilat knee pain onset 2mo. Denies Injury/fall PL 2/10, Let contractions at rest and pt reports popping.     Left Knee - Pain         History of present illness:    This is a 56 y.o. year old male who complains of patient is being seen today with a 2 month on set of right hip pain no history of any fall and bilateral knee pain for about 2 months denies any fall he reports some popping in his knees    Review of Systems:    Constitution: Denies chills, fever, and sweats.  HENT: Denies headaches or blurry vision.  Cardiovascular: Denies chest pain or irregular heart beat.  Respiratory: Denies cough or shortness of breath.  Gastrointestinal: Denies abdominal pain, nausea, or vomiting.  Musculoskeletal:  Denies muscle cramps.  Neurological: Denies dizziness or focal weakness.  Psychiatric/Behavioral: Normal mental status.  Hematologic/Lymphatic: Denies bleeding problem or easy bruising/bleeding.  Skin: Denies rash or suspicious lesions.    Examination:    Vital Signs:    Vitals:    09/06/22 1318   Resp: 17   Weight: 114.3 kg (252 lb)   Height: 6' (1.829 m)   PainSc:   2       Body mass index is 34.18 kg/m².    This a well-developed, well nourished patient in no acute distress.    Alert and oriented x 3 and cooperative to examination.       Physical Exam:  Right hip-patient has excellent range of motion of the right hip has no groin pain he does have pain to palpation over the greater trochanter        Left hip-excellent range of motion of left hip no pain        Right knee-no effusion to the right knee no instability negative Lachman's no opening a medial lateral stress negative Erick sign no real pain today on examination        Left knee-full range of motion no instability negative Lachman's negative Erick sign    Imaging:  X-ray of the right hip and  pelvis showed no evidence of any significant right hips x-ray of the left knee shows some calcification in the proximal medial collateral ligament no evidence of any significant arthritic changes in the left knee the right knee showed no evidence a significant arthritic changes       Assessment: Trochanteric bursitis of right hip    Hip pain  -     Ambulatory referral/consult to Orthopedics    Strain of both knees, initial encounter      Plan:  I will go ahead inject the right greater trochanteric bursa with Kenalog today I am also going to go ahead get an MRI of his right knee this appears to be the knee which hurts the most and has been going on for several months to year      DISCLAIMER: This note may have been dictated using voice recognition software and may contain grammatical errors.     NOTE: Consult report sent to referring provider via [email or web address] EMR.

## 2022-09-06 NOTE — PROCEDURES
Large Joint Aspiration/Injection: R greater trochanteric bursa    Date/Time: 9/6/2022 1:00 PM  Performed by: Herbie Hoyt MD  Authorized by: Herbie Hoyt MD     Indications:  Pain  Local anesthetic:  Lidocaine 1% without epinephrine    Details:  Needle Size:  20 G  Approach:  Lateral  Location:  Hip  Site:  R greater trochanteric bursa  Medications:  60 mg triamcinolone acetonide 40 mg/mL

## 2022-09-21 ENCOUNTER — HOSPITAL ENCOUNTER (OUTPATIENT)
Dept: RADIOLOGY | Facility: HOSPITAL | Age: 57
Discharge: HOME | End: 2022-09-21
Attending: ORTHOPAEDIC SURGERY
Payer: COMMERCIAL

## 2022-09-21 DIAGNOSIS — G89.29 CHRONIC PAIN OF RIGHT KNEE: ICD-10-CM

## 2022-09-21 DIAGNOSIS — M25.561 CHRONIC PAIN OF RIGHT KNEE: ICD-10-CM

## 2022-09-21 PROCEDURE — 73721 MRI KNEE WITHOUT CONTRAST RIGHT: ICD-10-PCS | Mod: 26,RT,, | Performed by: RADIOLOGY

## 2022-09-21 PROCEDURE — 73721 MRI JNT OF LWR EXTRE W/O DYE: CPT | Mod: 26,RT,, | Performed by: RADIOLOGY

## 2022-09-21 PROCEDURE — 73721 MRI JNT OF LWR EXTRE W/O DYE: CPT | Mod: TC,RT

## 2022-09-23 ENCOUNTER — OFFICE VISIT (OUTPATIENT)
Dept: ORTHOPEDICS | Facility: CLINIC | Age: 57
End: 2022-09-23
Payer: COMMERCIAL

## 2022-09-23 VITALS — BODY MASS INDEX: 34.13 KG/M2 | HEIGHT: 72 IN | WEIGHT: 252 LBS

## 2022-09-23 DIAGNOSIS — S83.241A TEAR OF MEDIAL MENISCUS OF RIGHT KNEE, CURRENT, UNSPECIFIED TEAR TYPE, INITIAL ENCOUNTER: ICD-10-CM

## 2022-09-23 DIAGNOSIS — Z01.818 PREOP TESTING: ICD-10-CM

## 2022-09-23 DIAGNOSIS — M17.11 PATELLOFEMORAL ARTHRITIS OF RIGHT KNEE: ICD-10-CM

## 2022-09-23 DIAGNOSIS — M25.561 CHRONIC PAIN OF RIGHT KNEE: Primary | ICD-10-CM

## 2022-09-23 DIAGNOSIS — M70.61 TROCHANTERIC BURSITIS OF RIGHT HIP: ICD-10-CM

## 2022-09-23 DIAGNOSIS — G89.29 CHRONIC PAIN OF RIGHT KNEE: Primary | ICD-10-CM

## 2022-09-23 PROCEDURE — 1159F MED LIST DOCD IN RCRD: CPT | Mod: CPTII,S$GLB,, | Performed by: ORTHOPAEDIC SURGERY

## 2022-09-23 PROCEDURE — 99213 OFFICE O/P EST LOW 20 MIN: CPT | Mod: 25,S$GLB,, | Performed by: ORTHOPAEDIC SURGERY

## 2022-09-23 PROCEDURE — 3008F BODY MASS INDEX DOCD: CPT | Mod: CPTII,S$GLB,, | Performed by: ORTHOPAEDIC SURGERY

## 2022-09-23 PROCEDURE — 99999 PR PBB SHADOW E&M-EST. PATIENT-LVL III: ICD-10-PCS | Mod: PBBFAC,,, | Performed by: ORTHOPAEDIC SURGERY

## 2022-09-23 PROCEDURE — 20610 DRAIN/INJ JOINT/BURSA W/O US: CPT | Mod: RT,S$GLB,, | Performed by: ORTHOPAEDIC SURGERY

## 2022-09-23 PROCEDURE — 4010F PR ACE/ARB THEARPY RXD/TAKEN: ICD-10-PCS | Mod: CPTII,S$GLB,, | Performed by: ORTHOPAEDIC SURGERY

## 2022-09-23 PROCEDURE — 3044F PR MOST RECENT HEMOGLOBIN A1C LEVEL <7.0%: ICD-10-PCS | Mod: CPTII,S$GLB,, | Performed by: ORTHOPAEDIC SURGERY

## 2022-09-23 PROCEDURE — 4010F ACE/ARB THERAPY RXD/TAKEN: CPT | Mod: CPTII,S$GLB,, | Performed by: ORTHOPAEDIC SURGERY

## 2022-09-23 PROCEDURE — 3008F PR BODY MASS INDEX (BMI) DOCUMENTED: ICD-10-PCS | Mod: CPTII,S$GLB,, | Performed by: ORTHOPAEDIC SURGERY

## 2022-09-23 PROCEDURE — 20610 LARGE JOINT ASPIRATION/INJECTION: R GREATER TROCHANTERIC BURSA: ICD-10-PCS | Mod: RT,S$GLB,, | Performed by: ORTHOPAEDIC SURGERY

## 2022-09-23 PROCEDURE — 3044F HG A1C LEVEL LT 7.0%: CPT | Mod: CPTII,S$GLB,, | Performed by: ORTHOPAEDIC SURGERY

## 2022-09-23 PROCEDURE — 99999 PR PBB SHADOW E&M-EST. PATIENT-LVL III: CPT | Mod: PBBFAC,,, | Performed by: ORTHOPAEDIC SURGERY

## 2022-09-23 PROCEDURE — 99213 PR OFFICE/OUTPT VISIT, EST, LEVL III, 20-29 MIN: ICD-10-PCS | Mod: 25,S$GLB,, | Performed by: ORTHOPAEDIC SURGERY

## 2022-09-23 PROCEDURE — 1159F PR MEDICATION LIST DOCUMENTED IN MEDICAL RECORD: ICD-10-PCS | Mod: CPTII,S$GLB,, | Performed by: ORTHOPAEDIC SURGERY

## 2022-09-23 RX ORDER — CEFAZOLIN SODIUM 1 G/3ML
2 INJECTION, POWDER, FOR SOLUTION INTRAMUSCULAR; INTRAVENOUS
Status: CANCELLED | OUTPATIENT
Start: 2022-09-23

## 2022-09-23 RX ORDER — TRIAMCINOLONE ACETONIDE 40 MG/ML
60 INJECTION, SUSPENSION INTRA-ARTICULAR; INTRAMUSCULAR
Status: DISCONTINUED | OUTPATIENT
Start: 2022-09-23 | End: 2022-09-23 | Stop reason: HOSPADM

## 2022-09-23 RX ORDER — MUPIROCIN 20 MG/G
OINTMENT TOPICAL
Status: CANCELLED | OUTPATIENT
Start: 2022-09-23

## 2022-09-23 RX ADMIN — TRIAMCINOLONE ACETONIDE 60 MG: 40 INJECTION, SUSPENSION INTRA-ARTICULAR; INTRAMUSCULAR at 10:09

## 2022-09-23 NOTE — PROGRESS NOTES
"10/6/2022    Past Medical History:   Diagnosis Date    ADD (attention deficit disorder with hyperactivity) 4/15/2013    GERD (gastroesophageal reflux disease)     Hyperlipidemia     Hypertension     Hypogonadism in male     Insomnia     Secondary polycythemia        Past Surgical History:   Procedure Laterality Date    COLONOSCOPY N/A 11/6/2018    Dr. Chadwick; unremarkable; repeat in 10 years    HERNIA REPAIR  2000    L knee scope      L shoulder surgery      VASECTOMY         Current Outpatient Medications   Medication Sig    AMBIEN 10 mg Tab Take 10 mg by mouth every evening.    dextroamphetamine-amphetamine (AMPHETAMINE SALT COMBO) 30 mg Tab Take 1 tablet by mouth 3 (three) times daily.     testosterone cypionate (DEPOTESTOTERONE CYPIONATE) 200 mg/mL injection INJECT 1 ML (200MG) INTRAMUSCULARLY EVERY 14 DAYS (Patient not taking: No sig reported)    telmisartan-hydrochlorothiazide (MICARDIS HCT) 40-12.5 mg per tablet Take 1 tablet by mouth once daily.     No current facility-administered medications for this visit.       Review of patient's allergies indicates:  No Known Allergies    Family History   Problem Relation Age of Onset    Stroke Mother 76    Colon cancer Father         ?    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 PL is severe today. Ambulates without assistance. Pain is " localized posteriorly and in the anterior thigh.            History of present illness:    This is a 56 y.o. year old male who complains of patient is following up today for his right knee pain he had a recent MRI he is complaining of severe pain in his right knee pain is localized posteriorly and in the anterior patient is also been complaining of locking in his right knee for several months    Review of Systems:    Constitution: Denies chills, fever, and sweats.  HENT: Denies headaches or blurry vision.  Cardiovascular: Denies chest pain or irregular heart beat.  Respiratory: Denies cough or shortness of breath.  Gastrointestinal: Denies abdominal pain, nausea, or vomiting.  Musculoskeletal:  Denies muscle cramps.  Neurological: Denies dizziness or focal weakness.  Psychiatric/Behavioral: Normal mental status.  Hematologic/Lymphatic: Denies bleeding problem or easy bruising/bleeding.  Skin: Denies rash or suspicious lesions.    Examination:    Vital Signs:    Vitals:    09/23/22 1118   Weight: 114.3 kg (251 lb 15.8 oz)   Height: 6' (1.829 m)   PainSc:   9   PainLoc: Knee       Body mass index is 34.18 kg/m².    This a well-developed, well nourished patient in no acute distress.    Alert and oriented x 3 and cooperative to examination.       Physical Exam: Right- leg patient has some discomfort in his right hip area is not in the groin his pain appears to be over the greater trochanter he has full range of motion of the right hip with no pain on examination of his right knee he has a slight effusion he has some discomfort and pain over the posteromedial joint line he also does not appear to have any significant discomfort in his near his quadriceps insertion most of his pain appears to be along the lateral IT band in the greater trochanter area    Imaging:  The MRI of the right knee showed a tear in the posterior horn and body of the medial meniscus it showed moderate patellofemoral arthritis a mild effusion to the  knee it also showed moderate quadriceps tendinitis and mild interstitial tearing of the quadriceps tendon       Assessment: Chronic pain of right knee    Trochanteric bursitis of right hip  -     Discontinue: triamcinolone acetonide injection 60 mg  -     Vital signs; Standing  -     Chlorohexidine Gluconate Bath; Standing  -     mupirocin 2 % ointment  -     Place in Outpatient; Standing  -     ceFAZolin injection 2 g  -     Cancel: Comprehensive metabolic panel; Standing  -     Cancel: CBC auto differential; Standing  -     Cancel: EKG 12-lead; Standing  -     Cancel: X-Ray Chest PA And Lateral; Standing    Preop testing  -     Vital signs; Standing  -     Cleanse with Chlorhexidine (CHG); Standing  -     Diet NPO; Standing  -     Place XOCHITL hose; Standing  -     Place sequential compression device; Standing  -     Chlorohexidine Gluconate Bath; Standing  -     mupirocin 2 % ointment  -     Case Request Operating Room: ARTHROSCOPY, KNEE, WITH MENISCECTOMY  -     Full code; Standing  -     Place in Outpatient; Standing  -     ceFAZolin injection 2 g  -     Cancel: Comprehensive metabolic panel; Standing  -     Cancel: CBC auto differential; Standing  -     Cancel: EKG 12-lead; Standing  -     Cancel: X-Ray Chest PA And Lateral; Standing    Tear of medial meniscus of right knee, current, unspecified tear type, initial encounter  -     Large Joint Aspiration/Injection: R greater trochanteric bursa  -     Cleanse with Chlorhexidine (CHG); Standing  -     Diet NPO; Standing  -     IP VTE LOW RISK PATIENT; Standing  -     Place XOCHITL hose; Standing  -     Place sequential compression device; Standing  -     Case Request Operating Room: ARTHROSCOPY, KNEE, WITH MENISCECTOMY  -     Full code; Standing    Patellofemoral arthritis of right knee  -     Large Joint Aspiration/Injection: R greater trochanteric bursa  -     Cleanse with Chlorhexidine (CHG); Standing  -     Diet NPO; Standing  -     IP VTE LOW RISK PATIENT;  Standing  -     Place XOCHITL hose; Standing  -     Place sequential compression device; Standing  -     Case Request Operating Room: ARTHROSCOPY, KNEE, WITH MENISCECTOMY  -     Full code; Standing      Plan:  The patient wishes to proceed with arthroscopy to the right knee he is having a lot a locking he is also going to shot away from a lot of jumping or squatting which could aggravate the quadriceps insertion and were going to go ahead and give him an injection near the greater trochanter with Kenalog      DISCLAIMER: This note may have been dictated using voice recognition software and may contain grammatical errors.     NOTE: Consult report sent to referring provider via [employer] EMR.

## 2022-09-23 NOTE — PROCEDURES
Large Joint Aspiration/Injection: R greater trochanteric bursa    Date/Time: 9/23/2022 10:30 AM  Performed by: Herbie Hoyt MD  Authorized by: Herbie Hoyt MD     Indications:  Pain  Local anesthetic:  Lidocaine 1% without epinephrine    Details:  Needle Size:  20 G  Approach:  Lateral  Location:  Hip  Site:  R greater trochanteric bursa  Medications:  60 mg triamcinolone acetonide 40 mg/mL

## 2022-09-23 NOTE — H&P (VIEW-ONLY)
"10/6/2022    Past Medical History:   Diagnosis Date    ADD (attention deficit disorder with hyperactivity) 4/15/2013    GERD (gastroesophageal reflux disease)     Hyperlipidemia     Hypertension     Hypogonadism in male     Insomnia     Secondary polycythemia        Past Surgical History:   Procedure Laterality Date    COLONOSCOPY N/A 11/6/2018    Dr. Chadwick; unremarkable; repeat in 10 years    HERNIA REPAIR  2000    L knee scope      L shoulder surgery      VASECTOMY         Current Outpatient Medications   Medication Sig    AMBIEN 10 mg Tab Take 10 mg by mouth every evening.    dextroamphetamine-amphetamine (AMPHETAMINE SALT COMBO) 30 mg Tab Take 1 tablet by mouth 3 (three) times daily.     testosterone cypionate (DEPOTESTOTERONE CYPIONATE) 200 mg/mL injection INJECT 1 ML (200MG) INTRAMUSCULARLY EVERY 14 DAYS (Patient not taking: No sig reported)    telmisartan-hydrochlorothiazide (MICARDIS HCT) 40-12.5 mg per tablet Take 1 tablet by mouth once daily.     No current facility-administered medications for this visit.       Review of patient's allergies indicates:  No Known Allergies    Family History   Problem Relation Age of Onset    Stroke Mother 76    Colon cancer Father         ?    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 PL is severe today. Ambulates without assistance. Pain is " localized posteriorly and in the anterior thigh.            History of present illness:    This is a 56 y.o. year old male who complains of patient is following up today for his right knee pain he had a recent MRI he is complaining of severe pain in his right knee pain is localized posteriorly and in the anterior patient is also been complaining of locking in his right knee for several months    Review of Systems:    Constitution: Denies chills, fever, and sweats.  HENT: Denies headaches or blurry vision.  Cardiovascular: Denies chest pain or irregular heart beat.  Respiratory: Denies cough or shortness of breath.  Gastrointestinal: Denies abdominal pain, nausea, or vomiting.  Musculoskeletal:  Denies muscle cramps.  Neurological: Denies dizziness or focal weakness.  Psychiatric/Behavioral: Normal mental status.  Hematologic/Lymphatic: Denies bleeding problem or easy bruising/bleeding.  Skin: Denies rash or suspicious lesions.    Examination:    Vital Signs:    Vitals:    09/23/22 1118   Weight: 114.3 kg (251 lb 15.8 oz)   Height: 6' (1.829 m)   PainSc:   9   PainLoc: Knee       Body mass index is 34.18 kg/m².    This a well-developed, well nourished patient in no acute distress.    Alert and oriented x 3 and cooperative to examination.       Physical Exam: Right- leg patient has some discomfort in his right hip area is not in the groin his pain appears to be over the greater trochanter he has full range of motion of the right hip with no pain on examination of his right knee he has a slight effusion he has some discomfort and pain over the posteromedial joint line he also does not appear to have any significant discomfort in his near his quadriceps insertion most of his pain appears to be along the lateral IT band in the greater trochanter area    Imaging:  The MRI of the right knee showed a tear in the posterior horn and body of the medial meniscus it showed moderate patellofemoral arthritis a mild effusion to the  knee it also showed moderate quadriceps tendinitis and mild interstitial tearing of the quadriceps tendon       Assessment: Chronic pain of right knee    Trochanteric bursitis of right hip  -     Discontinue: triamcinolone acetonide injection 60 mg  -     Vital signs; Standing  -     Chlorohexidine Gluconate Bath; Standing  -     mupirocin 2 % ointment  -     Place in Outpatient; Standing  -     ceFAZolin injection 2 g  -     Cancel: Comprehensive metabolic panel; Standing  -     Cancel: CBC auto differential; Standing  -     Cancel: EKG 12-lead; Standing  -     Cancel: X-Ray Chest PA And Lateral; Standing    Preop testing  -     Vital signs; Standing  -     Cleanse with Chlorhexidine (CHG); Standing  -     Diet NPO; Standing  -     Place XOCHITL hose; Standing  -     Place sequential compression device; Standing  -     Chlorohexidine Gluconate Bath; Standing  -     mupirocin 2 % ointment  -     Case Request Operating Room: ARTHROSCOPY, KNEE, WITH MENISCECTOMY  -     Full code; Standing  -     Place in Outpatient; Standing  -     ceFAZolin injection 2 g  -     Cancel: Comprehensive metabolic panel; Standing  -     Cancel: CBC auto differential; Standing  -     Cancel: EKG 12-lead; Standing  -     Cancel: X-Ray Chest PA And Lateral; Standing    Tear of medial meniscus of right knee, current, unspecified tear type, initial encounter  -     Large Joint Aspiration/Injection: R greater trochanteric bursa  -     Cleanse with Chlorhexidine (CHG); Standing  -     Diet NPO; Standing  -     IP VTE LOW RISK PATIENT; Standing  -     Place XOCHITL hose; Standing  -     Place sequential compression device; Standing  -     Case Request Operating Room: ARTHROSCOPY, KNEE, WITH MENISCECTOMY  -     Full code; Standing    Patellofemoral arthritis of right knee  -     Large Joint Aspiration/Injection: R greater trochanteric bursa  -     Cleanse with Chlorhexidine (CHG); Standing  -     Diet NPO; Standing  -     IP VTE LOW RISK PATIENT;  Standing  -     Place XOCHITL hose; Standing  -     Place sequential compression device; Standing  -     Case Request Operating Room: ARTHROSCOPY, KNEE, WITH MENISCECTOMY  -     Full code; Standing      Plan:  The patient wishes to proceed with arthroscopy to the right knee he is having a lot a locking he is also going to shot away from a lot of jumping or squatting which could aggravate the quadriceps insertion and were going to go ahead and give him an injection near the greater trochanter with Kenalog      DISCLAIMER: This note may have been dictated using voice recognition software and may contain grammatical errors.     NOTE: Consult report sent to referring provider via [email or web address] EMR.

## 2022-09-29 ENCOUNTER — OFFICE VISIT (OUTPATIENT)
Dept: FAMILY MEDICINE | Facility: CLINIC | Age: 57
End: 2022-09-29
Payer: COMMERCIAL

## 2022-09-29 VITALS
HEIGHT: 72 IN | WEIGHT: 244.38 LBS | SYSTOLIC BLOOD PRESSURE: 160 MMHG | BODY MASS INDEX: 33.1 KG/M2 | HEART RATE: 60 BPM | OXYGEN SATURATION: 95 % | DIASTOLIC BLOOD PRESSURE: 100 MMHG

## 2022-09-29 DIAGNOSIS — Z23 IMMUNIZATION DUE: ICD-10-CM

## 2022-09-29 DIAGNOSIS — I10 HYPERTENSION, UNSPECIFIED TYPE: Primary | ICD-10-CM

## 2022-09-29 PROCEDURE — 90686 FLU VACCINE (QUAD) GREATER THAN OR EQUAL TO 3YO PRESERVATIVE FREE IM: ICD-10-PCS | Mod: S$GLB,,, | Performed by: FAMILY MEDICINE

## 2022-09-29 PROCEDURE — 99214 OFFICE O/P EST MOD 30 MIN: CPT | Mod: 25,S$GLB,, | Performed by: FAMILY MEDICINE

## 2022-09-29 PROCEDURE — 3080F DIAST BP >= 90 MM HG: CPT | Mod: CPTII,S$GLB,, | Performed by: FAMILY MEDICINE

## 2022-09-29 PROCEDURE — 3008F PR BODY MASS INDEX (BMI) DOCUMENTED: ICD-10-PCS | Mod: CPTII,S$GLB,, | Performed by: FAMILY MEDICINE

## 2022-09-29 PROCEDURE — 1159F PR MEDICATION LIST DOCUMENTED IN MEDICAL RECORD: ICD-10-PCS | Mod: CPTII,S$GLB,, | Performed by: FAMILY MEDICINE

## 2022-09-29 PROCEDURE — 3008F BODY MASS INDEX DOCD: CPT | Mod: CPTII,S$GLB,, | Performed by: FAMILY MEDICINE

## 2022-09-29 PROCEDURE — 3077F SYST BP >= 140 MM HG: CPT | Mod: CPTII,S$GLB,, | Performed by: FAMILY MEDICINE

## 2022-09-29 PROCEDURE — 4010F ACE/ARB THERAPY RXD/TAKEN: CPT | Mod: CPTII,S$GLB,, | Performed by: FAMILY MEDICINE

## 2022-09-29 PROCEDURE — 3044F PR MOST RECENT HEMOGLOBIN A1C LEVEL <7.0%: ICD-10-PCS | Mod: CPTII,S$GLB,, | Performed by: FAMILY MEDICINE

## 2022-09-29 PROCEDURE — 1159F MED LIST DOCD IN RCRD: CPT | Mod: CPTII,S$GLB,, | Performed by: FAMILY MEDICINE

## 2022-09-29 PROCEDURE — 3044F HG A1C LEVEL LT 7.0%: CPT | Mod: CPTII,S$GLB,, | Performed by: FAMILY MEDICINE

## 2022-09-29 PROCEDURE — 90471 IMMUNIZATION ADMIN: CPT | Mod: S$GLB,,, | Performed by: FAMILY MEDICINE

## 2022-09-29 PROCEDURE — 90471 FLU VACCINE (QUAD) GREATER THAN OR EQUAL TO 3YO PRESERVATIVE FREE IM: ICD-10-PCS | Mod: S$GLB,,, | Performed by: FAMILY MEDICINE

## 2022-09-29 PROCEDURE — 99999 PR PBB SHADOW E&M-EST. PATIENT-LVL III: ICD-10-PCS | Mod: PBBFAC,,, | Performed by: FAMILY MEDICINE

## 2022-09-29 PROCEDURE — 3077F PR MOST RECENT SYSTOLIC BLOOD PRESSURE >= 140 MM HG: ICD-10-PCS | Mod: CPTII,S$GLB,, | Performed by: FAMILY MEDICINE

## 2022-09-29 PROCEDURE — 90677 PNEUMOCOCCAL CONJUGATE VACCINE 20-VALENT: ICD-10-PCS | Mod: S$GLB,,, | Performed by: FAMILY MEDICINE

## 2022-09-29 PROCEDURE — 90677 PCV20 VACCINE IM: CPT | Mod: S$GLB,,, | Performed by: FAMILY MEDICINE

## 2022-09-29 PROCEDURE — 90472 IMMUNIZATION ADMIN EACH ADD: CPT | Mod: S$GLB,,, | Performed by: FAMILY MEDICINE

## 2022-09-29 PROCEDURE — 99214 PR OFFICE/OUTPT VISIT, EST, LEVL IV, 30-39 MIN: ICD-10-PCS | Mod: 25,S$GLB,, | Performed by: FAMILY MEDICINE

## 2022-09-29 PROCEDURE — 4010F PR ACE/ARB THEARPY RXD/TAKEN: ICD-10-PCS | Mod: CPTII,S$GLB,, | Performed by: FAMILY MEDICINE

## 2022-09-29 PROCEDURE — 90472 PNEUMOCOCCAL CONJUGATE VACCINE 20-VALENT: ICD-10-PCS | Mod: S$GLB,,, | Performed by: FAMILY MEDICINE

## 2022-09-29 PROCEDURE — 99999 PR PBB SHADOW E&M-EST. PATIENT-LVL III: CPT | Mod: PBBFAC,,, | Performed by: FAMILY MEDICINE

## 2022-09-29 PROCEDURE — 3080F PR MOST RECENT DIASTOLIC BLOOD PRESSURE >= 90 MM HG: ICD-10-PCS | Mod: CPTII,S$GLB,, | Performed by: FAMILY MEDICINE

## 2022-09-29 PROCEDURE — 90686 IIV4 VACC NO PRSV 0.5 ML IM: CPT | Mod: S$GLB,,, | Performed by: FAMILY MEDICINE

## 2022-09-29 RX ORDER — TELMISARTAN AND HYDROCHLORTHIAZIDE 40; 12.5 MG/1; MG/1
1 TABLET ORAL DAILY
Qty: 90 TABLET | Refills: 3 | Status: SHIPPED | OUTPATIENT
Start: 2022-09-29 | End: 2023-03-20 | Stop reason: SDUPTHER

## 2022-10-04 ENCOUNTER — TELEPHONE (OUTPATIENT)
Dept: ORTHOPEDICS | Facility: CLINIC | Age: 57
End: 2022-10-04
Payer: COMMERCIAL

## 2022-10-04 NOTE — TELEPHONE ENCOUNTER
----- Message from Humberto Figueroa sent at 10/4/2022  3:25 PM CDT -----  Type: Needs Medical Advice  Who Called:  pt  Symptoms (please be specific):  pt said his insurance company just called and said they denied a procedure and they did not say what procedure and he wanted to make sure it was not the procedure on 10/10-please call and advise  Best Call Back Number: [phone] (home)     Additional Information: thank you

## 2022-10-04 NOTE — TELEPHONE ENCOUNTER
Returned call. Pt received call from insurance company stating scheduled procedure was denied. There is a note on referral stating: Dx and procedure does not match. Please correct and advise once corrected so auth could be obtained.     Will look into this and send a message to pre cert dept agent working case to address note, and follow up with pt upon their response.

## 2022-10-04 NOTE — TELEPHONE ENCOUNTER
Procedure diagnosis have been updated to coincide with procedure. Message sent to pre cert to re-submit for approval.

## 2022-10-05 ENCOUNTER — HOSPITAL ENCOUNTER (OUTPATIENT)
Dept: RADIOLOGY | Facility: HOSPITAL | Age: 57
Discharge: HOME | End: 2022-10-05
Attending: ORTHOPAEDIC SURGERY
Payer: COMMERCIAL

## 2022-10-05 ENCOUNTER — HOSPITAL ENCOUNTER (OUTPATIENT)
Dept: PREADMISSION TESTING | Facility: HOSPITAL | Age: 57
Discharge: HOME | End: 2022-10-05
Attending: ORTHOPAEDIC SURGERY
Payer: COMMERCIAL

## 2022-10-05 VITALS — WEIGHT: 244 LBS | BODY MASS INDEX: 33.05 KG/M2 | HEIGHT: 72 IN

## 2022-10-05 DIAGNOSIS — M70.61 TROCHANTERIC BURSITIS OF RIGHT HIP: ICD-10-CM

## 2022-10-05 DIAGNOSIS — Z01.818 PREOP TESTING: ICD-10-CM

## 2022-10-05 LAB
ALBUMIN SERPL BCP-MCNC: 4.2 G/DL (ref 3.5–5.2)
ALP SERPL-CCNC: 65 U/L (ref 55–135)
ALT SERPL W/O P-5'-P-CCNC: 32 U/L (ref 10–44)
ANION GAP SERPL CALC-SCNC: 9 MMOL/L (ref 8–16)
AST SERPL-CCNC: 17 U/L (ref 10–40)
BASOPHILS # BLD AUTO: 0.04 K/UL (ref 0–0.2)
BASOPHILS NFR BLD: 0.6 % (ref 0–1.9)
BILIRUB SERPL-MCNC: 1 MG/DL (ref 0.1–1)
BUN SERPL-MCNC: 18 MG/DL (ref 6–20)
CALCIUM SERPL-MCNC: 9.6 MG/DL (ref 8.7–10.5)
CHLORIDE SERPL-SCNC: 102 MMOL/L (ref 95–110)
CO2 SERPL-SCNC: 27 MMOL/L (ref 23–29)
CREAT SERPL-MCNC: 1.1 MG/DL (ref 0.5–1.4)
DIFFERENTIAL METHOD: ABNORMAL
EOSINOPHIL # BLD AUTO: 0.2 K/UL (ref 0–0.5)
EOSINOPHIL NFR BLD: 2.9 % (ref 0–8)
ERYTHROCYTE [DISTWIDTH] IN BLOOD BY AUTOMATED COUNT: 12.8 % (ref 11.5–14.5)
EST. GFR  (NO RACE VARIABLE): >60 ML/MIN/1.73 M^2
GLUCOSE SERPL-MCNC: 114 MG/DL (ref 70–110)
HCT VFR BLD AUTO: 50.3 % (ref 40–54)
HGB BLD-MCNC: 16.9 G/DL (ref 14–18)
IMM GRANULOCYTES # BLD AUTO: 0.02 K/UL (ref 0–0.04)
IMM GRANULOCYTES NFR BLD AUTO: 0.3 % (ref 0–0.5)
LYMPHOCYTES # BLD AUTO: 1.7 K/UL (ref 1–4.8)
LYMPHOCYTES NFR BLD: 25.3 % (ref 18–48)
MCH RBC QN AUTO: 30.5 PG (ref 27–31)
MCHC RBC AUTO-ENTMCNC: 33.6 G/DL (ref 32–36)
MCV RBC AUTO: 91 FL (ref 82–98)
MONOCYTES # BLD AUTO: 0.5 K/UL (ref 0.3–1)
MONOCYTES NFR BLD: 6.7 % (ref 4–15)
NEUTROPHILS # BLD AUTO: 4.4 K/UL (ref 1.8–7.7)
NEUTROPHILS NFR BLD: 64.2 % (ref 38–73)
NRBC BLD-RTO: 0 /100 WBC
PLATELET # BLD AUTO: 195 K/UL (ref 150–450)
PMV BLD AUTO: 9 FL (ref 9.2–12.9)
POTASSIUM SERPL-SCNC: 4.4 MMOL/L (ref 3.5–5.1)
PROT SERPL-MCNC: 7.4 G/DL (ref 6–8.4)
RBC # BLD AUTO: 5.54 M/UL (ref 4.6–6.2)
SODIUM SERPL-SCNC: 138 MMOL/L (ref 136–145)
WBC # BLD AUTO: 6.88 K/UL (ref 3.9–12.7)

## 2022-10-05 PROCEDURE — 71046 X-RAY EXAM CHEST 2 VIEWS: CPT | Mod: TC,FY

## 2022-10-05 PROCEDURE — 80053 COMPREHEN METABOLIC PANEL: CPT | Performed by: ORTHOPAEDIC SURGERY

## 2022-10-05 PROCEDURE — 93005 ELECTROCARDIOGRAM TRACING: CPT

## 2022-10-05 PROCEDURE — 85025 COMPLETE CBC W/AUTO DIFF WBC: CPT | Performed by: ORTHOPAEDIC SURGERY

## 2022-10-05 PROCEDURE — 93010 ELECTROCARDIOGRAM REPORT: CPT | Mod: ,,, | Performed by: INTERNAL MEDICINE

## 2022-10-05 PROCEDURE — 36415 COLL VENOUS BLD VENIPUNCTURE: CPT | Performed by: ORTHOPAEDIC SURGERY

## 2022-10-05 PROCEDURE — 99900103 DSU ONLY-NO CHARGE-INITIAL HR (STAT)

## 2022-10-05 PROCEDURE — 93010 EKG 12-LEAD: ICD-10-PCS | Mod: ,,, | Performed by: INTERNAL MEDICINE

## 2022-10-05 PROCEDURE — 71046 XR CHEST PA AND LATERAL: ICD-10-PCS | Mod: 26,,, | Performed by: RADIOLOGY

## 2022-10-05 PROCEDURE — 71046 X-RAY EXAM CHEST 2 VIEWS: CPT | Mod: 26,,, | Performed by: RADIOLOGY

## 2022-10-05 PROCEDURE — 99900104 DSU ONLY-NO CHARGE-EA ADD'L HR (STAT)

## 2022-10-05 NOTE — DISCHARGE INSTRUCTIONS
To confirm, Your doctor has instructed you that surgery is scheduled for: 10/10/22   JOSE [phone]    Please report to [facility] the morning of surgery. You must check-in and receive a wristband before going to your procedure.    Pre-Op will call the afternoon prior to surgery between 1:00 and 6:00 PM with the final arrival time.  Phone number: [phone]    PLEASE NOTE:  The surgery schedule has many variables which may affect the time of your surgery case.  Family members should be available if your surgery time changes.  Plan to be here the day of your procedure between 4-6 hours.    MEDICATIONS:  TAKE ONLY THESE MEDICATIONS WITH A SMALL SIP OF WATER THE MORNING OF YOUR PROCEDURE:      SEE LIST    DO NOT TAKE THESE MEDICATIONS 5-7 DAYS PRIOR to your procedure or per your surgeon's request:   ASPIRIN, ALEVE, ADVIL, IBUPROFEN, FISH OIL VITAMIN E, HERBALS    (May take Tylenol)    ONLY if you are prescribed any types of blood thinners such as:  Aspirin, Coumadin, Plavix, Pradaxa, Xarelto, Aggrenox, Effient, Eliquis, Savasya, Brilinta, or any other, ask your surgeon whether you should stop taking them and how long before surgery you should stop.  You may also need to verify with the prescribing physician if it is ok to stop your medication.      INSTRUCTIONS IMPORTANT!!  Do not eat or drink anything between midnight and the time of your procedure- this includes gum, mints, and candy.  Do not smoke or drink alcoholic beverages 24 hours prior to your procedure.  Shower the night before AND the morning of your procedure with a Chlorhexidine wash such as Hibiclens or Dial antibacterial soap from the neck down.  Do not get it on your face or in your eyes.  You may use your own shampoo and face wash. This helps your skin to be as bacteria free as possible.    If you wear contact lenses, dentures, hearing aids or glasses, bring a container to put them in during surgery and give  to a family member for safe keeping.  Please leave all jewelry, piercing's and valuables at home.   DO NOT remove hair from the surgery site.  Do not shave the incision site unless you are given specific instructions to do so.    ONLY if you have been diagnosed with sleep apnea please bring your C-PAP machine.  ONLY if you wear home oxygen please bring your portable oxygen tank the day of your procedure.  ONLY if you have a history of OPEN HEART SURGERY you will need a clearance from your Cardiologist per Anesthesia.      ONLY for patients requiring bowel prep, written instructions will be given by your doctor's office.  ONLY if you have a neuro stimulator, please bring the controller with you the morning of surgery  ONLY if a type and screen test is needed before surgery, please return:  If your doctor has scheduled you for an overnight stay, bring a small overnight bag with any personal items you need.  Make arrangements in advance for transportation home by a responsible adult.  It is not safe to drive a vehicle during the 24 hours after anesthesia.      [facility] Visitor Policy    Effective September 26, 2022    [facility] will resume routine visitation for COVID-19 negative patients, including inpatients, outpatients, and procedural areas, in accordance with local [place] procedures.    All [facility] facilities and properties are tobacco free.  Smoking is NOT allowed.   If you have any questions about these instructions, call Pre-Op Admit  Nursing at [phone] or the Pre-Op Day Surgery Unit at [phone].

## 2022-10-06 ENCOUNTER — TELEPHONE (OUTPATIENT)
Dept: ORTHOPEDICS | Facility: CLINIC | Age: 57
End: 2022-10-06
Payer: COMMERCIAL

## 2022-10-06 ENCOUNTER — CLINICAL SUPPORT (OUTPATIENT)
Dept: FAMILY MEDICINE | Facility: CLINIC | Age: 57
End: 2022-10-06
Payer: COMMERCIAL

## 2022-10-06 VITALS
SYSTOLIC BLOOD PRESSURE: 132 MMHG | RESPIRATION RATE: 16 BRPM | BODY MASS INDEX: 32.65 KG/M2 | HEART RATE: 84 BPM | DIASTOLIC BLOOD PRESSURE: 86 MMHG | WEIGHT: 240.75 LBS | OXYGEN SATURATION: 98 %

## 2022-10-06 PROCEDURE — 99999 PR PBB SHADOW E&M-EST. PATIENT-LVL III: ICD-10-PCS | Mod: PBBFAC,,,

## 2022-10-06 PROCEDURE — 99999 PR PBB SHADOW E&M-EST. PATIENT-LVL III: CPT | Mod: PBBFAC,,,

## 2022-10-06 NOTE — PROGRESS NOTES
Pt here for BP check.      ,      Blood pressure reading after 15 minutes was 132/86, Pulse 82  Dr. Woody  notified.    David Aguilera 56 y.o. male is here today for Blood Pressure check.   History of HTN yes.    Review of patient's allergies indicates:  No Known Allergies  Creatinine   Date Value Ref Range Status   10/05/2022 1.1 0.5 - 1.4 mg/dL Final     Sodium   Date Value Ref Range Status   10/05/2022 138 136 - 145 mmol/L Final     Potassium   Date Value Ref Range Status   10/05/2022 4.4 3.5 - 5.1 mmol/L Final   ]  Patient verifies taking blood pressure medications on a regular basis at the same time of the day.     Current Outpatient Medications:     AMBIEN 10 mg Tab, Take 10 mg by mouth every evening., Disp: , Rfl:     dextroamphetamine-amphetamine (AMPHETAMINE SALT COMBO) 30 mg Tab, Take 1 tablet by mouth 3 (three) times daily. , Disp: , Rfl:     telmisartan-hydrochlorothiazide (MICARDIS HCT) 40-12.5 mg per tablet, Take 1 tablet by mouth once daily., Disp: 90 tablet, Rfl: 3    testosterone cypionate (DEPOTESTOTERONE CYPIONATE) 200 mg/mL injection, INJECT 1 ML (200MG) INTRAMUSCULARLY EVERY 14 DAYS (Patient not taking: Reported on 9/29/2022), Disp: 2 mL, Rfl: 0  Does patient have record of home blood pressure readings no.   Last dose of blood pressure medication was taken at 11:00 pm last night   Patient is asymptomatic.

## 2022-10-06 NOTE — TELEPHONE ENCOUNTER
----- Message from Kandy Guevara sent at 10/6/2022 10:17 AM CDT -----  Contact: Self  Type:  Patient Returning Call    Who Called:  Patient  Who Left Message for Patient:  Sydni  Does the patient know what this is regarding?:  patients surgery  Best Call Back Number:  [phone]  Additional Information:  Thank You.

## 2022-10-06 NOTE — TELEPHONE ENCOUNTER
----- Message from Dee Lujan sent at 10/5/2022 11:09 AM CDT -----  Regarding: RE: Scheduled Surgery for 10/10/2022  Yair Troy,    I received the correct Dx the day I requested it and was submitted. Although, that was not the reason of the Denial.    A 3month trial of doctor ordered treatment that has not helped is needed.  This is part of [facility] medical policies.  Peer to Peer is requested  [phone] REF#[id number].  This letter has been emailed to you.    Thanks    ----- Message -----  From: Sydni Clancy LPN  Sent: 10/4/2022   6:04 PM CDT  To: Dee Lujan  Subject: Scheduled Surgery for 10/10/2022                 Fela Price,     The above attached patient called the office stating his insurance company informed them surgery was denied. Not sure who received initial message regarding noted diagnosis and procedure mismatch. Glad the patient reached out today. I did look into this, and was able to addend the procedure diagnosis and remove the incorrect one that was attached to original order. I have associated new appropriate diagnosis which directly coincide with procedure. Please resubmit for approval and update us on the status. Your assistance in this matter is greatly appreciated. Have a great day.     Kind Regards,     Physician: Herbie Hoyt MD  Contact: Sydni Clancy LPN  Phone: [phone]   Fax: [phone]

## 2022-10-06 NOTE — TELEPHONE ENCOUNTER
Reached out to patient and spoke several times throughout the day after communicating with his insurance company for reconsideration of scheduled right knee scope on 10/10/2022. Resubmitted office visit notes, mri results, and office visit notes from another provider regarding pt's knee pain. Pt has been treated for knee pain for over an 8 mo period, and reports continued knee pain. Will discuss with provider tomorrow. Spoke with Efren regarding Case#[id number] and reconsideration. She recommended us to resubmit ppwk and allow 24hrs for response, before considering P2P. Will follow up around this time tomorrow.

## 2022-10-06 NOTE — TELEPHONE ENCOUNTER
----- Message from Dee Lujan sent at 10/6/2022  7:13 AM CDT -----  Regarding: RE: Scheduled Surgery for 10/10/2022  It's never a problem Sydni!  ----- Message -----  From: Sydni Clancy LPN  Sent: 10/5/2022   5:12 PM CDT  To: Dee Lujna  Subject: RE: Scheduled Surgery for 10/10/2022             Understood. I will scan letter into the patient's chart and follow up with him tomorrow. Thank you for your assistance with this. Have a nice evening.   ----- Message -----  From: Dee Ljuan  Sent: 10/5/2022  11:12 AM CDT  To: Sydni Clancy LPN  Subject: RE: Scheduled Surgery for 10/10/2022             Hi Sydni,    I received the correct Dx the day I requested it and was submitted. Although, that was not the reason of the Denial.    A 3month trial of doctor ordered treatment that has not helped is needed.  This is part of [facility] medical policies.  Peer to Peer is requested  [phone] REF#[id number].  This letter has been emailed to you.    Thanks    ----- Message -----  From: Sydni Clancy LPN  Sent: 10/4/2022   6:04 PM CDT  To: Dee Lujan  Subject: Scheduled Surgery for 10/10/2022                 Fela Price,     The above attached patient called the office stating his insurance company informed them surgery was denied. Not sure who received initial message regarding noted diagnosis and procedure mismatch. Glad the patient reached out today. I did look into this, and was able to addend the procedure diagnosis and remove the incorrect one that was attached to original order. I have associated new appropriate diagnosis which directly coincide with procedure. Please resubmit for approval and update us on the status. Your assistance in this matter is greatly appreciated. Have a great day.     Kind Regards,     Physician: Herbie Hoyt MD  Contact: Sydni Clancy LPN  Phone: [phone]   Fax: [phone]

## 2022-10-08 ENCOUNTER — ANESTHESIA EVENT (OUTPATIENT)
Dept: SURGERY | Facility: HOSPITAL | Age: 57
End: 2022-10-08
Payer: COMMERCIAL

## 2022-10-10 ENCOUNTER — HOSPITAL ENCOUNTER (OUTPATIENT)
Facility: HOSPITAL | Age: 57
Discharge: HOME | End: 2022-10-10
Attending: ORTHOPAEDIC SURGERY | Admitting: ORTHOPAEDIC SURGERY
Payer: COMMERCIAL

## 2022-10-10 ENCOUNTER — ANESTHESIA (OUTPATIENT)
Dept: SURGERY | Facility: HOSPITAL | Age: 57
End: 2022-10-10
Payer: COMMERCIAL

## 2022-10-10 DIAGNOSIS — Z01.818 PREOP TESTING: ICD-10-CM

## 2022-10-10 DIAGNOSIS — M70.61 TROCHANTERIC BURSITIS OF RIGHT HIP: ICD-10-CM

## 2022-10-10 PROCEDURE — 71000015 HC POSTOP RECOV 1ST HR: Performed by: ORTHOPAEDIC SURGERY

## 2022-10-10 PROCEDURE — D9220A PRA ANESTHESIA: ICD-10-PCS | Mod: CRNA,,, | Performed by: NURSE ANESTHETIST, CERTIFIED REGISTERED

## 2022-10-10 PROCEDURE — 36000711: Performed by: ORTHOPAEDIC SURGERY

## 2022-10-10 PROCEDURE — 25000003 PHARM REV CODE 250: Performed by: ANESTHESIOLOGY

## 2022-10-10 PROCEDURE — 29881 ARTHRS KNE SRG MNISECTMY M/L: CPT | Mod: RT,,, | Performed by: ORTHOPAEDIC SURGERY

## 2022-10-10 PROCEDURE — 27201423 OPTIME MED/SURG SUP & DEVICES STERILE SUPPLY: Performed by: ORTHOPAEDIC SURGERY

## 2022-10-10 PROCEDURE — 25000003 PHARM REV CODE 250: Performed by: ORTHOPAEDIC SURGERY

## 2022-10-10 PROCEDURE — 71000039 HC RECOVERY, EACH ADD'L HOUR: Performed by: ORTHOPAEDIC SURGERY

## 2022-10-10 PROCEDURE — D9220A PRA ANESTHESIA: ICD-10-PCS | Mod: ANES,,, | Performed by: ANESTHESIOLOGY

## 2022-10-10 PROCEDURE — 36000710: Performed by: ORTHOPAEDIC SURGERY

## 2022-10-10 PROCEDURE — 99900104 DSU ONLY-NO CHARGE-EA ADD'L HR (STAT): Performed by: ORTHOPAEDIC SURGERY

## 2022-10-10 PROCEDURE — 27200651 HC AIRWAY, LMA: Performed by: ANESTHESIOLOGY

## 2022-10-10 PROCEDURE — 25000003 PHARM REV CODE 250: Performed by: NURSE ANESTHETIST, CERTIFIED REGISTERED

## 2022-10-10 PROCEDURE — 37000009 HC ANESTHESIA EA ADD 15 MINS: Performed by: ORTHOPAEDIC SURGERY

## 2022-10-10 PROCEDURE — 29881 PR KNEE SCOPE SINGLE MENISECECTOMY: ICD-10-PCS | Mod: RT,,, | Performed by: ORTHOPAEDIC SURGERY

## 2022-10-10 PROCEDURE — D9220A PRA ANESTHESIA: Mod: ANES,,, | Performed by: ANESTHESIOLOGY

## 2022-10-10 PROCEDURE — 71000033 HC RECOVERY, INTIAL HOUR: Performed by: ORTHOPAEDIC SURGERY

## 2022-10-10 PROCEDURE — D9220A PRA ANESTHESIA: Mod: CRNA,,, | Performed by: NURSE ANESTHETIST, CERTIFIED REGISTERED

## 2022-10-10 PROCEDURE — 63600175 PHARM REV CODE 636 W HCPCS: Performed by: NURSE ANESTHETIST, CERTIFIED REGISTERED

## 2022-10-10 PROCEDURE — 63600175 PHARM REV CODE 636 W HCPCS: Performed by: ORTHOPAEDIC SURGERY

## 2022-10-10 PROCEDURE — 37000008 HC ANESTHESIA 1ST 15 MINUTES: Performed by: ORTHOPAEDIC SURGERY

## 2022-10-10 PROCEDURE — 99900103 DSU ONLY-NO CHARGE-INITIAL HR (STAT): Performed by: ORTHOPAEDIC SURGERY

## 2022-10-10 RX ORDER — LIDOCAINE HCL/PF 100 MG/5ML
SYRINGE (ML) INTRAVENOUS
Status: DISCONTINUED | OUTPATIENT
Start: 2022-10-10 | End: 2022-10-10

## 2022-10-10 RX ORDER — SODIUM CHLORIDE 0.9 % (FLUSH) 0.9 %
10 SYRINGE (ML) INJECTION
Status: DISCONTINUED | OUTPATIENT
Start: 2022-10-10 | End: 2022-10-10 | Stop reason: HOSPADM

## 2022-10-10 RX ORDER — OXYCODONE AND ACETAMINOPHEN 10; 325 MG/1; MG/1
1 TABLET ORAL EVERY 4 HOURS PRN
Qty: 28 TABLET | Refills: 0 | Status: SHIPPED | OUTPATIENT
Start: 2022-10-10 | End: 2023-02-22

## 2022-10-10 RX ORDER — HYDROMORPHONE HYDROCHLORIDE 2 MG/ML
0.2 INJECTION, SOLUTION INTRAMUSCULAR; INTRAVENOUS; SUBCUTANEOUS EVERY 5 MIN PRN
Status: DISCONTINUED | OUTPATIENT
Start: 2022-10-10 | End: 2022-10-10 | Stop reason: HOSPADM

## 2022-10-10 RX ORDER — PHENYLEPHRINE HYDROCHLORIDE 10 MG/ML
INJECTION INTRAVENOUS
Status: DISCONTINUED | OUTPATIENT
Start: 2022-10-10 | End: 2022-10-10

## 2022-10-10 RX ORDER — ONDANSETRON 4 MG/1
8 TABLET, ORALLY DISINTEGRATING ORAL EVERY 8 HOURS PRN
Status: CANCELLED | OUTPATIENT
Start: 2022-10-10

## 2022-10-10 RX ORDER — OXYCODONE HYDROCHLORIDE 5 MG/1
5 TABLET ORAL
Status: DISCONTINUED | OUTPATIENT
Start: 2022-10-10 | End: 2022-10-10 | Stop reason: HOSPADM

## 2022-10-10 RX ORDER — ONDANSETRON 4 MG/1
4 TABLET, ORALLY DISINTEGRATING ORAL EVERY 6 HOURS PRN
Qty: 20 TABLET | Refills: 1 | Status: SHIPPED | OUTPATIENT
Start: 2022-10-10 | End: 2023-04-26

## 2022-10-10 RX ORDER — ONDANSETRON HYDROCHLORIDE 2 MG/ML
INJECTION, SOLUTION INTRAMUSCULAR; INTRAVENOUS
Status: DISCONTINUED | OUTPATIENT
Start: 2022-10-10 | End: 2022-10-10

## 2022-10-10 RX ORDER — LIDOCAINE HYDROCHLORIDE 10 MG/ML
1 INJECTION, SOLUTION EPIDURAL; INFILTRATION; INTRACAUDAL; PERINEURAL ONCE
Status: DISCONTINUED | OUTPATIENT
Start: 2022-10-10 | End: 2022-10-10 | Stop reason: HOSPADM

## 2022-10-10 RX ORDER — CEFAZOLIN SODIUM 2 G/50ML
2 SOLUTION INTRAVENOUS
Status: COMPLETED | OUTPATIENT
Start: 2022-10-10 | End: 2022-10-10

## 2022-10-10 RX ORDER — MIDAZOLAM HYDROCHLORIDE 1 MG/ML
INJECTION INTRAMUSCULAR; INTRAVENOUS
Status: DISCONTINUED | OUTPATIENT
Start: 2022-10-10 | End: 2022-10-10

## 2022-10-10 RX ORDER — DEXAMETHASONE SODIUM PHOSPHATE 4 MG/ML
INJECTION, SOLUTION INTRA-ARTICULAR; INTRALESIONAL; INTRAMUSCULAR; INTRAVENOUS; SOFT TISSUE
Status: DISCONTINUED | OUTPATIENT
Start: 2022-10-10 | End: 2022-10-10

## 2022-10-10 RX ORDER — HYDROCODONE BITARTRATE AND ACETAMINOPHEN 5; 325 MG/1; MG/1
1 TABLET ORAL EVERY 4 HOURS PRN
Status: CANCELLED | OUTPATIENT
Start: 2022-10-10

## 2022-10-10 RX ORDER — ACETAMINOPHEN 10 MG/ML
INJECTION, SOLUTION INTRAVENOUS
Status: DISCONTINUED | OUTPATIENT
Start: 2022-10-10 | End: 2022-10-10

## 2022-10-10 RX ORDER — BUPIVACAINE HYDROCHLORIDE 2.5 MG/ML
INJECTION, SOLUTION EPIDURAL; INFILTRATION; INTRACAUDAL
Status: DISCONTINUED | OUTPATIENT
Start: 2022-10-10 | End: 2022-10-10 | Stop reason: HOSPADM

## 2022-10-10 RX ORDER — FENTANYL CITRATE 50 UG/ML
25 INJECTION, SOLUTION INTRAMUSCULAR; INTRAVENOUS EVERY 5 MIN PRN
Status: DISCONTINUED | OUTPATIENT
Start: 2022-10-10 | End: 2022-10-10 | Stop reason: HOSPADM

## 2022-10-10 RX ORDER — MUPIROCIN 20 MG/G
OINTMENT TOPICAL
Status: DISCONTINUED | OUTPATIENT
Start: 2022-10-10 | End: 2022-10-10 | Stop reason: HOSPADM

## 2022-10-10 RX ORDER — PROPOFOL 10 MG/ML
VIAL (ML) INTRAVENOUS
Status: DISCONTINUED | OUTPATIENT
Start: 2022-10-10 | End: 2022-10-10

## 2022-10-10 RX ORDER — FENTANYL CITRATE 50 UG/ML
INJECTION, SOLUTION INTRAMUSCULAR; INTRAVENOUS
Status: DISCONTINUED | OUTPATIENT
Start: 2022-10-10 | End: 2022-10-10

## 2022-10-10 RX ADMIN — MUPIROCIN: 20 OINTMENT TOPICAL at 07:10

## 2022-10-10 RX ADMIN — FENTANYL CITRATE 50 MCG: 50 INJECTION, SOLUTION INTRAMUSCULAR; INTRAVENOUS at 10:10

## 2022-10-10 RX ADMIN — CEFAZOLIN SODIUM 2 G: 2 SOLUTION INTRAVENOUS at 10:10

## 2022-10-10 RX ADMIN — SODIUM CHLORIDE, SODIUM GLUCONATE, SODIUM ACETATE, POTASSIUM CHLORIDE, MAGNESIUM CHLORIDE, SODIUM PHOSPHATE, DIBASIC, AND POTASSIUM PHOSPHATE: .53; .5; .37; .037; .03; .012; .00082 INJECTION, SOLUTION INTRAVENOUS at 11:10

## 2022-10-10 RX ADMIN — GLYCOPYRROLATE 0.2 MG: 0.2 INJECTION, SOLUTION INTRAMUSCULAR; INTRAVITREAL at 10:10

## 2022-10-10 RX ADMIN — ACETAMINOPHEN 1000 MG: 10 INJECTION, SOLUTION INTRAVENOUS at 10:10

## 2022-10-10 RX ADMIN — PROPOFOL 150 MG: 10 INJECTION, EMULSION INTRAVENOUS at 10:10

## 2022-10-10 RX ADMIN — MIDAZOLAM HYDROCHLORIDE 2 MG: 1 INJECTION, SOLUTION INTRAMUSCULAR; INTRAVENOUS at 10:10

## 2022-10-10 RX ADMIN — SODIUM CHLORIDE, SODIUM GLUCONATE, SODIUM ACETATE, POTASSIUM CHLORIDE, MAGNESIUM CHLORIDE, SODIUM PHOSPHATE, DIBASIC, AND POTASSIUM PHOSPHATE: .53; .5; .37; .037; .03; .012; .00082 INJECTION, SOLUTION INTRAVENOUS at 07:10

## 2022-10-10 RX ADMIN — PHENYLEPHRINE HYDROCHLORIDE 100 MCG: 10 INJECTION INTRAVENOUS at 10:10

## 2022-10-10 RX ADMIN — ONDANSETRON 4 MG: 2 INJECTION, SOLUTION INTRAMUSCULAR; INTRAVENOUS at 10:10

## 2022-10-10 RX ADMIN — DEXAMETHASONE SODIUM PHOSPHATE 4 MG: 4 INJECTION, SOLUTION INTRA-ARTICULAR; INTRALESIONAL; INTRAMUSCULAR; INTRAVENOUS; SOFT TISSUE at 10:10

## 2022-10-10 RX ADMIN — LIDOCAINE HYDROCHLORIDE 75 MG: 20 INJECTION INTRAVENOUS at 10:10

## 2022-10-10 RX ADMIN — OXYCODONE 5 MG: 5 TABLET ORAL at 11:10

## 2022-10-10 NOTE — ANESTHESIA PREPROCEDURE EVALUATION
10/10/2022  David Aguilera is a 56 y.o., male.      Pre-op Assessment    I have reviewed the Patient Summary Reports.     I have reviewed the Nursing Notes. I have reviewed the NPO Status.   I have reviewed the Medications.     Review of Systems  Anesthesia Hx:  Denies Family Hx of Anesthesia complications.   Denies Personal Hx of Anesthesia complications.   Cardiovascular:   Hypertension    Hepatic/GI:   GERD    Endocrine:  Obesity / BMI > 30  Psych:   Psychiatric History          Physical Exam  General: Cooperative, Alert and Oriented    Airway:  Mallampati: II   Mouth Opening: Normal        Anesthesia Plan  Type of Anesthesia, risks & benefits discussed:    Anesthesia Type: Gen Supraglottic Airway  Intra-op Monitoring Plan: Standard ASA Monitors  Post Op Pain Control Plan: multimodal analgesia  Induction:  IV  Airway Plan: , Post-Induction  Informed Consent: Informed consent signed with the Patient and all parties understand the risks and agree with anesthesia plan.  All questions answered.   ASA Score: 2    Ready For Surgery From Anesthesia Perspective.     .

## 2022-10-10 NOTE — DISCHARGE INSTRUCTIONS
"Discharge Instructions: After Your Surgery/Procedure  Youve just had surgery. During surgery you were given medicine called anesthesia to keep you relaxed and free of pain. After surgery you may have some pain or nausea. This is common. Here are some tips for feeling better and getting well after surgery.     Stay on schedule with your medication.   Going home  Your doctor or nurse will show you how to take care of yourself when you go home. He or she will also answer your questions. Have an adult family member or friend drive you home.      For your safety we recommend these precaution for the first 24 hours after your procedure:  Do not drive or use heavy equipment.  Do not make important decisions or sign legal papers.  Do not drink alcohol.  Have someone stay with you, if needed. He or she can watch for problems and help keep you safe.  Your concentration, balance, coordination, and judgement may be impaired for many hours after anesthesia.  Use caution when ambulating or standing up.     You may feel weak and "washed out" after anesthesia and surgery.      Subtle residual effects of general anesthesia or sedation with regional / local anesthesia can last more than 24 hours.  Rest for the remainder of the day or longer if your Doctor/Surgeon has advised you to do so.  Although you may feel normal within the first 24 hours, your reflexes and mental ability may be impaired without you realizing it.  You may feel dizzy, lightheaded or sleepy for 24 hours or longer.      Be sure to go to all follow-up visits with your doctor. And rest after your surgery for as long as your doctor tells you to.  Coping with pain  If you have pain after surgery, pain medicine will help you feel better. Take it as told, before pain becomes severe. Also, ask your doctor or pharmacist about other ways to control pain. This might be with heat, ice, or relaxation. And follow any other instructions your surgeon or nurse gives you.  Tips " for taking pain medicine  To get the best relief possible, remember these points:  Pain medicines can upset your stomach. Taking them with a little food may help.  Most pain relievers taken by mouth need at least 20 to 30 minutes to start to work.  Taking medicine on a schedule can help you remember to take it. Try to time your medicine so that you can take it before starting an activity. This might be before you get dressed, go for a walk, or sit down for dinner.  Constipation is a common side effect of pain medicines. Call your doctor before taking any medicines such as laxatives or stool softeners to help ease constipation. Also ask if you should skip any foods. Drinking lots of fluids and eating foods such as fruits and vegetables that are high in fiber can also help. Remember, do not take laxatives unless your surgeon has prescribed them.  Drinking alcohol and taking pain medicine can cause dizziness and slow your breathing. It can even be deadly. Do not drink alcohol while taking pain medicine.  Pain medicine can make you react more slowly to things. Do not drive or run machinery while taking pain medicine.  Your health care provider may tell you to take acetaminophen to help ease your pain. Ask him or her how much you are supposed to take each day. Acetaminophen or other pain relievers may interact with your prescription medicines or other over-the-counter (OTC) drugs. Some prescription medicines have acetaminophen and other ingredients. Using both prescription and OTC acetaminophen for pain can cause you to overdose. Read the labels on your OTC medicines with care. This will help you to clearly know the list of ingredients, how much to take, and any warnings. It may also help you not take too much acetaminophen. If you have questions or do not understand the information, ask your pharmacist or health care provider to explain it to you before you take the OTC medicine.  Managing nausea  Some people have an  upset stomach after surgery. This is often because of anesthesia, pain, or pain medicine, or the stress of surgery. These tips will help you handle nausea and eat healthy foods as you get better. If you were on a special food plan before surgery, ask your doctor if you should follow it while you get better. These tips may help:  Do not push yourself to eat. Your body will tell you when to eat and how much.  Start off with clear liquids and soup. They are easier to digest.  Next try semi-solid foods, such as mashed potatoes, applesauce, and gelatin, as you feel ready.  Slowly move to solid foods. Dont eat fatty, rich, or spicy foods at first.  Do not force yourself to have 3 large meals a day. Instead eat smaller amounts more often.  Take pain medicines with a small amount of solid food, such as crackers or toast, to avoid nausea.     Call your surgeon if  You still have pain an hour after taking medicine. The medicine may not be strong enough.  You feel too sleepy, dizzy, or groggy. The medicine may be too strong.  You have side effects like nausea, vomiting, or skin changes, such as rash, itching, or hives.       If you have obstructive sleep apnea  You were given anesthesia medicine during surgery to keep you comfortable and free of pain. After surgery, you may have more apnea spells because of this medicine and other medicines you were given. The spells may last longer than usual.   At home:  Keep using the continuous positive airway pressure (CPAP) device when you sleep. Unless your health care provider tells you not to, use it when you sleep, day or night. CPAP is a common device used to treat obstructive sleep apnea.  Talk with your provider before taking any pain medicine, muscle relaxants, or sedatives. Your provider will tell you about the possible dangers of taking these medicines.  © 7066-4277 [employer]. [address] [place] [address], [place] [address]. All rights reserved. This information is  not intended as a substitute for professional medical care. Always follow your healthcare professional's instructions.           Using an Incentive Spirometer    An incentive spirometer is a device that helps you do deep breathing exercises. These exercises expand your lungs, aid in circulation, and help prevent pneumonia. Deep breathing exercises also help you breathe better and improve the function of your lungs by:  Keeping your lungs clear  Strengthening your breathing muscles  Helping prevent respiratory complications or problems  The incentive spirometer gives you a way to take an active part in recover. A nurse or therapist will teach you breathing exercises. To do these exercises, you will breathe in through your mouth and not your nose. The incentive spirometer only works correctly if you breathe in through your mouth.  Steps to clear lungs  Step 1. Exhale normally. Then, inhale normally.  Relax and breathe out.  Step 2. Place your lips tightly around the mouthpiece.  Make sure the device is upright and not tilted.  Step 3. Inhale as much air as you can through the mouthpiece (don't breath through your nose).  Inhale slowly and deeply.  Hold your breath long enough to keep the balls or disk raised for at least 3 to 5 seconds, or as instructed by your healthcare provider.  Some spirometers have an indicator to let you know that you are breathing in too fast. If the indicator goes off, breathe in more slowly.  Step 4. Repeat the exercise regularly.  Do this exercise every hour while you're awake, or as instructed by your healthcare provider.  If you were taught deep breathing and coughing exercises, do them regularly as instructed by your healthcare provider.          Post op instructions for prevention of DVT  What is deep vein thrombosis?  Deep vein thrombosis (DVT) is the medical term for blood clots in the deep veins of the leg.  These blood clots can be dangerous.  A DVT can block a blood vessel and keep  blood from getting where it needs to go.  Another problem is that the clot can travel to other parts of the body such as the lungs.  A clot that travels to the lungs is called a pulmonary embolus (PE) and can cause serious problems with breathing which can lead to death.  Am I at risk for DVT/PE?  If you are not very active, you are at risk of DVT.  Anyone confined to bed, sitting for long periods of time, recovering from surgery, etc. increases the risk of DVT.  Other risk factors are cancer diagnosis, certain medications, estrogen replacement in any form,older age, obesity, pregnancy, smoking, history of clotting disorders, and dehydration.  How will I know if I have a DVT?  Swelling in the lower leg  Pain  Warmth, redness, hardness or bulging of the vein  If you have any of these symptoms, call your doctors office right away.  Some people will not have any symptoms until the clot moves to the lungs.  What are the symptoms of a PE?  Panting, shortness of breath, or trouble breathing  Sharp, knife-like chest pain when you breathe  Coughing or coughing up blood  Rapid heartbeat  If you have any of these symptoms or get worse quickly, call 911 for emergency treatment.  How can I prevent a DVT?  Avoid long periods of inactivity and dont cross your legs--get up and walk around every hour or so.  Stay active--walking after surgery is highly encouraged.  This means you should get out of the house and walk in the neighborhood.  Going up and down stairs will not impair healing (unless advised against such activity by your doctor).    Drink plenty of noncaffeinated, nonalcoholic fluids each day to prevent dehydration.  Wear special support stockings, if they have been advised by your doctor.  If you travel, stop at least once an hour and walk around.  Avoid smoking (assistance with stopping is available through your healthcare provider)  Always notify your doctor if you are not able to follow the post operative  instructions that are given to you at the time of discharge.  It may be necessary to prescribe one of the medications available to prevent DVT.           We hope your stay was comfortable as you heal now, mend and rest.    For we have enjoyed taking care of you by giving your our best.    And as you get better, by regaining your health and strength;   We count it as a privilege to have served you and hope your time at [facility] was well spent.      Thank  You!!!

## 2022-10-10 NOTE — OP NOTE
[facility]  Orthopedic Surgery   Operative Note    SUMMARY     Date of Procedure: 10/10/2022     Procedure: Procedure(s) (LRB):  ARTHROSCOPY, KNEE, WITH MENISCECTOMY (Right)     Posterior horn medial meniscectomy right knee.  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  The right leg was then prepped Hibiclens and ChloraPrep and draped in a sterile manner the limb was exsanguinated and the tourniquet placed to 300 mmHg a lateral joint line incision was made the scope introduced in the knee medial joint line incision was made the probe introduced in the knee joint the suprapatellar area showed no evidence of any synovitis or loose bodies the undersurface of the patella had moderate arthritic changes involving the lateral facet of the patella a synovial resector was placed into the knee and a chondroplasty undersurface of the patella was performed on the lateral facet a thermal probe was then used to sculpt and smooth out the remaining cartilage the trochlear groove  itself looked good and was left alone.  Attention was turned to the medial compartment there was extensive tearing of the posterior horn of the medial meniscus there was some early degenerative changes in the medial compartment for the most part looked good except for some arthritic changes in the lateral portion of the medial femoral condyle a synovial resector and a thermal probe was then used to sculpt and smooth out the articular cartilage on the lateral aspect of the medial femoral condyle.  Basket forceps a synovial resector was used to remove the posterior horn of the medial meniscus the body and intermediate in the intermediate horn and anterior horn appear to be intact.  As mentioned there was some early degenerative changes in the medial compartment a light chondroplasty as mentioned was performed on the lateral aspect of the medial femoral condyle the intercondylar notch showed a large ligamentum mucosa which was excised anterior cruciate was intact.  The lateral compartment was inspected the lateral meniscus appeared to be normal there was no evidence of any tears there was very mild if any arthritic changes in the lateral compartment the lateral compartment was left alone.  The knee was irrigated to remove any loose debris the incisions were closed with 4-0 nylon stitch and 25 cc of 0.25% Marcaine was introduced in the knee joint bulky soft tissue dressing was applied to the knee joint the patient was stable to recovery.

## 2022-10-10 NOTE — PLAN OF CARE
Patient prepared for surgery. Surgical and anesthesia consents signed. Performed incentive spirometer teaching. Belongings in pre-op locker. Text message alerts set up with wife Michelle.

## 2022-10-10 NOTE — TRANSFER OF CARE
Anesthesia Transfer of Care Note    Patient: David ADLER Hingle    Procedure(s) Performed: Procedure(s) (LRB):  ARTHROSCOPY, KNEE, WITH MENISCECTOMY (Right)    Patient location: PACU    Anesthesia Type: general    Transport from OR: Transported from OR on 2-3 L/min O2 by NC with adequate spontaneous ventilation    Post pain: adequate analgesia    Post assessment: no apparent anesthetic complications and tolerated procedure well    Post vital signs: stable    Level of consciousness: responds to stimulation and sedated    Nausea/Vomiting: no nausea/vomiting    Complications: none    Transfer of care protocol was followed      Last vitals:   Visit Vitals  BP (!) 108/53   Pulse 68   Temp 36.8 °C (98.2 °F) (Skin)   Resp 11   Ht 6' (1.829 m)   Wt 108.9 kg (240 lb)   SpO2 98%   BMI 32.55 kg/m²

## 2022-10-10 NOTE — ANESTHESIA PROCEDURE NOTES
Intubation    Date/Time: 10/10/2022 10:09 AM  Performed by: Jeffrey Dallas CRNA  Authorized by: Gerald Carlson MD     Intubation:     Induction:  Intravenous    Intubated:  Postinduction    Attempts:  1    Attempted By:  CRNA    Difficult Airway Encountered?: No      Complications:  None    Airway Device:  Supraglottic airway/LMA    Airway Device Size:  4.0    Style/Cuff Inflation:  Cuffed (inflated to minimal occlusive pressure)    Secured at:  The lips    Placement Verified By:  Capnometry    Complicating Factors:  None

## 2022-10-10 NOTE — ANESTHESIA POSTPROCEDURE EVALUATION
Anesthesia Post Evaluation    Patient: David Mazariegosngle    Procedure(s) Performed: Procedure(s) (LRB):  MENISCECTOMY, KNEE, MEDIAL (Right)  ARTHROSCOPY, KNEE, WITH CHONDROPLASTY (Right)    Final Anesthesia Type: general      Patient location during evaluation: PACU  Patient participation: Yes- Able to Participate  Level of consciousness: awake and alert  Post-procedure vital signs: reviewed and stable  Pain management: adequate  Airway patency: patent    PONV status at discharge: No PONV  Anesthetic complications: no      Cardiovascular status: blood pressure returned to baseline  Respiratory status: unassisted  Hydration status: euvolemic  Follow-up not needed.          Vitals Value Taken Time   /82 10/10/22 1208   Temp 36.8 °C (98.2 °F) 10/10/22 1205   Pulse 54 10/10/22 1208   Resp 12 10/10/22 1208   SpO2 94 % 10/10/22 1208         Event Time   Out of Recovery 12:08:00         Pain/Tremaine Score: Pain Rating Prior to Med Admin: 2 (10/10/2022 11:34 AM)  Pain Rating Post Med Admin: 2 (10/10/2022 12:00 PM)  Tremaine Score: 9 (10/10/2022 12:00 PM)  Modified Tremaine Score: 20 (10/10/2022 12:08 PM)

## 2022-10-10 NOTE — PLAN OF CARE
Discharge instructions given to pt and spouse who voice understanding.  Taking po fluids without nausea.  Denies pain.  Surgical dressing to right knee, dry and intact.  Crutches provided for discharge.  All questions answered, all personal belongings with pt

## 2022-10-10 NOTE — DISCHARGE SUMMARY
[facility]  Discharge Note  Short Stay    Procedure(s) (LRB):  ARTHROSCOPY, KNEE, WITH MENISCECTOMY (Right)      OUTCOME: Patient tolerated treatment/procedure well without complication and is now ready for discharge.    DISPOSITION: Home or Self Care    FINAL DIAGNOSIS:  <principal problem not specified>    FOLLOWUP: In clinic the patient underwent arthroscopy to the right knee he underwent a partial medial meniscectomy and a chondroplasty of the patella postoperatively the patient was placed on crutches full weight-bearing as tolerated he was given Percocet 10 mg q.4 hours as needed for pain and Zofran 4 mg q.6 hours as needed for nausea.  He should keep the dressing on at all times and follow up in our office in 1 week.    DISCHARGE INSTRUCTIONS:    Discharge Procedure Orders   CRUTCHES FOR HOME USE     Order Specific Question Answer Comments   Type: Axillary    Height: 6' (1.829 m)    Weight: 108.9 kg (240 lb)    Length of need (1-99 months): 2      Diet general     Call MD for:  temperature >100.4     Call MD for:  persistent nausea and vomiting     Call MD for:  severe uncontrolled pain     Call MD for:  difficulty breathing, headache or visual disturbances     Call MD for:  redness, tenderness, or signs of infection (pain, swelling, redness, odor or green/yellow discharge around incision site)     Call MD for:  hives     Call MD for:  persistent dizziness or light-headedness     Call MD for:  extreme fatigue     Ice to affected area     Keep surgical extremity elevated     Leave dressing on - Keep it clean, dry, and intact until clinic visit     Weight bearing as tolerated        TIME SPENT ON DISCHARGE:

## 2022-10-11 VITALS
HEART RATE: 57 BPM | TEMPERATURE: 98 F | OXYGEN SATURATION: 95 % | DIASTOLIC BLOOD PRESSURE: 74 MMHG | WEIGHT: 240 LBS | RESPIRATION RATE: 14 BRPM | BODY MASS INDEX: 32.51 KG/M2 | HEIGHT: 72 IN | SYSTOLIC BLOOD PRESSURE: 130 MMHG

## 2022-10-18 ENCOUNTER — OFFICE VISIT (OUTPATIENT)
Dept: ORTHOPEDICS | Facility: CLINIC | Age: 57
End: 2022-10-18
Payer: COMMERCIAL

## 2022-10-18 VITALS — BODY MASS INDEX: 32.51 KG/M2 | HEIGHT: 72 IN | WEIGHT: 240 LBS

## 2022-10-18 DIAGNOSIS — G89.29 CHRONIC PAIN OF LEFT KNEE: ICD-10-CM

## 2022-10-18 DIAGNOSIS — M17.11 PATELLOFEMORAL ARTHRITIS OF RIGHT KNEE: ICD-10-CM

## 2022-10-18 DIAGNOSIS — M25.562 CHRONIC PAIN OF LEFT KNEE: ICD-10-CM

## 2022-10-18 DIAGNOSIS — S83.241A TEAR OF MEDIAL MENISCUS OF RIGHT KNEE, CURRENT, UNSPECIFIED TEAR TYPE, INITIAL ENCOUNTER: Primary | ICD-10-CM

## 2022-10-18 PROCEDURE — 99024 POSTOP FOLLOW-UP VISIT: CPT | Mod: S$GLB,,, | Performed by: ORTHOPAEDIC SURGERY

## 2022-10-18 PROCEDURE — 99024 PR POST-OP FOLLOW-UP VISIT: ICD-10-PCS | Mod: S$GLB,,, | Performed by: ORTHOPAEDIC SURGERY

## 2022-10-18 PROCEDURE — 99999 PR PBB SHADOW E&M-EST. PATIENT-LVL III: ICD-10-PCS | Mod: PBBFAC,,, | Performed by: ORTHOPAEDIC SURGERY

## 2022-10-18 PROCEDURE — 1159F PR MEDICATION LIST DOCUMENTED IN MEDICAL RECORD: ICD-10-PCS | Mod: CPTII,S$GLB,, | Performed by: ORTHOPAEDIC SURGERY

## 2022-10-18 PROCEDURE — 3044F HG A1C LEVEL LT 7.0%: CPT | Mod: CPTII,S$GLB,, | Performed by: ORTHOPAEDIC SURGERY

## 2022-10-18 PROCEDURE — 4010F PR ACE/ARB THEARPY RXD/TAKEN: ICD-10-PCS | Mod: CPTII,S$GLB,, | Performed by: ORTHOPAEDIC SURGERY

## 2022-10-18 PROCEDURE — 1159F MED LIST DOCD IN RCRD: CPT | Mod: CPTII,S$GLB,, | Performed by: ORTHOPAEDIC SURGERY

## 2022-10-18 PROCEDURE — 99999 PR PBB SHADOW E&M-EST. PATIENT-LVL III: CPT | Mod: PBBFAC,,, | Performed by: ORTHOPAEDIC SURGERY

## 2022-10-18 PROCEDURE — 3044F PR MOST RECENT HEMOGLOBIN A1C LEVEL <7.0%: ICD-10-PCS | Mod: CPTII,S$GLB,, | Performed by: ORTHOPAEDIC SURGERY

## 2022-10-18 PROCEDURE — 4010F ACE/ARB THERAPY RXD/TAKEN: CPT | Mod: CPTII,S$GLB,, | Performed by: ORTHOPAEDIC SURGERY

## 2022-10-18 RX ORDER — CYCLOBENZAPRINE HCL 10 MG
10 TABLET ORAL 3 TIMES DAILY PRN
Qty: 30 TABLET | Refills: 1 | Status: SHIPPED | OUTPATIENT
Start: 2022-10-18 | End: 2022-10-28

## 2022-10-18 NOTE — PROGRESS NOTES
10/18/2022    Past Medical History:   Diagnosis Date    ADD (attention deficit disorder with hyperactivity) 4/15/2013    GERD (gastroesophageal reflux disease)     Hyperlipidemia     Hypertension     Hypogonadism in male     Insomnia     Secondary polycythemia        Past Surgical History:   Procedure Laterality Date    ARTHROSCOPIC CHONDROPLASTY OF KNEE JOINT Right 10/10/2022    Procedure: ARTHROSCOPY, KNEE, WITH CHONDROPLASTY;  Surgeon: Herbie Hoyt MD;  Location: [facility] OR;  Service: Orthopedics;  Laterality: Right;    COLONOSCOPY N/A 11/6/2018    Dr. Chadwick; unremarkable; repeat in 10 years    EXCISION OF MEDIAL MENISCUS OF KNEE Right 10/10/2022    Procedure: MENISCECTOMY, KNEE, MEDIAL;  Surgeon: Herbie Hoyt MD;  Location: [facility] OR;  Service: Orthopedics;  Laterality: Right;    HERNIA REPAIR  2000    L knee scope      L shoulder surgery      VASECTOMY         Current Outpatient Medications   Medication Sig    AMBIEN 10 mg Tab Take 10 mg by mouth every evening.    dextroamphetamine-amphetamine (AMPHETAMINE SALT COMBO) 30 mg Tab Take 1 tablet by mouth 3 (three) times daily.     ondansetron (ZOFRAN-ODT) 4 MG TbDL Take 1 tablet (4 mg total) by mouth every 6 (six) hours as needed.    oxyCODONE-acetaminophen (PERCOCET)  mg per tablet Take 1 tablet by mouth every 4 (four) hours as needed for Pain.    telmisartan-hydrochlorothiazide (MICARDIS HCT) 40-12.5 mg per tablet Take 1 tablet by mouth once daily.    testosterone cypionate (DEPOTESTOTERONE CYPIONATE) 200 mg/mL injection INJECT 1 ML (200MG) INTRAMUSCULARLY EVERY 14 DAYS     No current facility-administered medications for this visit.       Review of patient's allergies indicates:  No Known Allergies    Family History   Problem Relation Age of Onset    Stroke Mother 76    Colon cancer Father         ?    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 Pt reports 4/10 pain to his right knee today. Pt requesting an RX Tylenol 3 for pain relief, which helps. Chief concern today is welling swelling in right lower leg.          History of present illness:    This is a 56 y.o. year old male who complains of patient is now 8 days status post arthroscopy to the right knee patient reports 4/10 pain complaining of some swelling in the lower leg    Review of Systems:    Constitution: Denies chills, fever, and sweats.  HENT: Denies headaches or blurry vision.  Cardiovascular: Denies chest pain or irregular heart beat.  Respiratory: Denies cough or shortness of breath.  Gastrointestinal: Denies abdominal pain, nausea, or vomiting.  Musculoskeletal:  Denies muscle cramps.  Neurological: Denies dizziness or focal weakness.  Psychiatric/Behavioral: Normal mental status.  Hematologic/Lymphatic: Denies bleeding problem or easy bruising/bleeding.  Skin: Denies rash or suspicious lesions.    Examination:    Vital Signs:    Vitals:    10/18/22 1356   Weight: 108.9 kg (240 lb)   Height: 6' (1.829 m)   PainSc:   4   PainLoc: Knee       Body mass index is 32.55 kg/m².    This a well-developed, well nourished patient in no acute distress.    Alert and oriented x 3 and cooperative to examination.       Physical Exam:  Right knee-his incisions healing well is no drainage he has a slight effusion he has good range of motion some weakness in " his quad negative Homans sign on the right side        Left knee-patient has a mild effusion in the left knee has a positive Erick sign with medial joint line pain    Imaging:  No x-rays       Assessment: Tear of medial meniscus of right knee, current, unspecified tear type, initial encounter    Patellofemoral arthritis of right knee    Chronic pain of left knee      Plan:  Patient has been instructed on straight leg raises and quadriceps exercises.  Patient continues to complain of some left knee pain I will go ahead get an MRI of his left knee      DISCLAIMER: This note may have been dictated using voice recognition software and may contain grammatical errors.     NOTE: Consult report sent to referring provider via [employer] EMR.

## 2022-10-25 ENCOUNTER — OFFICE VISIT (OUTPATIENT)
Dept: ORTHOPEDICS | Facility: CLINIC | Age: 57
End: 2022-10-25
Payer: COMMERCIAL

## 2022-10-25 VITALS — WEIGHT: 240 LBS | HEIGHT: 72 IN | BODY MASS INDEX: 32.51 KG/M2

## 2022-10-25 DIAGNOSIS — S83.241A TEAR OF MEDIAL MENISCUS OF RIGHT KNEE, CURRENT, UNSPECIFIED TEAR TYPE, INITIAL ENCOUNTER: Primary | ICD-10-CM

## 2022-10-25 DIAGNOSIS — M17.11 PATELLOFEMORAL ARTHRITIS OF RIGHT KNEE: ICD-10-CM

## 2022-10-25 PROCEDURE — 99024 POSTOP FOLLOW-UP VISIT: CPT | Mod: S$GLB,,, | Performed by: ORTHOPAEDIC SURGERY

## 2022-10-25 PROCEDURE — 3044F PR MOST RECENT HEMOGLOBIN A1C LEVEL <7.0%: ICD-10-PCS | Mod: CPTII,S$GLB,, | Performed by: ORTHOPAEDIC SURGERY

## 2022-10-25 PROCEDURE — 99024 PR POST-OP FOLLOW-UP VISIT: ICD-10-PCS | Mod: S$GLB,,, | Performed by: ORTHOPAEDIC SURGERY

## 2022-10-25 PROCEDURE — 3044F HG A1C LEVEL LT 7.0%: CPT | Mod: CPTII,S$GLB,, | Performed by: ORTHOPAEDIC SURGERY

## 2022-10-25 PROCEDURE — 99999 PR PBB SHADOW E&M-EST. PATIENT-LVL III: CPT | Mod: PBBFAC,,, | Performed by: ORTHOPAEDIC SURGERY

## 2022-10-25 PROCEDURE — 1159F MED LIST DOCD IN RCRD: CPT | Mod: CPTII,S$GLB,, | Performed by: ORTHOPAEDIC SURGERY

## 2022-10-25 PROCEDURE — 99999 PR PBB SHADOW E&M-EST. PATIENT-LVL III: ICD-10-PCS | Mod: PBBFAC,,, | Performed by: ORTHOPAEDIC SURGERY

## 2022-10-25 PROCEDURE — 4010F PR ACE/ARB THEARPY RXD/TAKEN: ICD-10-PCS | Mod: CPTII,S$GLB,, | Performed by: ORTHOPAEDIC SURGERY

## 2022-10-25 PROCEDURE — 1159F PR MEDICATION LIST DOCUMENTED IN MEDICAL RECORD: ICD-10-PCS | Mod: CPTII,S$GLB,, | Performed by: ORTHOPAEDIC SURGERY

## 2022-10-25 PROCEDURE — 4010F ACE/ARB THERAPY RXD/TAKEN: CPT | Mod: CPTII,S$GLB,, | Performed by: ORTHOPAEDIC SURGERY

## 2022-10-25 NOTE — PROGRESS NOTES
10/25/2022    Past Medical History:   Diagnosis Date    ADD (attention deficit disorder with hyperactivity) 4/15/2013    GERD (gastroesophageal reflux disease)     Hyperlipidemia     Hypertension     Hypogonadism in male     Insomnia     Secondary polycythemia        Past Surgical History:   Procedure Laterality Date    ARTHROSCOPIC CHONDROPLASTY OF KNEE JOINT Right 10/10/2022    Procedure: ARTHROSCOPY, KNEE, WITH CHONDROPLASTY;  Surgeon: Herbie Hoyt MD;  Location: [facility] OR;  Service: Orthopedics;  Laterality: Right;    COLONOSCOPY N/A 11/6/2018    Dr. Chadwick; unremarkable; repeat in 10 years    EXCISION OF MEDIAL MENISCUS OF KNEE Right 10/10/2022    Procedure: MENISCECTOMY, KNEE, MEDIAL;  Surgeon: Herbie Hoyt MD;  Location: [facility] OR;  Service: Orthopedics;  Laterality: Right;    HERNIA REPAIR  2000    L knee scope      L shoulder surgery      VASECTOMY         Current Outpatient Medications   Medication Sig    AMBIEN 10 mg Tab Take 10 mg by mouth every evening.    cyclobenzaprine (FLEXERIL) 10 MG tablet Take 1 tablet (10 mg total) by mouth 3 (three) times daily as needed for Muscle spasms.    dextroamphetamine-amphetamine (AMPHETAMINE SALT COMBO) 30 mg Tab Take 1 tablet by mouth 3 (three) times daily.     ondansetron (ZOFRAN-ODT) 4 MG TbDL Take 1 tablet (4 mg total) by mouth every 6 (six) hours as needed.    oxyCODONE-acetaminophen (PERCOCET)  mg per tablet Take 1 tablet by mouth every 4 (four) hours as needed for Pain.    telmisartan-hydrochlorothiazide (MICARDIS HCT) 40-12.5 mg per tablet Take 1 tablet by mouth once daily.    testosterone cypionate (DEPOTESTOTERONE CYPIONATE) 200 mg/mL injection INJECT 1 ML (200MG) INTRAMUSCULARLY EVERY 14 DAYS     No current facility-administered medications for this visit.       Review of patient's allergies indicates:  No Known Allergies    Family History   Problem Relation Age of Onset    Stroke Mother 76    Colon cancer Father         ?    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         History of present illness:    This is a 56 y.o. year old male who complains of patient is 15 days status post arthroscopy to the right knee he underwent a partial medial meniscectomy and chondroplasty of the patella.  The patient reports a lot of improvement since his arthroscopy to the right knee    Review of Systems:    Constitution: Denies chills, fever, and sweats.  HENT: Denies headaches or blurry vision.  Cardiovascular: Denies chest pain or irregular heart beat.  Respiratory: Denies cough or shortness of breath.  Gastrointestinal: Denies abdominal pain, nausea, or vomiting.  Musculoskeletal:  Denies muscle cramps.  Neurological: Denies dizziness or focal weakness.  Psychiatric/Behavioral: Normal mental status.  Hematologic/Lymphatic: Denies bleeding problem or easy bruising/bleeding.  Skin: Denies rash or suspicious lesions.    Examination:    Vital Signs:    Vitals:    10/25/22 1525   Weight: 108.9 kg (240 lb)   Height: 6' (1.829 m)   PainSc:   1   PainLoc: Knee       Body mass index is 32.55 kg/m².    This a well-developed, well nourished patient in no acute distress.    Alert and oriented x 3 and cooperative to examination.       Physical Exam:  Right knee-incisions healed well slight effusion increased tone in his quadriceps " good range of motion    Imaging:  No x-rays       Assessment: Tear of medial meniscus of right knee, current, unspecified tear type, initial encounter    Patellofemoral arthritis of right knee      Plan:  Patient has been encouraged to continue straight leg raises and quadriceps exercises avoid deep squats to the right knee increase his activity as tolerated return as needed      DISCLAIMER: This note may have been dictated using voice recognition software and may contain grammatical errors.     NOTE: Consult report sent to referring provider via [employer] EMR.

## 2023-02-22 ENCOUNTER — OFFICE VISIT (OUTPATIENT)
Dept: FAMILY MEDICINE | Facility: CLINIC | Age: 58
End: 2023-02-22
Payer: COMMERCIAL

## 2023-02-22 VITALS
HEIGHT: 72 IN | OXYGEN SATURATION: 96 % | HEART RATE: 84 BPM | DIASTOLIC BLOOD PRESSURE: 82 MMHG | WEIGHT: 257.06 LBS | SYSTOLIC BLOOD PRESSURE: 116 MMHG | BODY MASS INDEX: 34.82 KG/M2

## 2023-02-22 DIAGNOSIS — F31.81 BIPOLAR 2 DISORDER: Primary | ICD-10-CM

## 2023-02-22 PROCEDURE — 3074F SYST BP LT 130 MM HG: CPT | Mod: CPTII,S$GLB,,

## 2023-02-22 PROCEDURE — 1159F MED LIST DOCD IN RCRD: CPT | Mod: CPTII,S$GLB,,

## 2023-02-22 PROCEDURE — 1159F PR MEDICATION LIST DOCUMENTED IN MEDICAL RECORD: ICD-10-PCS | Mod: CPTII,S$GLB,,

## 2023-02-22 PROCEDURE — 99999 PR PBB SHADOW E&M-EST. PATIENT-LVL III: CPT | Mod: PBBFAC,,,

## 2023-02-22 PROCEDURE — 1160F RVW MEDS BY RX/DR IN RCRD: CPT | Mod: CPTII,S$GLB,,

## 2023-02-22 PROCEDURE — 1160F PR REVIEW ALL MEDS BY PRESCRIBER/CLIN PHARMACIST DOCUMENTED: ICD-10-PCS | Mod: CPTII,S$GLB,,

## 2023-02-22 PROCEDURE — 3074F PR MOST RECENT SYSTOLIC BLOOD PRESSURE < 130 MM HG: ICD-10-PCS | Mod: CPTII,S$GLB,,

## 2023-02-22 PROCEDURE — 3079F DIAST BP 80-89 MM HG: CPT | Mod: CPTII,S$GLB,,

## 2023-02-22 PROCEDURE — 99213 PR OFFICE/OUTPT VISIT, EST, LEVL III, 20-29 MIN: ICD-10-PCS | Mod: S$GLB,,,

## 2023-02-22 PROCEDURE — 3008F BODY MASS INDEX DOCD: CPT | Mod: CPTII,S$GLB,,

## 2023-02-22 PROCEDURE — 99999 PR PBB SHADOW E&M-EST. PATIENT-LVL III: ICD-10-PCS | Mod: PBBFAC,,,

## 2023-02-22 PROCEDURE — 99213 OFFICE O/P EST LOW 20 MIN: CPT | Mod: S$GLB,,,

## 2023-02-22 PROCEDURE — 3008F PR BODY MASS INDEX (BMI) DOCUMENTED: ICD-10-PCS | Mod: CPTII,S$GLB,,

## 2023-02-22 PROCEDURE — 3079F PR MOST RECENT DIASTOLIC BLOOD PRESSURE 80-89 MM HG: ICD-10-PCS | Mod: CPTII,S$GLB,,

## 2023-02-22 NOTE — PROGRESS NOTES
"[facility]  [address]  [place] [address]    Subjective    Chief Complaint:   Chief Complaint   Patient presents with    Annual Exam     Medication changes, wants to talk a out a few things       History of Present Illness:     David Aguilera is a(n) 57 y.o. male with past medical history as noted below who presents to the clinic today for follow up. He is present with his wife.     Dysphoric mood:  Reports emotional lability, for his whole life worse lately. Has been seeing a therapist.   +sadness, +lack of interest, no changes in energy levels. +appetite, +passive SI without a plan when in an episode of rage. Denies substance use.   His wife reports excessive focus/ fixation on things.    Reports emotions are episodic, sometimes feels "down". Denies recent episodes of excessive spending or gambling. Does have episodes of intensity/anger/elated mood.     Has been off testosterone injections, has appointment with urology Monday. Az Castro MD. Gets labs via [facility].       ADD , Insomnia  Prescriber- Wagner Arguello (? Behavioral specialist)   Rx-Adderall 30 mg 3 times daily, Ambien 10 mg   Reports taking Ambien sparingly      Hypogonadism  rx : depot T :  200 mg every 14 days  Prescriber-Matthew   Has a secondary polycythemia: having therapeutic phlebotomy   Has hematology following   Has been hold T to get RBC lower   PSA levels monitored      Dyslipidemia  Diet controlled  Lipids at goal     Hypertension  No current meds  Not checking at home      GERD  Rx-Pepcid taken rarely      Asthma  Rx-albuterol as needed, Singulair     Family history Colon Cancer : Father   UTD on colon cancer screening         Chief Complaint   Patient presents with    Annual Exam     Medication changes, wants to talk a out a few things         Problem List:   Patient Active Problem List   Diagnosis    ADD (attention deficit disorder with hyperactivity)    Non morbid obesity due to excess " calories    Pure hypercholesterolemia    Colon cancer screening    Testicular hypofunction    Polycythemia    Hypertension    Insomnia       Current Outpatient Medications:   Current Outpatient Medications   Medication Instructions    AMBIEN 10 mg, Oral, Nightly    dextroamphetamine-amphetamine (AMPHETAMINE SALT COMBO) 30 mg Tab 1 tablet, Oral, 3 times daily    ondansetron (ZOFRAN-ODT) 4 mg, Oral, Every 6 hours PRN    oxyCODONE-acetaminophen (PERCOCET)  mg per tablet 1 tablet, Oral, Every 4 hours PRN    telmisartan-hydrochlorothiazide (MICARDIS HCT) 40-12.5 mg per tablet 1 tablet, Oral, Daily    testosterone cypionate (DEPOTESTOTERONE CYPIONATE) 200 mg/mL injection INJECT 1 ML (200MG) INTRAMUSCULARLY EVERY 14 DAYS       Surgical History:   Past Surgical History:   Procedure Laterality Date    ARTHROSCOPIC CHONDROPLASTY OF KNEE JOINT Right 10/10/2022    Procedure: ARTHROSCOPY, KNEE, WITH CHONDROPLASTY;  Surgeon: Herbie Hoyt MD;  Location: [facility] OR;  Service: Orthopedics;  Laterality: Right;    COLONOSCOPY N/A 11/6/2018    Dr. Chadwick; unremarkable; repeat in 10 years    EXCISION OF MEDIAL MENISCUS OF KNEE Right 10/10/2022    Procedure: MENISCECTOMY, KNEE, MEDIAL;  Surgeon: Herbie Hoyt MD;  Location: [facility] OR;  Service: Orthopedics;  Laterality: Right;    HERNIA REPAIR  2000    L knee scope      L shoulder surgery      VASECTOMY         Family History:   Family History   Problem Relation Age of Onset    Stroke Mother 76    Colon cancer Father         ?    Stomach cancer Neg Hx     Esophageal cancer Neg Hx     Crohn's disease Neg Hx     Ulcerative colitis Neg Hx        Allergies: Review of patient's allergies indicates:  No Known Allergies    Tobacco Status:   Tobacco Use: Low Risk     Smoking Tobacco Use: Never    Smokeless Tobacco Use: Never    Passive Exposure: Not on file       Sexual Activity:   Social History     Substance and Sexual Activity   Sexual Activity Yes    Partners: Female        Alcohol Use:   Social History     Substance and Sexual Activity   Alcohol Use Never         Objective       Vitals:    02/22/23 1519   BP: 116/82   Pulse: 84   SpO2: 96%   Weight: 116.6 kg (257 lb 0.9 oz)   Height: 6' (1.829 m)       Review of Systems   HENT:  Negative for hearing loss.    Eyes:  Negative for discharge.   Respiratory:  Negative for wheezing.    Cardiovascular:  Negative for chest pain and palpitations.   Gastrointestinal:  Negative for blood in stool, constipation, diarrhea and vomiting.   Genitourinary:  Negative for hematuria and urgency.   Musculoskeletal:  Negative for neck pain.   Neurological:  Negative for weakness and headaches.   Endo/Heme/Allergies:  Negative for polydipsia.   Psychiatric/Behavioral:  Positive for depression.         +passive SI/HI     Physical Exam      Assessment and Plan:    1. Bipolar 2 disorder  -     Ambulatory referral/consult to Psychiatry; Future; Expected date: 03/01/2023        Visit summary:    David Aguilera presented today for emotional lability consistent with bipolar 2 disorder.     Patient taking lifelong ADD meds and Ambien as needed for insomnia. Patient needs psychiatric evaluation prior to antipsychotic meds given these outpatient meds. Given strict ER precautions with any feelings of suicidality or homicidality as well as paulie: impulsivity, elation, emotional outbursts, psychosis. Patient has counselor available if needed.     Patient was instructed to report to ER if symptoms become severe.    Follow up: No follow-ups on file.      Vicki Pavon PA-C

## 2023-02-23 ENCOUNTER — PATIENT MESSAGE (OUTPATIENT)
Dept: PSYCHIATRY | Facility: CLINIC | Age: 58
End: 2023-02-23
Payer: COMMERCIAL

## 2023-03-17 DIAGNOSIS — I10 HYPERTENSION, UNSPECIFIED TYPE: ICD-10-CM

## 2023-03-17 RX ORDER — TELMISARTAN 20 MG/1
TABLET ORAL
Qty: 30 TABLET | Refills: 3 | OUTPATIENT
Start: 2023-03-17

## 2023-03-17 NOTE — TELEPHONE ENCOUNTER
Refill Decision Note   David Aguilera  is requesting a refill authorization.  Brief Assessment and Rationale for Refill:  Quick Discontinue     Medication Therapy Plan:  Telmisartan changed to telmisartan-hctz (9/29/22); Quick dc    Medication Reconciliation Completed: No   Comments:     No Care Gaps recommended.     Note composed:5:37 PM 03/17/2023

## 2023-03-17 NOTE — TELEPHONE ENCOUNTER
No new care gaps identified.  [facility] Embedded Care Gaps. Reference number: [id number]. 3/17/2023   2:52:06 PM CDT

## 2023-04-26 ENCOUNTER — OFFICE VISIT (OUTPATIENT)
Dept: PSYCHIATRY | Facility: CLINIC | Age: 58
End: 2023-04-26
Payer: COMMERCIAL

## 2023-04-26 VITALS
DIASTOLIC BLOOD PRESSURE: 74 MMHG | HEIGHT: 72 IN | WEIGHT: 260.69 LBS | HEART RATE: 81 BPM | BODY MASS INDEX: 35.31 KG/M2 | SYSTOLIC BLOOD PRESSURE: 126 MMHG

## 2023-04-26 DIAGNOSIS — F31.81 BIPOLAR 2 DISORDER: ICD-10-CM

## 2023-04-26 PROCEDURE — 90792 PSYCH DIAG EVAL W/MED SRVCS: CPT | Mod: S$GLB,,, | Performed by: PHYSICIAN ASSISTANT

## 2023-04-26 PROCEDURE — 1160F PR REVIEW ALL MEDS BY PRESCRIBER/CLIN PHARMACIST DOCUMENTED: ICD-10-PCS | Mod: CPTII,S$GLB,, | Performed by: PHYSICIAN ASSISTANT

## 2023-04-26 PROCEDURE — 3078F PR MOST RECENT DIASTOLIC BLOOD PRESSURE < 80 MM HG: ICD-10-PCS | Mod: CPTII,S$GLB,, | Performed by: PHYSICIAN ASSISTANT

## 2023-04-26 PROCEDURE — 3008F PR BODY MASS INDEX (BMI) DOCUMENTED: ICD-10-PCS | Mod: CPTII,S$GLB,, | Performed by: PHYSICIAN ASSISTANT

## 2023-04-26 PROCEDURE — 99999 PR PBB SHADOW E&M-EST. PATIENT-LVL III: ICD-10-PCS | Mod: PBBFAC,,, | Performed by: PHYSICIAN ASSISTANT

## 2023-04-26 PROCEDURE — 90792 PR PSYCHIATRIC DIAGNOSTIC EVALUATION W/MEDICAL SERVICES: ICD-10-PCS | Mod: S$GLB,,, | Performed by: PHYSICIAN ASSISTANT

## 2023-04-26 PROCEDURE — 99999 PR PBB SHADOW E&M-EST. PATIENT-LVL III: CPT | Mod: PBBFAC,,, | Performed by: PHYSICIAN ASSISTANT

## 2023-04-26 PROCEDURE — 1160F RVW MEDS BY RX/DR IN RCRD: CPT | Mod: CPTII,S$GLB,, | Performed by: PHYSICIAN ASSISTANT

## 2023-04-26 PROCEDURE — 3074F PR MOST RECENT SYSTOLIC BLOOD PRESSURE < 130 MM HG: ICD-10-PCS | Mod: CPTII,S$GLB,, | Performed by: PHYSICIAN ASSISTANT

## 2023-04-26 PROCEDURE — 3078F DIAST BP <80 MM HG: CPT | Mod: CPTII,S$GLB,, | Performed by: PHYSICIAN ASSISTANT

## 2023-04-26 PROCEDURE — 1159F MED LIST DOCD IN RCRD: CPT | Mod: CPTII,S$GLB,, | Performed by: PHYSICIAN ASSISTANT

## 2023-04-26 PROCEDURE — 3008F BODY MASS INDEX DOCD: CPT | Mod: CPTII,S$GLB,, | Performed by: PHYSICIAN ASSISTANT

## 2023-04-26 PROCEDURE — 1159F PR MEDICATION LIST DOCUMENTED IN MEDICAL RECORD: ICD-10-PCS | Mod: CPTII,S$GLB,, | Performed by: PHYSICIAN ASSISTANT

## 2023-04-26 PROCEDURE — 3074F SYST BP LT 130 MM HG: CPT | Mod: CPTII,S$GLB,, | Performed by: PHYSICIAN ASSISTANT
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
## 2023-04-26 NOTE — PATIENT INSTRUCTIONS
Will plan follow up in one month to complete evaluation.    Please go to emergency department if feeling as though you are a harm to yourself or others or if you are in crisis. Please call the clinic to report any worsening of symptoms or problems associated with medication.    Continue current medications as prescribed.

## 2023-04-27 ENCOUNTER — TELEPHONE (OUTPATIENT)
Dept: PSYCHIATRY | Facility: CLINIC | Age: 58
End: 2023-04-27
Payer: COMMERCIAL

## 2023-05-08 NOTE — TELEPHONE ENCOUNTER
Spoke with Tamica on the phone. She has some difference in opinions regarding her . She is not pleased with having to wait until recent for David to be seen by psychiatry department. I offered provider and patient advocacy number which she declined at this time but states she may call back later. I discussed with her that patient is also seeing Dr. Arguello - generally we wouldn't both be prescribing medications. She states that is only for ADHD. She expresses behavioral concerns regarding David and I discussed with her [facility]/911 precautions. I asked her how I could be of more benefit and she again discusses primary care visit which I let her know I was not present nor had any input on a visit prior to myself meeting David. I offered her to join David for an appointment if he chooses. Will have him come in sooner for an appointment. He agreed to Thursday at 1:00pm.

## 2023-05-11 ENCOUNTER — PATIENT MESSAGE (OUTPATIENT)
Dept: PSYCHIATRY | Facility: CLINIC | Age: 58
End: 2023-05-11
Payer: COMMERCIAL

## 2023-07-31 ENCOUNTER — TELEPHONE (OUTPATIENT)
Dept: FAMILY MEDICINE | Facility: CLINIC | Age: 58
End: 2023-07-31

## 2023-07-31 ENCOUNTER — OFFICE VISIT (OUTPATIENT)
Dept: FAMILY MEDICINE | Facility: CLINIC | Age: 58
End: 2023-07-31
Payer: COMMERCIAL

## 2023-07-31 VITALS
BODY MASS INDEX: 33.71 KG/M2 | DIASTOLIC BLOOD PRESSURE: 86 MMHG | SYSTOLIC BLOOD PRESSURE: 132 MMHG | TEMPERATURE: 98 F | RESPIRATION RATE: 16 BRPM | HEIGHT: 72 IN | WEIGHT: 248.88 LBS | HEART RATE: 83 BPM | OXYGEN SATURATION: 98 %

## 2023-07-31 DIAGNOSIS — Z00.00 WELLNESS EXAMINATION: Primary | ICD-10-CM

## 2023-07-31 DIAGNOSIS — B35.6 TINEA CRURIS: ICD-10-CM

## 2023-07-31 DIAGNOSIS — L02.92 FURUNCLE: ICD-10-CM

## 2023-07-31 DIAGNOSIS — L03.317 CELLULITIS OF BUTTOCK: ICD-10-CM

## 2023-07-31 PROCEDURE — 87186 SC STD MICRODIL/AGAR DIL: CPT | Performed by: FAMILY MEDICINE

## 2023-07-31 PROCEDURE — 3075F PR MOST RECENT SYSTOLIC BLOOD PRESS GE 130-139MM HG: ICD-10-PCS | Mod: CPTII,S$GLB,, | Performed by: FAMILY MEDICINE

## 2023-07-31 PROCEDURE — 87077 CULTURE AEROBIC IDENTIFY: CPT | Performed by: FAMILY MEDICINE

## 2023-07-31 PROCEDURE — 3008F PR BODY MASS INDEX (BMI) DOCUMENTED: ICD-10-PCS | Mod: CPTII,S$GLB,, | Performed by: FAMILY MEDICINE

## 2023-07-31 PROCEDURE — 87070 CULTURE OTHR SPECIMN AEROBIC: CPT | Performed by: FAMILY MEDICINE

## 2023-07-31 PROCEDURE — 99214 OFFICE O/P EST MOD 30 MIN: CPT | Mod: 25,S$GLB,, | Performed by: FAMILY MEDICINE

## 2023-07-31 PROCEDURE — 1159F PR MEDICATION LIST DOCUMENTED IN MEDICAL RECORD: ICD-10-PCS | Mod: CPTII,S$GLB,, | Performed by: FAMILY MEDICINE

## 2023-07-31 PROCEDURE — 1159F MED LIST DOCD IN RCRD: CPT | Mod: CPTII,S$GLB,, | Performed by: FAMILY MEDICINE

## 2023-07-31 PROCEDURE — 3079F DIAST BP 80-89 MM HG: CPT | Mod: CPTII,S$GLB,, | Performed by: FAMILY MEDICINE

## 2023-07-31 PROCEDURE — 99999 PR PBB SHADOW E&M-EST. PATIENT-LVL IV: CPT | Mod: PBBFAC,,, | Performed by: FAMILY MEDICINE

## 2023-07-31 PROCEDURE — 99214 PR OFFICE/OUTPT VISIT, EST, LEVL IV, 30-39 MIN: ICD-10-PCS | Mod: 25,S$GLB,, | Performed by: FAMILY MEDICINE

## 2023-07-31 PROCEDURE — 3075F SYST BP GE 130 - 139MM HG: CPT | Mod: CPTII,S$GLB,, | Performed by: FAMILY MEDICINE

## 2023-07-31 PROCEDURE — 99999 PR PBB SHADOW E&M-EST. PATIENT-LVL IV: ICD-10-PCS | Mod: PBBFAC,,, | Performed by: FAMILY MEDICINE

## 2023-07-31 PROCEDURE — 96372 THER/PROPH/DIAG INJ SC/IM: CPT | Mod: S$GLB,,, | Performed by: FAMILY MEDICINE

## 2023-07-31 PROCEDURE — 3008F BODY MASS INDEX DOCD: CPT | Mod: CPTII,S$GLB,, | Performed by: FAMILY MEDICINE

## 2023-07-31 PROCEDURE — 3079F PR MOST RECENT DIASTOLIC BLOOD PRESSURE 80-89 MM HG: ICD-10-PCS | Mod: CPTII,S$GLB,, | Performed by: FAMILY MEDICINE

## 2023-07-31 PROCEDURE — 96372 PR INJECTION,THERAP/PROPH/DIAG2ST, IM OR SUBCUT: ICD-10-PCS | Mod: S$GLB,,, | Performed by: FAMILY MEDICINE

## 2023-07-31 RX ORDER — KETOCONAZOLE 20 MG/G
CREAM TOPICAL 2 TIMES DAILY
Qty: 60 G | Refills: 2 | Status: SHIPPED | OUTPATIENT
Start: 2023-07-31 | End: 2023-11-13

## 2023-07-31 RX ORDER — CEFTRIAXONE 1 G/1
1 INJECTION, POWDER, FOR SOLUTION INTRAMUSCULAR; INTRAVENOUS
Status: COMPLETED | OUTPATIENT
Start: 2023-07-31 | End: 2023-07-31

## 2023-07-31 RX ORDER — SULFAMETHOXAZOLE AND TRIMETHOPRIM 800; 160 MG/1; MG/1
1 TABLET ORAL 2 TIMES DAILY
Qty: 10 TABLET | Refills: 0 | Status: SHIPPED | OUTPATIENT
Start: 2023-07-31 | End: 2023-08-03 | Stop reason: SDUPTHER

## 2023-07-31 RX ADMIN — CEFTRIAXONE 1 G: 1 INJECTION, POWDER, FOR SOLUTION INTRAMUSCULAR; INTRAVENOUS at 11:07

## 2023-07-31 NOTE — PROGRESS NOTES
THIS DOCUMENT WAS MADE IN PART WITH VOICE RECOGNITION SOFTWARE.  OCCASIONALLY THIS SOFTWARE WILL MISINTERPRET WORDS OR PHRASES.    Assessment and Plan:    1. Wellness examination  Comprehensive Metabolic Panel    CBC Auto Differential    Lipid Panel    Hemoglobin A1C    PSA, Screening    Urinalysis, Reflex to Urine Culture Urine, Clean Catch    Urinalysis Microscopic      2. Cellulitis of buttock  cefTRIAXone injection 1 g    sulfamethoxazole-trimethoprim 800-160mg (BACTRIM DS) 800-160 mg Tab    CULTURE, AEROBIC  (SPECIFY SOURCE)    Ambulatory referral/consult to General Surgery      3. Furuncle  cefTRIAXone injection 1 g    sulfamethoxazole-trimethoprim 800-160mg (BACTRIM DS) 800-160 mg Tab    CULTURE, AEROBIC  (SPECIFY SOURCE)    Ambulatory referral/consult to General Surgery      4. Tinea cruris  ketoconazole (NIZORAL) 2 % cream        F/u gen surg / with me this week  ED precautions given   New med : nizoral for tinea  New med bactrim for cellulitis   Recommend sitz bathes   Wound culture      ______________________________________________________________________  Subjective:    Chief Complaint:  Chief Complaint   Patient presents with    Recurrent Skin Infections     Top of buttock crack / getting bigger / draining pus         HPI:  David is a 57 y.o. year old     Complains of pain  / bump with discharge rash within gluteal cleft  Duration : several days  No systemic symptoms   Pt expressed purulence 2 days ago         ADD , Insomnia  Prescriber- Wagner Arguello (? Behavioral specialist)   Rx-Adderall 30 mg 3 times daily, Ambien 10 mg  Reports taking Ambien sparingly      Hypogonadism  rx : depot T :  200 mg every 14 days  Prescriber-Matthew ---> Tootie Benítez   Has a secondary polycythemia: having therapeutic phlebotomy   Has hematology following   Has been hold T to get RBC lower   PSA levels monitored      Dyslipidemia  Diet controlled  Lipids at goal     Hypertension  No current meds  Not checking at home     "  GERD  Rx-Pepcid taken rarely      Asthma  Rx-albuterol as needed, Singulair     Family history Colon Cancer : Father   UTD on colon cancer screening       Past Medical History:  Past Medical History:   Diagnosis Date    ADD (attention deficit disorder with hyperactivity) 4/15/2013    GERD (gastroesophageal reflux disease)     Hyperlipidemia     Hypertension     Hypogonadism in male     Insomnia     Secondary polycythemia        Past Surgical History:  Past Surgical History:   Procedure Laterality Date    ARTHROSCOPIC CHONDROPLASTY OF KNEE JOINT Right 10/10/2022    Procedure: ARTHROSCOPY, KNEE, WITH CHONDROPLASTY;  Surgeon: Herbie Hoyt MD;  Location: [facility] OR;  Service: Orthopedics;  Laterality: Right;    COLONOSCOPY N/A 11/6/2018    Dr. Chadwick; unremarkable; repeat in 10 years    EXCISION OF MEDIAL MENISCUS OF KNEE Right 10/10/2022    Procedure: MENISCECTOMY, KNEE, MEDIAL;  Surgeon: Herbie Hoyt MD;  Location: [facility] OR;  Service: Orthopedics;  Laterality: Right;    HERNIA REPAIR  2000    L knee scope      L shoulder surgery      VASECTOMY         Family History:  Family History   Problem Relation Age of Onset    Stroke Mother 76    Colon cancer Father         ?    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      " dextroamphetamine-amphetamine (AMPHETAMINE SALT COMBO) 30 mg Tab Take 1 tablet by mouth 3 (three) times daily.       telmisartan-hydrochlorothiazide (MICARDIS HCT) 40-12.5 mg per tablet Take 1 tablet by mouth once daily. 90 tablet 3    testosterone cypionate (DEPOTESTOTERONE CYPIONATE) 200 mg/mL injection INJECT 1 ML (200MG) INTRAMUSCULARLY EVERY 14 DAYS 2 mL 0     No current facility-administered medications on file prior to visit.       Allergies:  Patient has no known allergies.    Immunizations:  Immunization History   Administered Date(s) Administered    COVID-19, MRNA, LN-S, PF (Pfizer) ([place]) 01/24/2022, 02/14/2022    Influenza - Quadrivalent - PF *Preferred* (6 months and older) 09/05/2018, 09/29/2022    Pneumococcal Conjugate - 20 Valent 09/29/2022    Tdap 09/05/2018       Review of Systems:  Review of Systems   All other systems reviewed and are negative.      Objective:    Vitals:  Vitals:    07/31/23 1110   BP: 132/86   Pulse: 83   Resp: 16   Temp: 98 °F (36.7 °C)   TempSrc: Oral   SpO2: 98%   Weight: 112.9 kg (248 lb 14.4 oz)   Height: 6' (1.829 m)   PainSc:   9   PainLoc: Buttocks       Physical Exam  Vitals reviewed.   Constitutional:       General: He is not in acute distress.  HENT:      Head: Normocephalic and atraumatic.   Eyes:      Pupils: Pupils are equal, round, and reactive to light.   Cardiovascular:      Rate and Rhythm: Normal rate and regular rhythm.      Heart sounds: No murmur heard.     No friction rub.   Pulmonary:      Effort: Pulmonary effort is normal.      Breath sounds: Normal breath sounds.   Abdominal:      General: Bowel sounds are normal. There is no distension.      Palpations: Abdomen is soft.      Tenderness: There is no abdominal tenderness.   Musculoskeletal:      Cervical back: Neck supple.   Skin:     General: Skin is warm and dry.      Findings: No rash.          Psychiatric:         Behavior: Behavior normal.             Vitaly Woody MD  Family Medicine

## 2023-07-31 NOTE — TELEPHONE ENCOUNTER
Pt going to [facility] for his labs . PT  wants his Testosterone level checked . Can you enter [facility] order? --lp

## 2023-08-02 ENCOUNTER — OFFICE VISIT (OUTPATIENT)
Dept: SURGERY | Facility: CLINIC | Age: 58
End: 2023-08-02
Payer: COMMERCIAL

## 2023-08-02 VITALS
RESPIRATION RATE: 16 BRPM | HEIGHT: 72 IN | TEMPERATURE: 98 F | SYSTOLIC BLOOD PRESSURE: 144 MMHG | DIASTOLIC BLOOD PRESSURE: 84 MMHG | WEIGHT: 248.88 LBS | BODY MASS INDEX: 33.71 KG/M2 | HEART RATE: 73 BPM

## 2023-08-02 DIAGNOSIS — L03.317 CELLULITIS OF BUTTOCK: ICD-10-CM

## 2023-08-02 DIAGNOSIS — L02.31 ABSCESS, GLUTEAL CLEFT: ICD-10-CM

## 2023-08-02 LAB — BACTERIA SPEC AEROBE CULT: ABNORMAL

## 2023-08-02 PROCEDURE — 3044F PR MOST RECENT HEMOGLOBIN A1C LEVEL <7.0%: ICD-10-PCS | Mod: CPTII,S$GLB,, | Performed by: SURGERY

## 2023-08-02 PROCEDURE — 3008F BODY MASS INDEX DOCD: CPT | Mod: CPTII,S$GLB,, | Performed by: SURGERY

## 2023-08-02 PROCEDURE — 3077F PR MOST RECENT SYSTOLIC BLOOD PRESSURE >= 140 MM HG: ICD-10-PCS | Mod: CPTII,S$GLB,, | Performed by: SURGERY

## 2023-08-02 PROCEDURE — 3008F PR BODY MASS INDEX (BMI) DOCUMENTED: ICD-10-PCS | Mod: CPTII,S$GLB,, | Performed by: SURGERY

## 2023-08-02 PROCEDURE — 3079F PR MOST RECENT DIASTOLIC BLOOD PRESSURE 80-89 MM HG: ICD-10-PCS | Mod: CPTII,S$GLB,, | Performed by: SURGERY

## 2023-08-02 PROCEDURE — 10060 I&D ABSCESS SIMPLE/SINGLE: CPT | Mod: S$GLB,,, | Performed by: SURGERY

## 2023-08-02 PROCEDURE — 3079F DIAST BP 80-89 MM HG: CPT | Mod: CPTII,S$GLB,, | Performed by: SURGERY

## 2023-08-02 PROCEDURE — 99203 PR OFFICE/OUTPT VISIT, NEW, LEVL III, 30-44 MIN: ICD-10-PCS | Mod: 25,S$GLB,, | Performed by: SURGERY

## 2023-08-02 PROCEDURE — 3044F HG A1C LEVEL LT 7.0%: CPT | Mod: CPTII,S$GLB,, | Performed by: SURGERY

## 2023-08-02 PROCEDURE — 10060 PR DRAIN SKIN ABSCESS SIMPLE: ICD-10-PCS | Mod: S$GLB,,, | Performed by: SURGERY

## 2023-08-02 PROCEDURE — 3077F SYST BP >= 140 MM HG: CPT | Mod: CPTII,S$GLB,, | Performed by: SURGERY

## 2023-08-02 PROCEDURE — 99203 OFFICE O/P NEW LOW 30 MIN: CPT | Mod: 25,S$GLB,, | Performed by: SURGERY

## 2023-08-02 RX ORDER — TRAMADOL HYDROCHLORIDE 50 MG/1
50 TABLET ORAL EVERY 6 HOURS PRN
Qty: 16 TABLET | Refills: 0 | Status: SHIPPED | OUTPATIENT
Start: 2023-08-02 | End: 2023-08-09

## 2023-08-02 RX ORDER — IBUPROFEN 800 MG/1
800 TABLET ORAL 3 TIMES DAILY
Qty: 15 TABLET | Refills: 0 | Status: SHIPPED | OUTPATIENT
Start: 2023-08-02 | End: 2023-08-07

## 2023-08-03 ENCOUNTER — LAB VISIT (OUTPATIENT)
Dept: LAB | Facility: HOSPITAL | Age: 58
End: 2023-08-03
Attending: FAMILY MEDICINE
Payer: COMMERCIAL

## 2023-08-03 ENCOUNTER — OFFICE VISIT (OUTPATIENT)
Dept: OPTOMETRY | Facility: CLINIC | Age: 58
End: 2023-08-03
Payer: COMMERCIAL

## 2023-08-03 ENCOUNTER — TELEPHONE (OUTPATIENT)
Dept: OPTOMETRY | Facility: CLINIC | Age: 58
End: 2023-08-03
Payer: COMMERCIAL

## 2023-08-03 ENCOUNTER — OFFICE VISIT (OUTPATIENT)
Dept: FAMILY MEDICINE | Facility: CLINIC | Age: 58
End: 2023-08-03
Payer: COMMERCIAL

## 2023-08-03 VITALS
HEIGHT: 72 IN | DIASTOLIC BLOOD PRESSURE: 76 MMHG | OXYGEN SATURATION: 97 % | BODY MASS INDEX: 33.41 KG/M2 | RESPIRATION RATE: 16 BRPM | WEIGHT: 246.69 LBS | HEART RATE: 68 BPM | SYSTOLIC BLOOD PRESSURE: 132 MMHG

## 2023-08-03 DIAGNOSIS — H43.393 VITREOUS FLOATERS, BILATERAL: ICD-10-CM

## 2023-08-03 DIAGNOSIS — Z00.00 WELLNESS EXAMINATION: ICD-10-CM

## 2023-08-03 DIAGNOSIS — H52.4 HYPEROPIA WITH PRESBYOPIA OF BOTH EYES: ICD-10-CM

## 2023-08-03 DIAGNOSIS — H53.9 VISION CHANGES: ICD-10-CM

## 2023-08-03 DIAGNOSIS — L03.317 CELLULITIS OF BUTTOCK: Primary | ICD-10-CM

## 2023-08-03 DIAGNOSIS — Z12.5 PROSTATE CANCER SCREENING: ICD-10-CM

## 2023-08-03 DIAGNOSIS — L02.92 FURUNCLE: ICD-10-CM

## 2023-08-03 DIAGNOSIS — H52.03 HYPEROPIA WITH PRESBYOPIA OF BOTH EYES: ICD-10-CM

## 2023-08-03 DIAGNOSIS — Z01.00 EXAMINATION OF EYES AND VISION: Primary | ICD-10-CM

## 2023-08-03 DIAGNOSIS — Z13.5 GLAUCOMA SCREENING: ICD-10-CM

## 2023-08-03 LAB
ALBUMIN SERPL BCP-MCNC: 4 G/DL (ref 3.5–5.2)
ALP SERPL-CCNC: 82 U/L (ref 55–135)
ALT SERPL W/O P-5'-P-CCNC: 23 U/L (ref 10–44)
ANION GAP SERPL CALC-SCNC: 18 MMOL/L (ref 8–16)
AST SERPL-CCNC: 20 U/L (ref 10–40)
BASOPHILS # BLD AUTO: 0.05 K/UL (ref 0–0.2)
BASOPHILS NFR BLD: 0.7 % (ref 0–1.9)
BILIRUB SERPL-MCNC: 0.8 MG/DL (ref 0.1–1)
BUN SERPL-MCNC: 26 MG/DL (ref 6–20)
CALCIUM SERPL-MCNC: 10 MG/DL (ref 8.7–10.5)
CHLORIDE SERPL-SCNC: 98 MMOL/L (ref 95–110)
CHOLEST SERPL-MCNC: 215 MG/DL (ref 120–199)
CHOLEST/HDLC SERPL: 4.7 {RATIO} (ref 2–5)
CO2 SERPL-SCNC: 24 MMOL/L (ref 23–29)
COMPLEXED PSA SERPL-MCNC: 0.76 NG/ML (ref 0–4)
CREAT SERPL-MCNC: 1.3 MG/DL (ref 0.5–1.4)
DIFFERENTIAL METHOD: ABNORMAL
EOSINOPHIL # BLD AUTO: 0.2 K/UL (ref 0–0.5)
EOSINOPHIL NFR BLD: 3.2 % (ref 0–8)
ERYTHROCYTE [DISTWIDTH] IN BLOOD BY AUTOMATED COUNT: 14.7 % (ref 11.5–14.5)
EST. GFR  (NO RACE VARIABLE): >60 ML/MIN/1.73 M^2
ESTIMATED AVG GLUCOSE: 91 MG/DL (ref 68–131)
GLUCOSE SERPL-MCNC: 93 MG/DL (ref 70–110)
HBA1C MFR BLD: 4.8 % (ref 4–5.6)
HCT VFR BLD AUTO: 59.8 % (ref 40–54)
HDLC SERPL-MCNC: 46 MG/DL (ref 40–75)
HDLC SERPL: 21.4 % (ref 20–50)
HGB BLD-MCNC: 19.3 G/DL (ref 14–18)
IMM GRANULOCYTES # BLD AUTO: 0.02 K/UL (ref 0–0.04)
IMM GRANULOCYTES NFR BLD AUTO: 0.3 % (ref 0–0.5)
LDLC SERPL CALC-MCNC: 147 MG/DL (ref 63–159)
LYMPHOCYTES # BLD AUTO: 1.1 K/UL (ref 1–4.8)
LYMPHOCYTES NFR BLD: 15.3 % (ref 18–48)
MCH RBC QN AUTO: 29.4 PG (ref 27–31)
MCHC RBC AUTO-ENTMCNC: 32.3 G/DL (ref 32–36)
MCV RBC AUTO: 91 FL (ref 82–98)
MONOCYTES # BLD AUTO: 0.6 K/UL (ref 0.3–1)
MONOCYTES NFR BLD: 8.4 % (ref 4–15)
NEUTROPHILS # BLD AUTO: 5.3 K/UL (ref 1.8–7.7)
NEUTROPHILS NFR BLD: 72.1 % (ref 38–73)
NONHDLC SERPL-MCNC: 169 MG/DL
NRBC BLD-RTO: 0 /100 WBC
PLATELET # BLD AUTO: 273 K/UL (ref 150–450)
PMV BLD AUTO: 9.7 FL (ref 9.2–12.9)
POTASSIUM SERPL-SCNC: 4.7 MMOL/L (ref 3.5–5.1)
PROT SERPL-MCNC: 8.1 G/DL (ref 6–8.4)
RBC # BLD AUTO: 6.57 M/UL (ref 4.6–6.2)
SODIUM SERPL-SCNC: 140 MMOL/L (ref 136–145)
T4 FREE SERPL-MCNC: 1 NG/DL (ref 0.71–1.51)
TESTOST SERPL-MCNC: 299 NG/DL (ref 304–1227)
TRIGL SERPL-MCNC: 110 MG/DL (ref 30–150)
TSH SERPL DL<=0.005 MIU/L-ACNC: 1.8 UIU/ML (ref 0.4–4)
WBC # BLD AUTO: 7.27 K/UL (ref 3.9–12.7)

## 2023-08-03 PROCEDURE — 99213 OFFICE O/P EST LOW 20 MIN: CPT | Mod: S$GLB,,, | Performed by: FAMILY MEDICINE

## 2023-08-03 PROCEDURE — 1159F PR MEDICATION LIST DOCUMENTED IN MEDICAL RECORD: ICD-10-PCS | Mod: CPTII,S$GLB,, | Performed by: FAMILY MEDICINE

## 2023-08-03 PROCEDURE — 84403 ASSAY OF TOTAL TESTOSTERONE: CPT | Performed by: FAMILY MEDICINE

## 2023-08-03 PROCEDURE — 85025 COMPLETE CBC W/AUTO DIFF WBC: CPT | Performed by: FAMILY MEDICINE

## 2023-08-03 PROCEDURE — 84153 ASSAY OF PSA TOTAL: CPT | Performed by: FAMILY MEDICINE

## 2023-08-03 PROCEDURE — 3078F PR MOST RECENT DIASTOLIC BLOOD PRESSURE < 80 MM HG: ICD-10-PCS | Mod: CPTII,S$GLB,, | Performed by: FAMILY MEDICINE

## 2023-08-03 PROCEDURE — 1159F MED LIST DOCD IN RCRD: CPT | Mod: CPTII,S$GLB,, | Performed by: FAMILY MEDICINE

## 2023-08-03 PROCEDURE — 99213 PR OFFICE/OUTPT VISIT, EST, LEVL III, 20-29 MIN: ICD-10-PCS | Mod: S$GLB,,, | Performed by: FAMILY MEDICINE

## 2023-08-03 PROCEDURE — 3075F PR MOST RECENT SYSTOLIC BLOOD PRESS GE 130-139MM HG: ICD-10-PCS | Mod: CPTII,S$GLB,, | Performed by: FAMILY MEDICINE

## 2023-08-03 PROCEDURE — 83036 HEMOGLOBIN GLYCOSYLATED A1C: CPT | Performed by: FAMILY MEDICINE

## 2023-08-03 PROCEDURE — 80061 LIPID PANEL: CPT | Performed by: FAMILY MEDICINE

## 2023-08-03 PROCEDURE — 92004 PR EYE EXAM, NEW PATIENT,COMPREHESV: ICD-10-PCS | Mod: S$GLB,,, | Performed by: OPTOMETRIST

## 2023-08-03 PROCEDURE — 84443 ASSAY THYROID STIM HORMONE: CPT | Performed by: FAMILY MEDICINE

## 2023-08-03 PROCEDURE — 80053 COMPREHEN METABOLIC PANEL: CPT | Performed by: FAMILY MEDICINE

## 2023-08-03 PROCEDURE — 99999 PR PBB SHADOW E&M-EST. PATIENT-LVL III: CPT | Mod: PBBFAC,,, | Performed by: OPTOMETRIST

## 2023-08-03 PROCEDURE — 3008F PR BODY MASS INDEX (BMI) DOCUMENTED: ICD-10-PCS | Mod: CPTII,S$GLB,, | Performed by: FAMILY MEDICINE

## 2023-08-03 PROCEDURE — 3008F BODY MASS INDEX DOCD: CPT | Mod: CPTII,S$GLB,, | Performed by: FAMILY MEDICINE

## 2023-08-03 PROCEDURE — 3075F SYST BP GE 130 - 139MM HG: CPT | Mod: CPTII,S$GLB,, | Performed by: FAMILY MEDICINE

## 2023-08-03 PROCEDURE — 1159F MED LIST DOCD IN RCRD: CPT | Mod: CPTII,S$GLB,, | Performed by: OPTOMETRIST

## 2023-08-03 PROCEDURE — 92004 COMPRE OPH EXAM NEW PT 1/>: CPT | Mod: S$GLB,,, | Performed by: OPTOMETRIST

## 2023-08-03 PROCEDURE — 99999 PR PBB SHADOW E&M-EST. PATIENT-LVL IV: ICD-10-PCS | Mod: PBBFAC,,, | Performed by: FAMILY MEDICINE

## 2023-08-03 PROCEDURE — 92015 PR REFRACTION: ICD-10-PCS | Mod: S$GLB,,, | Performed by: OPTOMETRIST

## 2023-08-03 PROCEDURE — 36415 COLL VENOUS BLD VENIPUNCTURE: CPT | Mod: PN | Performed by: FAMILY MEDICINE

## 2023-08-03 PROCEDURE — 92015 DETERMINE REFRACTIVE STATE: CPT | Mod: S$GLB,,, | Performed by: OPTOMETRIST

## 2023-08-03 PROCEDURE — 84439 ASSAY OF FREE THYROXINE: CPT | Performed by: FAMILY MEDICINE

## 2023-08-03 PROCEDURE — 3078F DIAST BP <80 MM HG: CPT | Mod: CPTII,S$GLB,, | Performed by: FAMILY MEDICINE

## 2023-08-03 PROCEDURE — 99999 PR PBB SHADOW E&M-EST. PATIENT-LVL IV: CPT | Mod: PBBFAC,,, | Performed by: FAMILY MEDICINE

## 2023-08-03 PROCEDURE — 99999 PR PBB SHADOW E&M-EST. PATIENT-LVL III: ICD-10-PCS | Mod: PBBFAC,,, | Performed by: OPTOMETRIST

## 2023-08-03 PROCEDURE — 1159F PR MEDICATION LIST DOCUMENTED IN MEDICAL RECORD: ICD-10-PCS | Mod: CPTII,S$GLB,, | Performed by: OPTOMETRIST

## 2023-08-03 RX ORDER — SULFAMETHOXAZOLE AND TRIMETHOPRIM 800; 160 MG/1; MG/1
1 TABLET ORAL 2 TIMES DAILY
Qty: 20 TABLET | Refills: 0 | Status: SHIPPED | OUTPATIENT
Start: 2023-08-03 | End: 2023-11-01

## 2023-08-03 NOTE — PATIENT INSTRUCTIONS
"DRY EYES -- BURNING OR MOR SYMPTOMS:  Use Over The Counter artificial tears as needed for dry eye symptoms.   Some common brands include:  Systane, Optive, Refresh, and Thera-Tears.  These drops can be used as frequently as desired, but may be most helpful use during long periods of concentrated work.  For example, reading / working at the computer. Start with 3-4x per day.     Nighttime Ophthalmic gel or ointments are available: Refresh PM, Genteal, and Lacrilube.    Avoid drops that "get redness out" (Visine, Murine, Clear Eyes), as these may contain medication that could further irritate the eyes, especially with chronic use.    ALLERGY EYES -- ITCHING SYMPTOMS:  Over the counter medications include--Pataday, Zaditor, and Alaway.  Use as directed 1-2 drops daily for symptoms of itching / watering eyes.  These drops will not help for dry eye or exposure symptoms.    REDNESS RELIEF:  Lumify---is a good redness reliever that will not cause irritation if used chronically.        FLASHES / FLOATERS / POSTERIOR VITREOUS DETACHMENT    Call the clinic if you have any further changes in symptoms.  Including:  Increased numbers of floaters or flashing lights, dimness or darkness that moves through or stays constant in your vision, or any pain in the eye (s).    You may sometimes see small specks or clouds moving in your field of vision.  They are called FLOATERS.  You can often see them when looking at a plain background, like a blank wall or blue anjali.  Floaters are actually tiny clumps of gel or cells inside the VITREOUS, the clear jelly-like fluid that fills the inside of your eye.    While these objects look like they are in front of your eye, they are actually floating inside.  What you see are the shadows they cast on the RETINA, the nerve layer at the back of the eye that senses light and allows you to see.      POSTERIOR VITREOUS DETACHMENT    The appearance of new floaters may be alarming.  If you suddenly " develop new floaters, you should contact your eye care professional  right away.    The retina can tear if the shrinking vitreous pulls away from the wall of the eye.  This sometimes causes a small amount of bleeding in the eye that may appear as new floaters.    A torn retina is always a serious problem, since it can lead to a retinal detachment.  You should see your eye care professional as soon as possible if:    even one new floater appears suddenly;  you see sudden flashes of light;  you notice other symptoms, like the loss of side vision, or a curtain closes down in your vision        POSTERIOR VITREOUS DETACHMENT is more common for people who:    are nearsighted;  have had cataract surgery;  have had YAG laser surgery of the eye;  have had inflammation inside the eye;  are over age 60.      While some floaters may remain visible, many of them will fade over time and become less noticeable.  Even if you've had some floaters for years, you should have your eyes checked as soon as possible if you notice new ones.    FLASHING LIGHTS    When the vitreous gel rubs or pulls on the retina, you may see what look like flashing lights or lightning streaks.  These flashes can appear off and on for several weeks or months.      Some people experience flashes of light that appear as jagged lines or heat waves in both eyes, lasting 10-20 minutes.  These flashes are caused by a spasm of blood vessels in the brain, which is called a migraine.    If a headache follows these flashes, it's called a migraine headache.  If   no headache occurs, these flashes are called Ophthalmic or Ocular Migraine.

## 2023-08-03 NOTE — Clinical Note
Hello,  Please call Mr Aguilera [phone]. He'd like info on surgical options including lasik and possibly clear lens exchange.  Thanks Dr ORNELAS

## 2023-08-03 NOTE — TELEPHONE ENCOUNTER
----- Message from Yola Cardenas sent at 8/3/2023  3:30 PM CDT -----  7600$  ----- Message -----  From: Saranya Lopez MA  Sent: 8/3/2023   3:24 PM CDT  To: Yola Cardenas    What is the cost of a clear lens extraction?    ----- Message -----  From: SHANDA Bello OD  Sent: 8/3/2023   3:14 PM CDT  To: [facility] Staff    Hello,   Please call Mr Aguilera [phone]. He'd like info on surgical options including lasik and possibly clear lens exchange.   Thanks  Dr ORNELAS

## 2023-08-03 NOTE — PROGRESS NOTES
HPI    New pt here for annual eye exam DLS- 8 yeas ago    Pt states his vision has been decreasing, using +2.50 readers as needed.   Occasional floater OS.   Htn well controlled on meds  Denies GTTS.   Last edited by Trinity Welch on 8/3/2023  2:27 PM.            Assessment /Plan     For exam results, see Encounter Report.    Examination of eyes and vision    Vitreous floaters, bilateral    Glaucoma screening    Vision changes  -     Ambulatory referral/consult to Optometry    Hyperopia with presbyopia of both eyes      Ocular health exam OU   RD precautions given and reviewed. Patient knows to call/ message if any further changes in symptoms occur.  Not suspect   Referral for this visit   Updated refraction ---consider PAL vs sep pair near / far     Discussed surgical options, lasik and clear lens x ---may consider consult ---to Dr Velazco     Discussed and educated patient on current findings /plan.  RTC 1 year, prn if any changes / issues

## 2023-08-03 NOTE — PROGRESS NOTES
THIS DOCUMENT WAS MADE IN PART WITH VOICE RECOGNITION SOFTWARE.  OCCASIONALLY THIS SOFTWARE WILL MISINTERPRET WORDS OR PHRASES.    Assessment and Plan:    1. Cellulitis of buttock  sulfamethoxazole-trimethoprim 800-160mg (BACTRIM DS) 800-160 mg Tab      2. Furuncle  sulfamethoxazole-trimethoprim 800-160mg (BACTRIM DS) 800-160 mg Tab      3. Wellness examination  CBC Auto Differential    Comprehensive Metabolic Panel    Lipid Panel    Hemoglobin A1C    TSH    T4, Free    Testosterone    Urinalysis, Reflex to Urine Culture Urine, Clean Catch    Urinalysis Microscopic      4. Prostate cancer screening  PSA, Screening      5. Vision changes  Ambulatory referral/consult to Optometry          Infections significantly better   Was able to express copious purulence today through the opening  Has follow-up appointment next week with surgery   Continue Bactrim, confirmed with culture results     Wellness labs as above, follow-up next time for wellness check     Schedule with optometry    ______________________________________________________________________  Subjective:    Chief Complaint:  Chief Complaint   Patient presents with    Follow-up        HPI:  David is a 57 y.o. year old       Patient here to follow-up gluteal cellulitis/abscess  Initiated antibiotics at prior visit, culture did grow MRSA, multiple drug-resistant   Was placed on Bactrim which is sensitive for this particular bacteria   We referred to surgery whom you saw yesterday.  Was prescribed tramadol, ibuprofen by surgeon     Complains about vision changes, likely presbyopia based on his time I   Request eye doctor referral      ADD , Insomnia  Prescriber- Wagner Arguello (? Behavioral specialist)   Rx-Adderall 30 mg 3 times daily, Ambien 10 mg  Reports taking Ambien sparingly      Hypogonadism  rx : depot T :  200 mg every 14 days  Prescriber-Matthew  Has a secondary polycythemia: having therapeutic phlebotomy   Has hematology following   Has been hold T to get  "RBC lower   PSA levels monitored      Dyslipidemia  Diet controlled  Lipids at goal     Hypertension  No current meds  Not checking at home      GERD  Rx-Pepcid taken rarely      Asthma  Rx-albuterol as needed, Singulair     Family history Colon Cancer : Father   UTD on colon cancer screening     Past Medical History:  Past Medical History:   Diagnosis Date    ADD (attention deficit disorder with hyperactivity) 4/15/2013    GERD (gastroesophageal reflux disease)     Hyperlipidemia     Hypertension     Hypogonadism in male     Insomnia     Secondary polycythemia        Past Surgical History:  Past Surgical History:   Procedure Laterality Date    ARTHROSCOPIC CHONDROPLASTY OF KNEE JOINT Right 10/10/2022    Procedure: ARTHROSCOPY, KNEE, WITH CHONDROPLASTY;  Surgeon: Herbie Hoyt MD;  Location: [facility] OR;  Service: Orthopedics;  Laterality: Right;    COLONOSCOPY N/A 11/6/2018    Dr. Chadwick; unremarkable; repeat in 10 years    EXCISION OF MEDIAL MENISCUS OF KNEE Right 10/10/2022    Procedure: MENISCECTOMY, KNEE, MEDIAL;  Surgeon: Herbie Hoyt MD;  Location: [facility] OR;  Service: Orthopedics;  Laterality: Right;    HERNIA REPAIR  2000    L knee scope      L shoulder surgery      VASECTOMY         Family History:  Family History   Problem Relation Age of Onset    Stroke Mother 76    Colon cancer Father         ?   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      dextroamphetamine-amphetamine (AMPHETAMINE SALT COMBO) 30 mg Tab Take 1 tablet by mouth 3 (three) times daily.       ibuprofen (ADVIL,MOTRIN) 800 MG tablet Take 1 tablet (800 mg total) by mouth 3 (three) times daily. for 5 days 15 tablet 0    ketoconazole (NIZORAL) 2 % cream Apply topically 2 (two) times daily. 60 g 2    telmisartan-hydrochlorothiazide (MICARDIS HCT) 40-12.5 mg per tablet Take 1 tablet by mouth once daily. 90 tablet 3    testosterone cypionate (DEPOTESTOTERONE CYPIONATE) 200 mg/mL injection INJECT 1 ML (200MG) INTRAMUSCULARLY EVERY 14 DAYS 2 mL 0    traMADoL (ULTRAM) 50 mg tablet Take 1 tablet (50 mg total) by mouth every 6 (six) hours as needed for Pain. 16 tablet 0    [DISCONTINUED] sulfamethoxazole-trimethoprim 800-160mg (BACTRIM DS) 800-160 mg Tab Take 1 tablet by mouth 2 (two) times daily. 10 tablet 0     No current facility-administered medications on file prior to visit.       Allergies:  Patient has no known allergies.    Immunizations:  Immunization History   Administered Date(s) Administered    COVID-19, MRNA, LN-S, PF (Pfizer) (Gray Cap) 01/24/2022, 02/14/2022    Influenza - Quadrivalent - PF *Preferred* (6 months and older) 09/05/2018, 09/29/2022    Pneumococcal Conjugate - 20 Valent 09/29/2022    Tdap 09/05/2018       Review of Systems:  Review of Systems   All other systems reviewed and are negative.      Objective:    Vitals:  Vitals:    08/03/23 0810   BP: 132/76   Pulse: 68   Resp: 16   SpO2: 97%   Weight: 111.9 kg (246 lb 11.1 oz)   Height: 6' (1.829 m)   PainSc:   4       Physical Exam  Vitals reviewed.   Constitutional:       General: He is not in acute distress.  HENT:      Head: Normocephalic and atraumatic.   Eyes:      Pupils: Pupils are equal, round, and reactive to light.   Cardiovascular:       Rate and Rhythm: Normal rate and regular rhythm.      Heart sounds: No murmur heard.     No friction rub.   Pulmonary:      Effort: Pulmonary effort is normal.      Breath sounds: Normal breath sounds.   Abdominal:      General: Bowel sounds are normal. There is no distension.      Palpations: Abdomen is soft.      Tenderness: There is no abdominal tenderness.   Musculoskeletal:      Cervical back: Neck supple.   Skin:     General: Skin is warm and dry.      Findings: No rash.          Psychiatric:         Behavior: Behavior normal.           Vitaly Woody MD  Family Medicine

## 2023-08-04 DIAGNOSIS — R31.29 MICROSCOPIC HEMATURIA: Primary | ICD-10-CM

## 2023-08-04 NOTE — H&P
GENERAL SURGERY  OUTPATIENT H&P    REASON FOR VISIT/CC: Gluteal abscess    HPI: David Aguilera is a 57 y.o. male who presents for evaluation of a large upper gluteal abscess. Patient initially presented to his PCP with a [place] day history a lesion in the upper right buttocks which started off as a small bump and progressively increased to a large red and hard lesion. Patient had been able to express some purulence a few days ago. His PCP was able to express a significant amount of purulence through the small opening near the edy cleft.  He was given Bactrim and referred to General surgery. Patient reports still with ongoing pain and drainage. No systemic fevers or chills. No history of recurrent abscesses in his area.    I have reviewed the patient's chart including prior progress notes, procedures and testing.     ROS:   Review of Systems    PROBLEM LIST:  Patient Active Problem List   Diagnosis    ADD (attention deficit disorder with hyperactivity)    Non morbid obesity due to excess calories    Pure hypercholesterolemia    Colon cancer screening    Testicular hypofunction    Polycythemia    Hypertension    Insomnia         HISTORY  Past Medical History:   Diagnosis Date    ADD (attention deficit disorder with hyperactivity) 04/15/2013    GERD (gastroesophageal reflux disease)     Hyperlipidemia     Hypertension     Hypogonadism in male     Insomnia     Secondary polycythemia        Past Surgical History:   Procedure Laterality Date    ARTHROSCOPIC CHONDROPLASTY OF KNEE JOINT Right 10/10/2022    Procedure: ARTHROSCOPY, KNEE, WITH CHONDROPLASTY;  Surgeon: Herbie Hoyt MD;  Location: [facility] OR;  Service: Orthopedics;  Laterality: Right;    COLONOSCOPY N/A 2018    Dr. Chadwick; unremarkable; repeat in 10 years    EXCISION OF MEDIAL MENISCUS OF KNEE Right 10/10/2022    Procedure: MENISCECTOMY, KNEE, MEDIAL;  Surgeon: Herbie Hoyt MD;  Location: [facility] OR;  Service: Orthopedics;  Laterality: Right;     HERNIA REPAIR  2000    L knee scope      L shoulder surgery      VASECTOMY         Social History     Tobacco Use    Smoking status: Never    Smokeless tobacco: Never   Substance Use Topics    Alcohol use: Never    Drug use: Never       Family History   Problem Relation Age of Onset    Stroke Mother 76    Colon cancer Father         ?    Stomach cancer Neg Hx     Esophageal cancer Neg Hx     Crohn's disease Neg Hx     Ulcerative colitis Neg Hx     Glaucoma Neg Hx     Macular degeneration Neg Hx     Retinal detachment Neg Hx          MEDS:  Current Outpatient Medications on File Prior to Visit   Medication Sig Dispense Refill    AMBIEN 10 mg Tab Take 10 mg by mouth every evening.      dextroamphetamine-amphetamine (AMPHETAMINE SALT COMBO) 30 mg Tab Take 1 tablet by mouth 3 (three) times daily.       ketoconazole (NIZORAL) 2 % cream Apply topically 2 (two) times daily. 60 g 2    telmisartan-hydrochlorothiazide (MICARDIS HCT) 40-12.5 mg per tablet Take 1 tablet by mouth once daily. 90 tablet 3    testosterone cypionate (DEPOTESTOTERONE CYPIONATE) 200 mg/mL injection INJECT 1 ML (200MG) INTRAMUSCULARLY EVERY 14 DAYS 2 mL 0     No current facility-administered medications on file prior to visit.       ALLERGIES:  Review of patient's allergies indicates:  No Known Allergies      VITALS:  Vitals:    08/02/23 1055   BP: (!) 144/84   Pulse: 73   Resp: 16   Temp: 97.9 °F (36.6 °C)         PHYSICAL EXAM:  Physical Exam  Vitals reviewed.   Constitutional:       General: He is not in acute distress.     Appearance: Normal appearance. He is well-developed.   HENT:      Head: Normocephalic and atraumatic.   Eyes:      General: No scleral icterus.  Neck:      Trachea: No tracheal deviation.   Cardiovascular:      Rate and Rhythm: Normal rate and regular rhythm.      Pulses: Normal pulses.   Pulmonary:      Effort: Pulmonary effort is normal. No respiratory distress.      Breath sounds: Normal breath sounds.   Abdominal:       "General: There is no distension.      Palpations: Abdomen is soft.      Tenderness: There is no abdominal tenderness.   Musculoskeletal:         General: No swelling or tenderness. Normal range of motion.      Cervical back: Normal range of motion and neck supple. No rigidity.   Skin:     General: Skin is warm and dry.      Coloration: Skin is not jaundiced.      Findings: No erythema.             Comments: An approximately 8 x 5 cm area of induration along the left upper buttocks with an opening near the  cleft with expressible purulence. Moderate overlying erythema. No crepitus.   Neurological:      General: No focal deficit present.      Mental Status: He is alert and oriented to person, place, and time. He is not disoriented.      Motor: No weakness or abnormal muscle tone.   Psychiatric:         Mood and Affect: Mood normal.         Behavior: Behavior normal.         Thought Content: Thought content normal.         Judgment: Judgment normal.           LABS:  Lab Results   Component Value Date    WBC 7.27 2023    RBC 6.57 (H) 2023    HGB 19.3 (H) 2023    HCT 59.8 (H) 2023     2023     Lab Results   Component Value Date    GLU 93 2023     2023    K 4.7 2023    CL 98 2023    CO2 24 2023    BUN 26 (H) 2023    CREATININE 1.3 2023    CALCIUM 10.0 2023     Lab Results   Component Value Date    ALT 23 2023    AST 20 2023    ALKPHOS 82 2023    BILITOT 0.8 2023     No results found for: "MG", "PHOS"      ASSESSMENT & PLAN:  57 y.o. male with left upper gluteal abscess  -patient reports some improvement after incision drainage however lesion is still very indurated and red  -recommended incision and drainage to further open the wound to allow for ongoing drainage of purulence  -after discussion patient agreed proceed with I&D, see procedure note below for further details  -recommend continue antibiotics " as prescribed  -wash area daily with soap and water and change with dry gauze bandage as needed when it becomes soiled  -has follow-up with PCP later this week, will have follow up with us next week  -if patient develops fevers, worsening pain or spreading redness I have instructed him to go to the emergency room    2023    David Aguilera  [id number]    PROCEDURE PERFORMED: Incision and drainage left gluteal abscess    PERFORMING SURGEON: Jesus Thorne Jr    CONSENT:  The risks benefits and alternatives of the procedure were discussed with the patient. The patient was queried for questions and all concerns were addressed.  The patient provided written consent for the procedure.    ANESTHESIA: Lidocaine 2%    INDICATION: Left gluteal abscess and cellulitis    FINDINGS: Drainage of purulence obtained    PROCEDURE IN DETAIL: Verbal consent obtained.  Area prepped with Betadine.  Local anesthetic injected near the edy cleft near an area of open wound. I then performed a stab incision and then removed an approximally 0.5 x 0.5 cm area of tissue to allow adequate drainage. Wound was probed but the patient did not tolerate this well therefore was not aggressively explored. We did have drainage of purulence. A gauze bandage was applied.    EBL: Minimal    COMPLICATIONS: none

## 2023-08-09 ENCOUNTER — OFFICE VISIT (OUTPATIENT)
Dept: SURGERY | Facility: CLINIC | Age: 58
End: 2023-08-09
Payer: COMMERCIAL

## 2023-08-09 VITALS
SYSTOLIC BLOOD PRESSURE: 129 MMHG | HEIGHT: 72 IN | WEIGHT: 244 LBS | HEART RATE: 88 BPM | RESPIRATION RATE: 18 BRPM | DIASTOLIC BLOOD PRESSURE: 86 MMHG | BODY MASS INDEX: 33.05 KG/M2 | TEMPERATURE: 97 F

## 2023-08-09 DIAGNOSIS — L02.31 ABSCESS, GLUTEAL CLEFT: Primary | ICD-10-CM

## 2023-08-09 PROCEDURE — 3074F PR MOST RECENT SYSTOLIC BLOOD PRESSURE < 130 MM HG: ICD-10-PCS | Mod: CPTII,S$GLB,, | Performed by: SURGERY

## 2023-08-09 PROCEDURE — 99024 POSTOP FOLLOW-UP VISIT: CPT | Mod: S$GLB,,, | Performed by: SURGERY

## 2023-08-09 PROCEDURE — 3079F PR MOST RECENT DIASTOLIC BLOOD PRESSURE 80-89 MM HG: ICD-10-PCS | Mod: CPTII,S$GLB,, | Performed by: SURGERY

## 2023-08-09 PROCEDURE — 3008F BODY MASS INDEX DOCD: CPT | Mod: CPTII,S$GLB,, | Performed by: SURGERY

## 2023-08-09 PROCEDURE — 3079F DIAST BP 80-89 MM HG: CPT | Mod: CPTII,S$GLB,, | Performed by: SURGERY

## 2023-08-09 PROCEDURE — 3044F HG A1C LEVEL LT 7.0%: CPT | Mod: CPTII,S$GLB,, | Performed by: SURGERY

## 2023-08-09 PROCEDURE — 1159F MED LIST DOCD IN RCRD: CPT | Mod: CPTII,S$GLB,, | Performed by: SURGERY

## 2023-08-09 PROCEDURE — 3008F PR BODY MASS INDEX (BMI) DOCUMENTED: ICD-10-PCS | Mod: CPTII,S$GLB,, | Performed by: SURGERY

## 2023-08-09 PROCEDURE — 1159F PR MEDICATION LIST DOCUMENTED IN MEDICAL RECORD: ICD-10-PCS | Mod: CPTII,S$GLB,, | Performed by: SURGERY

## 2023-08-09 PROCEDURE — 3074F SYST BP LT 130 MM HG: CPT | Mod: CPTII,S$GLB,, | Performed by: SURGERY

## 2023-08-09 PROCEDURE — 99024 PR POST-OP FOLLOW-UP VISIT: ICD-10-PCS | Mod: S$GLB,,, | Performed by: SURGERY

## 2023-08-09 PROCEDURE — 3044F PR MOST RECENT HEMOGLOBIN A1C LEVEL <7.0%: ICD-10-PCS | Mod: CPTII,S$GLB,, | Performed by: SURGERY

## 2023-08-09 NOTE — PROGRESS NOTES
GENERAL SURGERY  PROGRESS NOTE    HPI: David Aguilera is a 57 y.o. male status post incision and drainage of right upper gluteal abscess here for follow-up. Patient is continuing on antibiotics prescribed by his PCP.  Reports drainage has significantly decreased and now he has a small amount of yellowish clear fluid draining.  Pain much improved.  Denies fevers or chills.    VITALS:  Vitals:    08/09/23 1052   BP: 129/86   Pulse: 88   Resp: 18   Temp: 97.4 °F (36.3 °C)       PHYSICAL EXAM:  Upper gluteal abscess appears well drained without any significant induration or erythema, there is skin changes within the intertriginous folds consistent with fungal infection for which the patient is on treatment    PATHOLOGY:  N/a    ASSESSMENT & PLAN:  57 y.o. male s/p incision drainage of right upper gluteal abscess  -area appears adequately drained without any significant residual inflammation  -expect incision to heal completely in the next week  -continue showering soap and water daily and applying bandages needed  -complete antibiotics as prescribed  -no obvious cyst or pilonidal disease present, suspect possible just abscess secondary to ingrown hair question?  -no need to return to clinic unless he develops a lesion at the site or evidence of recurrence

## 2023-08-15 ENCOUNTER — TELEPHONE (OUTPATIENT)
Dept: FAMILY MEDICINE | Facility: CLINIC | Age: 58
End: 2023-08-15
Payer: COMMERCIAL

## 2023-08-15 NOTE — LETTER
August 15, 2023      [facility]  [address]  [place] [address]  Phone: [phone]  Fax: [phone]       Patient: David Aguilera   YOB: 1965  Date of Visit: 08/15/2023    To Whom It May Concern:    Ladi Aguilera  Is to have therapeutic phlebotomy every 12 weeks for 1 year if his H/H is between 15-45.    Minimum Hemoglobin: 15  Do not  permit phlebotomy if hematocrit is below 45      Sincerely,              Vitaly Woody MD

## 2023-08-15 NOTE — TELEPHONE ENCOUNTER
Patient calling needing a therapeutic phlebotomy order faxed to the blood center      Letter written per Dr. Bong soto and faxed to [phone]. Pt notified       Your fax has been successfully sent to [phone] at [phone].  ------------------------------------------------------------  From: [id number]  ------------------------------------------------------------  8/15/2023 1:58:28 PM Transmission Record   Sent to [phone] with remote ID "   Result: (0/339;0/0) Success   Page record: 1 - 2   Elapsed time: 01:44 on channel 52

## 2023-08-15 NOTE — TELEPHONE ENCOUNTER
----- Message from Nicole Hooker sent at 8/15/2023 12:49 PM CDT -----  Regarding: orders  Type:  Needs Medical Advice    Who Called: Pt      Would the patient rather a call back or a response via [facility]? Callback    Best Call Back Number: [phone]    Additional Information: Needs blood work order to [email or web address] Fax [phone] .  Pt is at the lab now Please advise --------------- Thank you

## 2023-08-15 NOTE — TELEPHONE ENCOUNTER
----- Message from Amee Oakley sent at 8/15/2023  1:31 PM CDT -----  Caller is requesting to schedule their Lab appointment prior to appointment.      Name of Caller:  Pt    Preferred Date and Time of Labs:  8/15    Date of EPP Appointment:  9/5    Where would they like the lab performed[facility]    Would the patient rather a call back or a response via [facility]?  Call back    Best Call Back Number: [phone]    Additional Information:  Needs blood work order to be sent to [email or web address] Fax [phone].   Pt is sitting in their office waiting for orders to be sent.   Please call back to advise. Thanks!

## 2023-09-06 ENCOUNTER — ON-DEMAND VIRTUAL (OUTPATIENT)
Dept: URGENT CARE | Facility: CLINIC | Age: 58
End: 2023-09-06
Payer: COMMERCIAL

## 2023-09-06 ENCOUNTER — TELEPHONE (OUTPATIENT)
Dept: FAMILY MEDICINE | Facility: CLINIC | Age: 58
End: 2023-09-06
Payer: COMMERCIAL

## 2023-09-06 DIAGNOSIS — M25.561 ACUTE PAIN OF RIGHT KNEE: Primary | ICD-10-CM

## 2023-09-06 PROCEDURE — 99212 OFFICE O/P EST SF 10 MIN: CPT | Mod: 95,,, | Performed by: INTERNAL MEDICINE

## 2023-09-06 PROCEDURE — 99212 PR OFFICE/OUTPT VISIT, EST, LEVL II, 10-19 MIN: ICD-10-PCS | Mod: 95,,, | Performed by: INTERNAL MEDICINE

## 2023-09-06 NOTE — PROGRESS NOTES
Subjective:      Patient ID: David Aguilera is a 57 y.o. male.    Vitals:  vitals were not taken for this visit.     Chief Complaint: Knee Pain      Visit Type: TELE AUDIOVISUAL    Present with the patient at the time of consultation: TELEMED PRESENT WITH PATIENT: None    Past Medical History:   Diagnosis Date    ADD (attention deficit disorder with hyperactivity) 04/15/2013    GERD (gastroesophageal reflux disease)     Hyperlipidemia     Hypertension     Hypogonadism in male     Insomnia     Secondary polycythemia      Past Surgical History:   Procedure Laterality Date    ARTHROSCOPIC CHONDROPLASTY OF KNEE JOINT Right 10/10/2022    Procedure: ARTHROSCOPY, KNEE, WITH CHONDROPLASTY;  Surgeon: Herbie Hoyt MD;  Location: [facility] OR;  Service: Orthopedics;  Laterality: Right;    COLONOSCOPY N/A 11/06/2018    Dr. Chadwick; unremarkable; repeat in 10 years    EXCISION OF MEDIAL MENISCUS OF KNEE Right 10/10/2022    Procedure: MENISCECTOMY, KNEE, MEDIAL;  Surgeon: Herbie Hoyt MD;  Location: [facility] OR;  Service: Orthopedics;  Laterality: Right;    HERNIA REPAIR  2000    L knee scope      L shoulder surgery      VASECTOMY       Review of patient's allergies indicates:  No Known Allergies  Current Outpatient Medications on File Prior to Visit   Medication Sig Dispense Refill    AMBIEN 10 mg Tab Take 10 mg by mouth every evening.      dextroamphetamine-amphetamine (AMPHETAMINE SALT COMBO) 30 mg Tab Take 1 tablet by mouth 3 (three) times daily.       ketoconazole (NIZORAL) 2 % cream Apply topically 2 (two) times daily. 60 g 2    sulfamethoxazole-trimethoprim 800-160mg (BACTRIM DS) 800-160 mg Tab Take 1 tablet by mouth 2 (two) times daily. 20 tablet 0    telmisartan-hydrochlorothiazide (MICARDIS HCT) 40-12.5 mg per tablet Take 1 tablet by mouth once daily. 90 tablet 3    testosterone cypionate (DEPOTESTOTERONE CYPIONATE) 200 mg/mL injection INJECT 1 ML (200MG) INTRAMUSCULARLY EVERY 14 DAYS 2 mL 0     No current  "facility-administered medications on file prior to visit.     Family History   Problem Relation Age of Onset    Stroke Mother 76    Colon cancer Father         ?    Stomach cancer Neg Hx     Esophageal cancer Neg Hx     Crohn's disease Neg Hx     Ulcerative colitis Neg Hx     Glaucoma Neg Hx     Macular degeneration Neg Hx     Retinal detachment Neg Hx            [facility] Peq Odvv Intake    9/6/2023 12:12 PM CDT - Filed by Patient   Describe your reason for todays visit Right knee.   What is your current physical address in the event of a medical emergency? [address] [place] [address]   Are you able to take your vital signs? No   Please attach any relevant images or files          In [place] via video conference.     58 y/o man with a history of right knee surgery. He thinks he has "blown it out again" and is seeking an MRI.     Knee Pain         Constitution: Negative for fever.   Musculoskeletal:  Positive for pain, joint pain and joint swelling.        Objective:   The physical exam was conducted virtually.  Physical Exam   Constitutional: He does not appear ill. No distress.   Musculoskeletal:      Comments: Unable to examine the knee properly.  The right knee is swollen and has reduced range of motion.    Neurological: He is alert.   No other pertinent findings.     Assessment:     1. Acute pain of right knee        Plan:       Acute pain of right knee      You will need to go in and be seen to get an MRI ordered. They need to access the issue and then determine the best course of action. We cannot order lab/radiology on this platform.                "

## 2023-09-06 NOTE — TELEPHONE ENCOUNTER
Spoke with pt in [place] said he needs an MRI on his knee due it it giving him unbearable pain, pt states the pain is a 10/10 if he tried to straighten out his leg or turn it.     Please advise

## 2023-09-07 NOTE — TELEPHONE ENCOUNTER
Patient has to have an exam, I can not just order an MRI via message .  Insurance will not allow this.

## 2023-09-11 ENCOUNTER — OFFICE VISIT (OUTPATIENT)
Dept: ORTHOPEDICS | Facility: CLINIC | Age: 58
End: 2023-09-11
Payer: COMMERCIAL

## 2023-09-11 ENCOUNTER — HOSPITAL ENCOUNTER (OUTPATIENT)
Dept: RADIOLOGY | Facility: HOSPITAL | Age: 58
Discharge: HOME | End: 2023-09-11
Attending: ORTHOPAEDIC SURGERY
Payer: COMMERCIAL

## 2023-09-11 VITALS — WEIGHT: 244 LBS | RESPIRATION RATE: 16 BRPM | HEIGHT: 72 IN | BODY MASS INDEX: 33.05 KG/M2

## 2023-09-11 DIAGNOSIS — M25.561 ACUTE PAIN OF RIGHT KNEE: Primary | ICD-10-CM

## 2023-09-11 DIAGNOSIS — S89.91XA INJURY OF RIGHT KNEE, INITIAL ENCOUNTER: ICD-10-CM

## 2023-09-11 DIAGNOSIS — S89.91XA INJURY OF RIGHT KNEE, INITIAL ENCOUNTER: Primary | ICD-10-CM

## 2023-09-11 PROCEDURE — 20610 DRAIN/INJ JOINT/BURSA W/O US: CPT | Mod: RT,S$GLB,, | Performed by: ORTHOPAEDIC SURGERY

## 2023-09-11 PROCEDURE — 99999 PR PBB SHADOW E&M-EST. PATIENT-LVL III: CPT | Mod: PBBFAC,,, | Performed by: ORTHOPAEDIC SURGERY

## 2023-09-11 PROCEDURE — 3044F HG A1C LEVEL LT 7.0%: CPT | Mod: CPTII,S$GLB,, | Performed by: ORTHOPAEDIC SURGERY

## 2023-09-11 PROCEDURE — 73564 X-RAY EXAM KNEE 4 OR MORE: CPT | Mod: 26,RT,, | Performed by: RADIOLOGY

## 2023-09-11 PROCEDURE — 20610 LARGE JOINT ASPIRATION/INJECTION: R KNEE: ICD-10-PCS | Mod: RT,S$GLB,, | Performed by: ORTHOPAEDIC SURGERY

## 2023-09-11 PROCEDURE — 73562 X-RAY EXAM OF KNEE 3: CPT | Mod: 26,LT,, | Performed by: RADIOLOGY

## 2023-09-11 PROCEDURE — 3044F PR MOST RECENT HEMOGLOBIN A1C LEVEL <7.0%: ICD-10-PCS | Mod: CPTII,S$GLB,, | Performed by: ORTHOPAEDIC SURGERY

## 2023-09-11 PROCEDURE — 3008F PR BODY MASS INDEX (BMI) DOCUMENTED: ICD-10-PCS | Mod: CPTII,S$GLB,, | Performed by: ORTHOPAEDIC SURGERY

## 2023-09-11 PROCEDURE — 99999 PR PBB SHADOW E&M-EST. PATIENT-LVL III: ICD-10-PCS | Mod: PBBFAC,,, | Performed by: ORTHOPAEDIC SURGERY

## 2023-09-11 PROCEDURE — 99214 PR OFFICE/OUTPT VISIT, EST, LEVL IV, 30-39 MIN: ICD-10-PCS | Mod: 25,S$GLB,, | Performed by: ORTHOPAEDIC SURGERY

## 2023-09-11 PROCEDURE — 3008F BODY MASS INDEX DOCD: CPT | Mod: CPTII,S$GLB,, | Performed by: ORTHOPAEDIC SURGERY

## 2023-09-11 PROCEDURE — 1159F PR MEDICATION LIST DOCUMENTED IN MEDICAL RECORD: ICD-10-PCS | Mod: CPTII,S$GLB,, | Performed by: ORTHOPAEDIC SURGERY

## 2023-09-11 PROCEDURE — 1160F RVW MEDS BY RX/DR IN RCRD: CPT | Mod: CPTII,S$GLB,, | Performed by: ORTHOPAEDIC SURGERY

## 2023-09-11 PROCEDURE — 1159F MED LIST DOCD IN RCRD: CPT | Mod: CPTII,S$GLB,, | Performed by: ORTHOPAEDIC SURGERY

## 2023-09-11 PROCEDURE — 99214 OFFICE O/P EST MOD 30 MIN: CPT | Mod: 25,S$GLB,, | Performed by: ORTHOPAEDIC SURGERY

## 2023-09-11 PROCEDURE — 73562 XR KNEE ORTHO RIGHT WITH FLEXION: ICD-10-PCS | Mod: 26,LT,, | Performed by: RADIOLOGY

## 2023-09-11 PROCEDURE — 73564 XR KNEE ORTHO RIGHT WITH FLEXION: ICD-10-PCS | Mod: 26,RT,, | Performed by: RADIOLOGY

## 2023-09-11 PROCEDURE — 1160F PR REVIEW ALL MEDS BY PRESCRIBER/CLIN PHARMACIST DOCUMENTED: ICD-10-PCS | Mod: CPTII,S$GLB,, | Performed by: ORTHOPAEDIC SURGERY

## 2023-09-11 PROCEDURE — 73564 X-RAY EXAM KNEE 4 OR MORE: CPT | Mod: TC,PO,RT

## 2023-09-11 RX ORDER — METHYLPREDNISOLONE ACETATE 80 MG/ML
80 INJECTION, SUSPENSION INTRA-ARTICULAR; INTRALESIONAL; INTRAMUSCULAR; SOFT TISSUE
Status: DISCONTINUED | OUTPATIENT
Start: 2023-09-11 | End: 2023-09-11 | Stop reason: HOSPADM

## 2023-09-11 RX ADMIN — METHYLPREDNISOLONE ACETATE 80 MG: 80 INJECTION, SUSPENSION INTRA-ARTICULAR; INTRALESIONAL; INTRAMUSCULAR; SOFT TISSUE at 01:09

## 2023-09-11 NOTE — PROGRESS NOTES
CC:  57-year-old male presents for evaluation of right knee pain.  The patient does have a history of right knee arthroscopy with meniscectomy about a year ago done by another surgeon.  The patient reports he was doing well until this weekend when he fell through some decking.  He currently complains of pain in the right knee rates as a 6/10.    Past Medical History:   Diagnosis Date    ADD (attention deficit disorder with hyperactivity) 04/15/2013    GERD (gastroesophageal reflux disease)     Hyperlipidemia     Hypertension     Hypogonadism in male     Insomnia     Secondary polycythemia        Past Surgical History:   Procedure Laterality Date    ARTHROSCOPIC CHONDROPLASTY OF KNEE JOINT Right 10/10/2022    Procedure: ARTHROSCOPY, KNEE, WITH CHONDROPLASTY;  Surgeon: Herbie Hoyt MD;  Location: [facility] OR;  Service: Orthopedics;  Laterality: Right;    COLONOSCOPY N/A 11/06/2018    Dr. Chadwick; unremarkable; repeat in 10 years    EXCISION OF MEDIAL MENISCUS OF KNEE Right 10/10/2022    Procedure: MENISCECTOMY, KNEE, MEDIAL;  Surgeon: Herbie Hoyt MD;  Location: [facility] OR;  Service: Orthopedics;  Laterality: Right;    HERNIA REPAIR  2000    L knee scope      L shoulder surgery      VASECTOMY         Current Outpatient Medications on File Prior to Visit   Medication Sig Dispense Refill    AMBIEN 10 mg Tab Take 10 mg by mouth every evening.      dextroamphetamine-amphetamine (AMPHETAMINE SALT COMBO) 30 mg Tab Take 1 tablet by mouth 3 (three) times daily.       ketoconazole (NIZORAL) 2 % cream Apply topically 2 (two) times daily. 60 g 2    sulfamethoxazole-trimethoprim 800-160mg (BACTRIM DS) 800-160 mg Tab Take 1 tablet by mouth 2 (two) times daily. 20 tablet 0    telmisartan-hydrochlorothiazide (MICARDIS HCT) 40-12.5 mg per tablet Take 1 tablet by mouth once daily. 90 tablet 3    testosterone cypionate (DEPOTESTOTERONE CYPIONATE) 200 mg/mL injection INJECT 1 ML (200MG) INTRAMUSCULARLY EVERY 14 DAYS 2 mL 0      No current facility-administered medications on file prior to visit.       ROS:    Constitution: Denies chills, fever, and sweats.  HENT: Denies headaches or blurry vision.  Cardiovascular: Denies chest pain or irregular heart beat.  Respiratory: Denies cough or shortness of breath.  Gastrointestinal: Denies abdominal pain, nausea, or vomiting.  Genitourinary:  Denies urinary incontinence, bladder and kidney issues  Musculoskeletal:  Denies muscle cramps.  Neurological: Denies dizziness or focal weakness.  Psychiatric/Behavioral: Normal mental status.  Hematologic/Lymphatic: Denies bleeding problem or easy bruising/bleeding.  Skin: Denies rash or suspicious lesions.    Physical examination     Gen - No acute distress, well nourished, well groomed   Eyes - Extraoccular motions intact, pupils equally round and reactive to light and accommodation   ENT - normocephalic, atruamtic, oropharynx clear   Neck - Supple, no abnormal masses   Cardiovascular - regular rate and rhythm   Pulmonary - clear to auscultation bilaterally, no wheezes, ronchi, or rales   Abdomen - soft, non-tender, non-distended, positive bowel sounds   Psych - The patient is alert and oriented x3 with normal mood and affect    Examination of the Right Lower Extremity:     Skin intact throughout.  Motor function is intact distally EHL/FHL/TA/casper   +2 dorsalis pedis and posterior tibial pulses   Sensation to light touch intact distally dorsal, plantar, and first web space     Examination of the Right knee:    ROM 0 - 150   Effusion negative  Tenderness to palpation at the joint line positive  Pain during range of motion positive, especially with full extension  Crepitation during range of motion negative     negative increased pain noted with flexion past 90   negative antalgic gait noted   negative Lachman's Test   negative Anterior Drawer Test   negative Posterior Drawer Test   negative McMurrays Test   negative Disco Test   negative Varus/Valgus  instability    X-ray images were examined and personally interpreted by me.  Two views the right knee dated 09/11/2023 show some mild arthritis in the medial compartment with narrowing of the joint space and subchondral sclerosis.    Dx:  Right knee pain    Plan:  I did offer the patient an injection and he agreed.  We injected the right knee with a mixture of 2, 2, 1.  He tolerated that well.  Follow up in 2 weeks for re-evaluation.

## 2023-09-11 NOTE — PROCEDURES
Large Joint Aspiration/Injection: R knee    Date/Time: 9/11/2023 1:15 PM    Performed by: Navarro Lockhart II, MD  Authorized by: Navarro Lockhart II, MD    Consent Done?:  Yes (Verbal)  Indications:  Arthritis and pain  Timeout: prior to procedure the correct patient, procedure, and site was verified      Local anesthesia used?: Yes    Local anesthetic:  Topical anesthetic    Details:  Needle Size:  22 G  Approach:  Anteromedial  Location:  Knee  Site:  R knee  Medications:  80 mg methylPREDNISolone acetate 80 mg/mL  Patient tolerance:  Patient tolerated the procedure well with no immediate complications

## 2023-09-17 DIAGNOSIS — I10 HYPERTENSION, UNSPECIFIED TYPE: ICD-10-CM

## 2023-09-17 NOTE — TELEPHONE ENCOUNTER
No care due was identified.  [facility] Embedded Care Due Messages. Reference number: [id number].   9/17/2023 3:38:08 AM CDT

## 2023-09-18 RX ORDER — TELMISARTAN AND HYDROCHLORTHIAZIDE 40; 12.5 MG/1; MG/1
1 TABLET ORAL DAILY
Qty: 90 TABLET | Refills: 3 | Status: SHIPPED | OUTPATIENT
Start: 2023-09-18 | End: 2023-11-13

## 2023-09-18 NOTE — TELEPHONE ENCOUNTER
Refill Decision Note   David Aguilera  is requesting a refill authorization.  Brief Assessment and Rationale for Refill:  Approve     Medication Therapy Plan:       Medication Reconciliation Completed: No    Comments:     No Care Gaps recommended.     Note composed:12:36 PM 09/18/2023

## 2023-09-28 ENCOUNTER — OFFICE VISIT (OUTPATIENT)
Dept: ORTHOPEDICS | Facility: CLINIC | Age: 58
End: 2023-09-28
Payer: COMMERCIAL

## 2023-09-28 VITALS — BODY MASS INDEX: 33.05 KG/M2 | HEIGHT: 72 IN | RESPIRATION RATE: 17 BRPM | WEIGHT: 244 LBS

## 2023-09-28 DIAGNOSIS — M17.11 PRIMARY OSTEOARTHRITIS OF RIGHT KNEE: Primary | ICD-10-CM

## 2023-09-28 PROCEDURE — 1159F MED LIST DOCD IN RCRD: CPT | Mod: CPTII,S$GLB,, | Performed by: ORTHOPAEDIC SURGERY

## 2023-09-28 PROCEDURE — 3044F PR MOST RECENT HEMOGLOBIN A1C LEVEL <7.0%: ICD-10-PCS | Mod: CPTII,S$GLB,, | Performed by: ORTHOPAEDIC SURGERY

## 2023-09-28 PROCEDURE — 99999 PR PBB SHADOW E&M-EST. PATIENT-LVL III: CPT | Mod: PBBFAC,,, | Performed by: ORTHOPAEDIC SURGERY

## 2023-09-28 PROCEDURE — 1160F RVW MEDS BY RX/DR IN RCRD: CPT | Mod: CPTII,S$GLB,, | Performed by: ORTHOPAEDIC SURGERY

## 2023-09-28 PROCEDURE — 99999 PR PBB SHADOW E&M-EST. PATIENT-LVL III: ICD-10-PCS | Mod: PBBFAC,,, | Performed by: ORTHOPAEDIC SURGERY

## 2023-09-28 PROCEDURE — 3008F PR BODY MASS INDEX (BMI) DOCUMENTED: ICD-10-PCS | Mod: CPTII,S$GLB,, | Performed by: ORTHOPAEDIC SURGERY

## 2023-09-28 PROCEDURE — 1160F PR REVIEW ALL MEDS BY PRESCRIBER/CLIN PHARMACIST DOCUMENTED: ICD-10-PCS | Mod: CPTII,S$GLB,, | Performed by: ORTHOPAEDIC SURGERY

## 2023-09-28 PROCEDURE — 3044F HG A1C LEVEL LT 7.0%: CPT | Mod: CPTII,S$GLB,, | Performed by: ORTHOPAEDIC SURGERY

## 2023-09-28 PROCEDURE — 3008F BODY MASS INDEX DOCD: CPT | Mod: CPTII,S$GLB,, | Performed by: ORTHOPAEDIC SURGERY

## 2023-09-28 PROCEDURE — 1159F PR MEDICATION LIST DOCUMENTED IN MEDICAL RECORD: ICD-10-PCS | Mod: CPTII,S$GLB,, | Performed by: ORTHOPAEDIC SURGERY

## 2023-09-28 PROCEDURE — 99214 OFFICE O/P EST MOD 30 MIN: CPT | Mod: S$GLB,,, | Performed by: ORTHOPAEDIC SURGERY

## 2023-09-28 PROCEDURE — 99214 PR OFFICE/OUTPT VISIT, EST, LEVL IV, 30-39 MIN: ICD-10-PCS | Mod: S$GLB,,, | Performed by: ORTHOPAEDIC SURGERY

## 2023-09-28 RX ORDER — MELOXICAM 15 MG/1
15 TABLET ORAL DAILY
Qty: 30 TABLET | Refills: 11 | Status: SHIPPED | OUTPATIENT
Start: 2023-09-28 | End: 2023-11-13

## 2023-09-28 NOTE — PROGRESS NOTES
CC:  57-year-old male follows up with his right knee.  Almost a year ago underwent a right knee arthroscopy with a meniscectomy on the medial side along with a chondroplasty.  At the time of surgery arthritic changes in the medial compartment were noted.  He got some relief with that initially but then his pain returned.  On his last visit with me we tried intra-articular steroid injection and he got some but not complete relief with that.  He states he has been taking NSAIDs over-the-counter but continues to have pain.  He is very active and wants to continue to remain active.  He has pain with basic daily activities but he also recently has developed pain at night.    Past Medical History:   Diagnosis Date    ADD (attention deficit disorder with hyperactivity) 04/15/2013    GERD (gastroesophageal reflux disease)     Hyperlipidemia     Hypertension     Hypogonadism in male     Insomnia     Secondary polycythemia        Past Surgical History:   Procedure Laterality Date    ARTHROSCOPIC CHONDROPLASTY OF KNEE JOINT Right 10/10/2022    Procedure: ARTHROSCOPY, KNEE, WITH CHONDROPLASTY;  Surgeon: Herbie Hoyt MD;  Location: [facility] OR;  Service: Orthopedics;  Laterality: Right;    COLONOSCOPY N/A 11/06/2018    Dr. Chadwick; unremarkable; repeat in 10 years    EXCISION OF MEDIAL MENISCUS OF KNEE Right 10/10/2022    Procedure: MENISCECTOMY, KNEE, MEDIAL;  Surgeon: Herbie Hoyt MD;  Location: [facility] OR;  Service: Orthopedics;  Laterality: Right;    HERNIA REPAIR  2000    L knee scope      L shoulder surgery      VASECTOMY         Current Outpatient Medications on File Prior to Visit   Medication Sig Dispense Refill    AMBIEN 10 mg Tab Take 10 mg by mouth every evening.      dextroamphetamine-amphetamine (AMPHETAMINE SALT COMBO) 30 mg Tab Take 1 tablet by mouth 3 (three) times daily.       ketoconazole (NIZORAL) 2 % cream Apply topically 2 (two) times daily. 60 g 2    sulfamethoxazole-trimethoprim 800-160mg  (BACTRIM DS) 800-160 mg Tab Take 1 tablet by mouth 2 (two) times daily. 20 tablet 0    telmisartan-hydrochlorothiazide (MICARDIS HCT) 40-12.5 mg per tablet Take 1 tablet by mouth once daily. 90 tablet 3    testosterone cypionate (DEPOTESTOTERONE CYPIONATE) 200 mg/mL injection INJECT 1 ML (200MG) INTRAMUSCULARLY EVERY 14 DAYS 2 mL 0     No current facility-administered medications on file prior to visit.       ROS:    Constitution: Denies chills, fever, and sweats.  HENT: Denies headaches or blurry vision.  Cardiovascular: Denies chest pain or irregular heart beat.  Respiratory: Denies cough or shortness of breath.  Gastrointestinal: Denies abdominal pain, nausea, or vomiting.  Genitourinary:  Denies urinary incontinence, bladder and kidney issues  Musculoskeletal:  Denies muscle cramps.  Positive for right knee pain  Neurological: Denies dizziness or focal weakness.  Psychiatric/Behavioral: Normal mental status.  Hematologic/Lymphatic: Denies bleeding problem or easy bruising/bleeding.  Skin: Denies rash or suspicious lesions.    Physical examination     Gen - No acute distress, well nourished, well groomed   Eyes - Extraoccular motions intact, pupils equally round and reactive to light and accommodation   ENT - normocephalic, atruamtic, oropharynx clear   Neck - Supple, no abnormal masses   Cardiovascular - regular rate and rhythm   Pulmonary - clear to auscultation bilaterally, no wheezes, ronchi, or rales   Abdomen - soft, non-tender, non-distended, positive bowel sounds   Psych - The patient is alert and oriented x3 with normal mood and affect    Examination of the Right Lower Extremity:     Skin intact throughout.  Motor function is intact distally EHL/FHL/TA/casper   +2 dorsalis pedis and posterior tibial pulses   Sensation to light touch intact distally dorsal, plantar, and first web space     Examination of the Right knee:    ROM 0 - 150   Effusion negative  Tenderness to palpation at the joint line  positive  Pain during range of motion positive  Crepitation during range of motion positive     positive increased pain noted with flexion past 90   negative antalgic gait noted   negative Lachman's Test   negative Anterior Drawer Test   negative Posterior Drawer Test   negative McMurrays Test   negative Disco Test   negative Varus/Valgus instability    X-ray images were examined and personally interpreted by me.  Three views the right knee dated 09/11/2023 were once again reviewed.  There is narrowing of the joint space and early subchondral sclerosis mostly in the medial compartment indicative of osteoarthritis.    Dx:  Osteoarthritis right knee    Plan:  We discussed different treatment options.  I am going to start the patient on a once a day arthritis medication since the over-the-counter medications are not really working well for him.  He would also like to try viscosupplementation since the steroid injection did not really give him full relief.  We will have him follow up in 2 weeks to see if the NSAIDs are helping and to possibly start viscosupplementation.

## 2023-10-12 ENCOUNTER — OFFICE VISIT (OUTPATIENT)
Dept: ORTHOPEDICS | Facility: CLINIC | Age: 58
End: 2023-10-12
Payer: COMMERCIAL

## 2023-10-12 DIAGNOSIS — M25.562 CHRONIC PAIN OF LEFT KNEE: Primary | ICD-10-CM

## 2023-10-12 DIAGNOSIS — S83.242A TEAR OF MEDIAL MENISCUS OF LEFT KNEE, CURRENT, INITIAL ENCOUNTER: ICD-10-CM

## 2023-10-12 DIAGNOSIS — G89.29 CHRONIC PAIN OF LEFT KNEE: Primary | ICD-10-CM

## 2023-10-12 DIAGNOSIS — M17.11 PRIMARY OSTEOARTHRITIS OF RIGHT KNEE: ICD-10-CM

## 2023-10-12 PROCEDURE — 99214 PR OFFICE/OUTPT VISIT, EST, LEVL IV, 30-39 MIN: ICD-10-PCS | Mod: 25,S$GLB,, | Performed by: ORTHOPAEDIC SURGERY

## 2023-10-12 PROCEDURE — 20610 LARGE JOINT ASPIRATION/INJECTION: R KNEE: ICD-10-PCS | Mod: RT,S$GLB,, | Performed by: ORTHOPAEDIC SURGERY

## 2023-10-12 PROCEDURE — 99214 OFFICE O/P EST MOD 30 MIN: CPT | Mod: 25,S$GLB,, | Performed by: ORTHOPAEDIC SURGERY

## 2023-10-12 PROCEDURE — 20610 DRAIN/INJ JOINT/BURSA W/O US: CPT | Mod: RT,S$GLB,, | Performed by: ORTHOPAEDIC SURGERY

## 2023-10-12 PROCEDURE — 99999 PR PBB SHADOW E&M-EST. PATIENT-LVL III: CPT | Mod: PBBFAC,,, | Performed by: ORTHOPAEDIC SURGERY

## 2023-10-12 PROCEDURE — 99999 PR PBB SHADOW E&M-EST. PATIENT-LVL III: ICD-10-PCS | Mod: PBBFAC,,, | Performed by: ORTHOPAEDIC SURGERY

## 2023-10-12 PROCEDURE — 3044F PR MOST RECENT HEMOGLOBIN A1C LEVEL <7.0%: ICD-10-PCS | Mod: CPTII,S$GLB,, | Performed by: ORTHOPAEDIC SURGERY

## 2023-10-12 PROCEDURE — 1159F PR MEDICATION LIST DOCUMENTED IN MEDICAL RECORD: ICD-10-PCS | Mod: CPTII,S$GLB,, | Performed by: ORTHOPAEDIC SURGERY

## 2023-10-12 PROCEDURE — 3044F HG A1C LEVEL LT 7.0%: CPT | Mod: CPTII,S$GLB,, | Performed by: ORTHOPAEDIC SURGERY

## 2023-10-12 PROCEDURE — 1160F RVW MEDS BY RX/DR IN RCRD: CPT | Mod: CPTII,S$GLB,, | Performed by: ORTHOPAEDIC SURGERY

## 2023-10-12 PROCEDURE — 1159F MED LIST DOCD IN RCRD: CPT | Mod: CPTII,S$GLB,, | Performed by: ORTHOPAEDIC SURGERY

## 2023-10-12 PROCEDURE — 1160F PR REVIEW ALL MEDS BY PRESCRIBER/CLIN PHARMACIST DOCUMENTED: ICD-10-PCS | Mod: CPTII,S$GLB,, | Performed by: ORTHOPAEDIC SURGERY

## 2023-10-12 NOTE — PROCEDURES
Large Joint Aspiration/Injection: R knee    Date/Time: 10/12/2023 10:00 AM    Performed by: Navarro Lockhart II, MD  Authorized by: Navarro Lockhart II, MD    Consent Done?:  Yes (Verbal)  Indications:  Arthritis and pain  Timeout: prior to procedure the correct patient, procedure, and site was verified      Local anesthesia used?: Yes    Local anesthetic:  Topical anesthetic    Details:  Needle Size:  18 G  Approach:  Anteromedial  Location:  Knee  Site:  R knee  Medications:  48 mg hylan g-f 20 48 mg/6 mL  Patient tolerance:  Patient tolerated the procedure well with no immediate complications

## 2023-10-12 NOTE — PROGRESS NOTES
CC:  57-year-old male follows up with osteoarthritis of the right knee.  The patient fell through some decking a couple of months ago and has had increased pain since that time.  We tried intra-articular steroid injections and NSAIDs without complete relief.  In his last visit he wanted to know if there were any other options and we would discussed viscosupplementation.    The patient also reports pain, popping, and locking of the left knee.  He states this has been going on for about a year but the previous orthopedist he was seeing only wanted to address 1 problem at a time.  He states he is tried p.o. medications including NSAIDs and Tylenol.  He is tried activity modification all without relief.  He reports pain and popping and locking on a daily basis.  He reports the left knee pain as a 3/10.    Past Medical History:   Diagnosis Date    ADD (attention deficit disorder with hyperactivity) 04/15/2013    GERD (gastroesophageal reflux disease)     Hyperlipidemia     Hypertension     Hypogonadism in male     Insomnia     Secondary polycythemia        Past Surgical History:   Procedure Laterality Date    ARTHROSCOPIC CHONDROPLASTY OF KNEE JOINT Right 10/10/2022    Procedure: ARTHROSCOPY, KNEE, WITH CHONDROPLASTY;  Surgeon: Herbie Hoyt MD;  Location: [facility] OR;  Service: Orthopedics;  Laterality: Right;    COLONOSCOPY N/A 11/06/2018    Dr. Chadwick; unremarkable; repeat in 10 years    EXCISION OF MEDIAL MENISCUS OF KNEE Right 10/10/2022    Procedure: MENISCECTOMY, KNEE, MEDIAL;  Surgeon: Herbie Hoyt MD;  Location: [facility] OR;  Service: Orthopedics;  Laterality: Right;    HERNIA REPAIR  2000    L knee scope      L shoulder surgery      VASECTOMY         Current Outpatient Medications on File Prior to Visit   Medication Sig Dispense Refill    AMBIEN 10 mg Tab Take 10 mg by mouth every evening.      dextroamphetamine-amphetamine (AMPHETAMINE SALT COMBO) 30 mg Tab Take 1 tablet by mouth 3 (three) times  daily.       ketoconazole (NIZORAL) 2 % cream Apply topically 2 (two) times daily. 60 g 2    meloxicam (MOBIC) 15 MG tablet Take 1 tablet (15 mg total) by mouth once daily. 30 tablet 11    sulfamethoxazole-trimethoprim 800-160mg (BACTRIM DS) 800-160 mg Tab Take 1 tablet by mouth 2 (two) times daily. 20 tablet 0    telmisartan-hydrochlorothiazide (MICARDIS HCT) 40-12.5 mg per tablet Take 1 tablet by mouth once daily. 90 tablet 3    testosterone cypionate (DEPOTESTOTERONE CYPIONATE) 200 mg/mL injection INJECT 1 ML (200MG) INTRAMUSCULARLY EVERY 14 DAYS 2 mL 0     No current facility-administered medications on file prior to visit.       ROS:    Constitution: Denies chills, fever, and sweats.  HENT: Denies headaches or blurry vision.  Cardiovascular: Denies chest pain or irregular heart beat.  Respiratory: Denies cough or shortness of breath.  Gastrointestinal: Denies abdominal pain, nausea, or vomiting.  Genitourinary:  Denies urinary incontinence, bladder and kidney issues  Musculoskeletal:  Denies muscle cramps.  Positive for pain in the right knee and pain and mechanical symptoms in the left knee.  Neurological: Denies dizziness or focal weakness.  Psychiatric/Behavioral: Normal mental status.  Hematologic/Lymphatic: Denies bleeding problem or easy bruising/bleeding.  Skin: Denies rash or suspicious lesions.    Physical examination     Gen - No acute distress, well nourished, well groomed   Eyes - Extraoccular motions intact, pupils equally round and reactive to light and accommodation   ENT - normocephalic, atruamtic, oropharynx clear   Neck - Supple, no abnormal masses   Cardiovascular - regular rate and rhythm   Pulmonary - clear to auscultation bilaterally, no wheezes, ronchi, or rales   Abdomen - soft, non-tender, non-distended, positive bowel sounds   Psych - The patient is alert and oriented x3 with normal mood and affect    Examination of the Right Lower Extremity:     Skin intact throughout.  Motor function  is intact distally EHL/FHL/TA/casper   +2 dorsalis pedis and posterior tibial pulses   Sensation to light touch intact distally dorsal, plantar, and first web space     Examination of the Right knee:    ROM 0 - 150   Effusion positive  Tenderness to palpation at the joint line positive  Pain during range of motion positive  Crepitation during range of motion positive     positive increased pain noted with flexion past 90   positive antalgic gait noted   negative Lachman's Test   negative Anterior Drawer Test   negative Posterior Drawer Test   negative McMurrays Test   negative Disco Test   negative Varus/Valgus instability    Examination of the Left Lower Extremity:     Skin intact throughout.  Motor function is intact distally EHL/FHL/TA/casper   +2 dorsalis pedis and posterior tibial pulses   Sensation to light touch intact distally dorsal, plantar, and first web space     Examination of the Left knee:    ROM 0 - 150   Effusion negative  Tenderness to palpation at the joint line positive  Pain during range of motion negative  Crepitation during range of motion negative     positive increased pain noted with flexion past 90   negative antalgic gait noted   negative Lachman's Test   negative Anterior Drawer Test   negative Posterior Drawer Test   positive McMurrays Test   positive Disco Test   negative Varus/Valgus instability      X-ray images were examined and personally interpreted by me.  Three views of the right knee dated 09/11/2023 were once again reviewed.  There is some mild arthritic changes involving mostly the medial compartment of his right knee with some narrowing of the joint space and early subchondral sclerosis.  On the left side the joint space appears well-maintained with no advanced arthritic changes and no acute fractures.    Dx:  Osteoarthritis right knee that is failed p.o. NSAIDs and steroid injection.  Pain and mechanical symptoms in the left knee for over a year with no improvement.    Plan:   We did manage to get viscosupplementation approved by the patient's insurance company.  We injected the right knee with 6 mL of Synvisc-One.      With regards to the left knee we will order an MRI to evaluate for meniscal tearing.  Follow up after MRI for further evaluation.

## 2023-10-30 ENCOUNTER — HOSPITAL ENCOUNTER (OUTPATIENT)
Dept: RADIOLOGY | Facility: HOSPITAL | Age: 58
Discharge: HOME | End: 2023-10-30
Attending: ORTHOPAEDIC SURGERY
Payer: COMMERCIAL

## 2023-10-30 DIAGNOSIS — M25.562 CHRONIC PAIN OF LEFT KNEE: ICD-10-CM

## 2023-10-30 DIAGNOSIS — G89.29 CHRONIC PAIN OF LEFT KNEE: ICD-10-CM

## 2023-10-30 PROCEDURE — 73721 MRI KNEE WITHOUT CONTRAST LEFT: ICD-10-PCS | Mod: 26,LT,, | Performed by: RADIOLOGY

## 2023-10-30 PROCEDURE — 73721 MRI JNT OF LWR EXTRE W/O DYE: CPT | Mod: TC,LT

## 2023-10-30 PROCEDURE — 73721 MRI JNT OF LWR EXTRE W/O DYE: CPT | Mod: 26,LT,, | Performed by: RADIOLOGY

## 2023-10-31 ENCOUNTER — PATIENT MESSAGE (OUTPATIENT)
Dept: ORTHOPEDICS | Facility: CLINIC | Age: 58
End: 2023-10-31

## 2023-10-31 ENCOUNTER — OFFICE VISIT (OUTPATIENT)
Dept: ORTHOPEDICS | Facility: CLINIC | Age: 58
End: 2023-10-31
Payer: COMMERCIAL

## 2023-10-31 ENCOUNTER — TELEPHONE (OUTPATIENT)
Dept: FAMILY MEDICINE | Facility: CLINIC | Age: 58
End: 2023-10-31
Payer: COMMERCIAL

## 2023-10-31 VITALS — BODY MASS INDEX: 33.06 KG/M2 | HEIGHT: 72 IN | WEIGHT: 244.06 LBS

## 2023-10-31 DIAGNOSIS — M17.11 UNILATERAL PRIMARY OSTEOARTHRITIS, RIGHT KNEE: Primary | ICD-10-CM

## 2023-10-31 DIAGNOSIS — S83.282A TEAR OF LATERAL MENISCUS OF LEFT KNEE, CURRENT, INITIAL ENCOUNTER: ICD-10-CM

## 2023-10-31 DIAGNOSIS — Z01.818 PREOP TESTING: ICD-10-CM

## 2023-10-31 PROCEDURE — 1160F RVW MEDS BY RX/DR IN RCRD: CPT | Mod: CPTII,S$GLB,, | Performed by: ORTHOPAEDIC SURGERY

## 2023-10-31 PROCEDURE — 1159F PR MEDICATION LIST DOCUMENTED IN MEDICAL RECORD: ICD-10-PCS | Mod: CPTII,S$GLB,, | Performed by: ORTHOPAEDIC SURGERY

## 2023-10-31 PROCEDURE — 1159F MED LIST DOCD IN RCRD: CPT | Mod: CPTII,S$GLB,, | Performed by: ORTHOPAEDIC SURGERY

## 2023-10-31 PROCEDURE — 3008F PR BODY MASS INDEX (BMI) DOCUMENTED: ICD-10-PCS | Mod: CPTII,S$GLB,, | Performed by: ORTHOPAEDIC SURGERY

## 2023-10-31 PROCEDURE — 99215 OFFICE O/P EST HI 40 MIN: CPT | Mod: S$GLB,,, | Performed by: ORTHOPAEDIC SURGERY

## 2023-10-31 PROCEDURE — 99999 PR PBB SHADOW E&M-EST. PATIENT-LVL III: CPT | Mod: PBBFAC,,, | Performed by: ORTHOPAEDIC SURGERY

## 2023-10-31 PROCEDURE — 99215 PR OFFICE/OUTPT VISIT, EST, LEVL V, 40-54 MIN: ICD-10-PCS | Mod: S$GLB,,, | Performed by: ORTHOPAEDIC SURGERY

## 2023-10-31 PROCEDURE — 99999 PR PBB SHADOW E&M-EST. PATIENT-LVL III: ICD-10-PCS | Mod: PBBFAC,,, | Performed by: ORTHOPAEDIC SURGERY

## 2023-10-31 PROCEDURE — 3044F HG A1C LEVEL LT 7.0%: CPT | Mod: CPTII,S$GLB,, | Performed by: ORTHOPAEDIC SURGERY

## 2023-10-31 PROCEDURE — 3044F PR MOST RECENT HEMOGLOBIN A1C LEVEL <7.0%: ICD-10-PCS | Mod: CPTII,S$GLB,, | Performed by: ORTHOPAEDIC SURGERY

## 2023-10-31 PROCEDURE — 1160F PR REVIEW ALL MEDS BY PRESCRIBER/CLIN PHARMACIST DOCUMENTED: ICD-10-PCS | Mod: CPTII,S$GLB,, | Performed by: ORTHOPAEDIC SURGERY

## 2023-10-31 PROCEDURE — 3008F BODY MASS INDEX DOCD: CPT | Mod: CPTII,S$GLB,, | Performed by: ORTHOPAEDIC SURGERY

## 2023-10-31 RX ORDER — MUPIROCIN 20 MG/G
OINTMENT TOPICAL
Status: CANCELLED | OUTPATIENT
Start: 2023-10-31

## 2023-10-31 NOTE — LETTER
Surgical Clearance Form    PLEASE PROVIDE ALL INFORMATION      Date : 10/31/2023  Dr. Woody,    Please provide us with WRITTEN Cardiac Clearance on Mr/ Mrs/ MsBrock Aguilera.  : 1965.                                                  Patient will be scheduled for a Right Total Knee Arthroplasty under choice anesthesia scheduled for 11/15/2023 with Navarro Lockhart II, MD.      Is it in your medical opinion that patient is medically fit to undergo surgical procedure at this time?      ____ YES    ____ NO      PLEASE FAX THIS CLEARANCE WITH TEST RESULTS -[phone].  Thank you for your help in this matter.    Sincerely,      MD Gilma Gaytan IIra Ryan  Phone  [phone]  Fax- [phone]

## 2023-10-31 NOTE — H&P (VIEW-ONLY)
CC:  58-year-old male follows up with his left knee pain.  He would an MRI done that is now available for review.    She also continues to complain of severe right knee pain.  He has osteoarthritis of the right knee.  We have tried p.o. medications and intra-articular injections without relief.  He currently rates his right knee pain as a 6/10.  He is having pain with basic daily activities and pain keeps him up at night.    Past Medical History:   Diagnosis Date    ADD (attention deficit disorder with hyperactivity) 04/15/2013    GERD (gastroesophageal reflux disease)     Hyperlipidemia     Hypertension     Hypogonadism in male     Insomnia     Secondary polycythemia        Past Surgical History:   Procedure Laterality Date    ARTHROSCOPIC CHONDROPLASTY OF KNEE JOINT Right 10/10/2022    Procedure: ARTHROSCOPY, KNEE, WITH CHONDROPLASTY;  Surgeon: Herbie Hoyt MD;  Location: [facility] OR;  Service: Orthopedics;  Laterality: Right;    COLONOSCOPY N/A 11/06/2018    Dr. Chadwick; unremarkable; repeat in 10 years    EXCISION OF MEDIAL MENISCUS OF KNEE Right 10/10/2022    Procedure: MENISCECTOMY, KNEE, MEDIAL;  Surgeon: Herbie Hoyt MD;  Location: [facility] OR;  Service: Orthopedics;  Laterality: Right;    HERNIA REPAIR  2000    L knee scope      L shoulder surgery      VASECTOMY         Current Outpatient Medications on File Prior to Visit   Medication Sig Dispense Refill    AMBIEN 10 mg Tab Take 10 mg by mouth every evening.      dextroamphetamine-amphetamine (AMPHETAMINE SALT COMBO) 30 mg Tab Take 1 tablet by mouth 3 (three) times daily.       ketoconazole (NIZORAL) 2 % cream Apply topically 2 (two) times daily. 60 g 2    meloxicam (MOBIC) 15 MG tablet Take 1 tablet (15 mg total) by mouth once daily. 30 tablet 11    sulfamethoxazole-trimethoprim 800-160mg (BACTRIM DS) 800-160 mg Tab Take 1 tablet by mouth 2 (two) times daily. 20 tablet 0    telmisartan-hydrochlorothiazide (MICARDIS HCT) 40-12.5 mg per tablet Take  1 tablet by mouth once daily. 90 tablet 3    testosterone cypionate (DEPOTESTOTERONE CYPIONATE) 200 mg/mL injection INJECT 1 ML (200MG) INTRAMUSCULARLY EVERY 14 DAYS 2 mL 0     No current facility-administered medications on file prior to visit.       ROS:    Constitution: Denies chills, fever, and sweats.  HENT: Denies headaches or blurry vision.  Cardiovascular: Denies chest pain or irregular heart beat.  Respiratory: Denies cough or shortness of breath.  Gastrointestinal: Denies abdominal pain, nausea, or vomiting.  Genitourinary:  Denies urinary incontinence, bladder and kidney issues  Musculoskeletal:  Denies muscle cramps.  Positive for bilateral knee pain right worse than left  Neurological: Denies dizziness or focal weakness.  Psychiatric/Behavioral: Normal mental status.  Hematologic/Lymphatic: Denies bleeding problem or easy bruising/bleeding.  Skin: Denies rash or suspicious lesions.    Physical examination     Gen - No acute distress, well nourished, well groomed   Eyes - Extraoccular motions intact, pupils equally round and reactive to light and accommodation   ENT - normocephalic, atruamtic, oropharynx clear   Neck - Supple, no abnormal masses   Cardiovascular - regular rate and rhythm   Pulmonary - clear to auscultation bilaterally, no wheezes, ronchi, or rales   Abdomen - soft, non-tender, non-distended, positive bowel sounds   Psych - The patient is alert and oriented x3 with normal mood and affect    Examination of the Right Lower Extremity:     Skin intact throughout.  Motor function is intact distally EHL/FHL/TA/casper   +2 dorsalis pedis and posterior tibial pulses   Sensation to light touch intact distally dorsal, plantar, and first web space     Examination of the Right knee:    ROM 0 - 150   Effusion positive  Tenderness to palpation at the joint line positive  Pain during range of motion positive  Crepitation during range of motion positive     positive increased pain noted with flexion past  90   positive antalgic gait noted   negative Lachman's Test   negative Anterior Drawer Test   negative Posterior Drawer Test   negative McMurrays Test   negative Disco Test   negative Varus/Valgus instability    Examination of the Left Lower Extremity:     Skin intact throughout.  Motor function is intact distally EHL/FHL/TA/casper   +2 dorsalis pedis and posterior tibial pulses   Sensation to light touch intact distally dorsal, plantar, and first web space     Examination of the Left knee:    ROM 0 - 150   Effusion negative  Tenderness to palpation at the joint line positive  Pain during range of motion positive  Crepitation during range of motion negative     positive increased pain noted with flexion past 90   negative antalgic gait noted   negative Lachman's Test   negative Anterior Drawer Test   negative Posterior Drawer Test   positive McMurrays Test   positive Disco Test   negative Varus/Valgus instability      MRI images were examined and personally interpreted by me.  MRI of the left knee dated 10/30/2023 shows a tear in the lateral meniscus of the left knee.  We reviewed previous x-rays dated 09/02/2022 that show osteoarthritis of the right knee with narrowing of the joint space and subchondral sclerosis.    Dx:  Osteoarthritis right knee.  Tear of the lateral meniscus of the left knee.    Plan:  Potential morbidity to the right lower extremity with both operative and non operative treatments were discussed.  Recommendation is for right total knee arthroplasty.  Risks and benefits of the surgery were explained to the patient.  He verbalized understanding and does wish to proceed.  We will schedule that for the next available date that is convenient for him.

## 2023-10-31 NOTE — PROGRESS NOTES
CC:  58-year-old male follows up with his left knee pain.  He would an MRI done that is now available for review.    She also continues to complain of severe right knee pain.  He has osteoarthritis of the right knee.  We have tried p.o. medications and intra-articular injections without relief.  He currently rates his right knee pain as a 6/10.  He is having pain with basic daily activities and pain keeps him up at night.    Past Medical History:   Diagnosis Date    ADD (attention deficit disorder with hyperactivity) 04/15/2013    GERD (gastroesophageal reflux disease)     Hyperlipidemia     Hypertension     Hypogonadism in male     Insomnia     Secondary polycythemia        Past Surgical History:   Procedure Laterality Date    ARTHROSCOPIC CHONDROPLASTY OF KNEE JOINT Right 10/10/2022    Procedure: ARTHROSCOPY, KNEE, WITH CHONDROPLASTY;  Surgeon: Herbie Hoyt MD;  Location: [facility] OR;  Service: Orthopedics;  Laterality: Right;    COLONOSCOPY N/A 11/06/2018    Dr. Chadwick; unremarkable; repeat in 10 years    EXCISION OF MEDIAL MENISCUS OF KNEE Right 10/10/2022    Procedure: MENISCECTOMY, KNEE, MEDIAL;  Surgeon: Herbie Hoyt MD;  Location: [facility] OR;  Service: Orthopedics;  Laterality: Right;    HERNIA REPAIR  2000    L knee scope      L shoulder surgery      VASECTOMY         Current Outpatient Medications on File Prior to Visit   Medication Sig Dispense Refill    AMBIEN 10 mg Tab Take 10 mg by mouth every evening.      dextroamphetamine-amphetamine (AMPHETAMINE SALT COMBO) 30 mg Tab Take 1 tablet by mouth 3 (three) times daily.       ketoconazole (NIZORAL) 2 % cream Apply topically 2 (two) times daily. 60 g 2    meloxicam (MOBIC) 15 MG tablet Take 1 tablet (15 mg total) by mouth once daily. 30 tablet 11    sulfamethoxazole-trimethoprim 800-160mg (BACTRIM DS) 800-160 mg Tab Take 1 tablet by mouth 2 (two) times daily. 20 tablet 0    telmisartan-hydrochlorothiazide (MICARDIS HCT) 40-12.5 mg per tablet Take  1 tablet by mouth once daily. 90 tablet 3    testosterone cypionate (DEPOTESTOTERONE CYPIONATE) 200 mg/mL injection INJECT 1 ML (200MG) INTRAMUSCULARLY EVERY 14 DAYS 2 mL 0     No current facility-administered medications on file prior to visit.       ROS:    Constitution: Denies chills, fever, and sweats.  HENT: Denies headaches or blurry vision.  Cardiovascular: Denies chest pain or irregular heart beat.  Respiratory: Denies cough or shortness of breath.  Gastrointestinal: Denies abdominal pain, nausea, or vomiting.  Genitourinary:  Denies urinary incontinence, bladder and kidney issues  Musculoskeletal:  Denies muscle cramps.  Positive for bilateral knee pain right worse than left  Neurological: Denies dizziness or focal weakness.  Psychiatric/Behavioral: Normal mental status.  Hematologic/Lymphatic: Denies bleeding problem or easy bruising/bleeding.  Skin: Denies rash or suspicious lesions.    Physical examination     Gen - No acute distress, well nourished, well groomed   Eyes - Extraoccular motions intact, pupils equally round and reactive to light and accommodation   ENT - normocephalic, atruamtic, oropharynx clear   Neck - Supple, no abnormal masses   Cardiovascular - regular rate and rhythm   Pulmonary - clear to auscultation bilaterally, no wheezes, ronchi, or rales   Abdomen - soft, non-tender, non-distended, positive bowel sounds   Psych - The patient is alert and oriented x3 with normal mood and affect    Examination of the Right Lower Extremity:     Skin intact throughout.  Motor function is intact distally EHL/FHL/TA/casper   +2 dorsalis pedis and posterior tibial pulses   Sensation to light touch intact distally dorsal, plantar, and first web space     Examination of the Right knee:    ROM 0 - 150   Effusion positive  Tenderness to palpation at the joint line positive  Pain during range of motion positive  Crepitation during range of motion positive     positive increased pain noted with flexion past  90   positive antalgic gait noted   negative Lachman's Test   negative Anterior Drawer Test   negative Posterior Drawer Test   negative McMurrays Test   negative Disco Test   negative Varus/Valgus instability    Examination of the Left Lower Extremity:     Skin intact throughout.  Motor function is intact distally EHL/FHL/TA/casper   +2 dorsalis pedis and posterior tibial pulses   Sensation to light touch intact distally dorsal, plantar, and first web space     Examination of the Left knee:    ROM 0 - 150   Effusion negative  Tenderness to palpation at the joint line positive  Pain during range of motion positive  Crepitation during range of motion negative     positive increased pain noted with flexion past 90   negative antalgic gait noted   negative Lachman's Test   negative Anterior Drawer Test   negative Posterior Drawer Test   positive McMurrays Test   positive Disco Test   negative Varus/Valgus instability      MRI images were examined and personally interpreted by me.  MRI of the left knee dated 10/30/2023 shows a tear in the lateral meniscus of the left knee.  We reviewed previous x-rays dated 09/02/2022 that show osteoarthritis of the right knee with narrowing of the joint space and subchondral sclerosis.    Dx:  Osteoarthritis right knee.  Tear of the lateral meniscus of the left knee.    Plan:  Potential morbidity to the right lower extremity with both operative and non operative treatments were discussed.  Recommendation is for right total knee arthroplasty.  Risks and benefits of the surgery were explained to the patient.  He verbalized understanding and does wish to proceed.  We will schedule that for the next available date that is convenient for him.

## 2023-11-01 ENCOUNTER — HOSPITAL ENCOUNTER (OUTPATIENT)
Dept: PREADMISSION TESTING | Facility: HOSPITAL | Age: 58
Discharge: HOME | End: 2023-11-01
Attending: ORTHOPAEDIC SURGERY
Payer: COMMERCIAL

## 2023-11-01 ENCOUNTER — HOSPITAL ENCOUNTER (OUTPATIENT)
Dept: RADIOLOGY | Facility: HOSPITAL | Age: 58
Discharge: HOME | End: 2023-11-01
Attending: ORTHOPAEDIC SURGERY
Payer: COMMERCIAL

## 2023-11-01 DIAGNOSIS — Z01.818 PREOP TESTING: Primary | ICD-10-CM

## 2023-11-01 DIAGNOSIS — M17.11 UNILATERAL PRIMARY OSTEOARTHRITIS, RIGHT KNEE: ICD-10-CM

## 2023-11-01 DIAGNOSIS — M17.11 UNILATERAL PRIMARY OSTEOARTHRITIS, RIGHT KNEE: Primary | ICD-10-CM

## 2023-11-01 PROCEDURE — 93010 EKG 12-LEAD: ICD-10-PCS | Mod: ,,, | Performed by: GENERAL PRACTICE

## 2023-11-01 PROCEDURE — 71046 X-RAY EXAM CHEST 2 VIEWS: CPT | Mod: 26,,, | Performed by: RADIOLOGY

## 2023-11-01 PROCEDURE — 93010 ELECTROCARDIOGRAM REPORT: CPT | Mod: ,,, | Performed by: GENERAL PRACTICE

## 2023-11-01 PROCEDURE — 93005 ELECTROCARDIOGRAM TRACING: CPT

## 2023-11-01 PROCEDURE — 71046 X-RAY EXAM CHEST 2 VIEWS: CPT | Mod: TC

## 2023-11-01 PROCEDURE — 71046 XR CHEST PA AND LATERAL: ICD-10-PCS | Mod: 26,,, | Performed by: RADIOLOGY

## 2023-11-01 RX ORDER — ESCITALOPRAM OXALATE 10 MG/1
TABLET ORAL
COMMUNITY
Start: 2023-10-19 | End: 2023-11-13

## 2023-11-01 RX ORDER — ESCITALOPRAM OXALATE 20 MG/1
20 TABLET ORAL
COMMUNITY
Start: 2023-10-10

## 2023-11-01 NOTE — DISCHARGE INSTRUCTIONS
To confirm, Your doctor has instructed you that surgery is scheduled for: 11/15/23 with Dr. Lockhart    Please report to [facility], Registration the morning of surgery. You must check-in and receive a wristband before going to your procedure.  [address] [place] [address] [place] [address]    Pre-Op will call the afternoon prior to surgery between 1:00 and 6:00 PM with the final arrival time.  Phone number: [phone]    PLEASE NOTE:  The surgery schedule has many variables which may affect the time of your surgery case.  Family members should be available if your surgery time changes.  Plan to be here the day of your procedure between 4-6 hours.    MEDICATIONS:  TAKE ONLY THESE MEDICATIONS WITH A SMALL SIP OF WATER THE MORNING OF YOUR PROCEDURE:  See list      DO NOT TAKE THESE MEDICATIONS 5-7 DAYS PRIOR to your procedure or per your surgeon's request:   ASPIRIN, ALEVE, ADVIL, IBUPROFEN, FISH OIL VITAMIN E, HERBALS  (May take Tylenol)    ONLY if you are prescribed any types of blood thinners such as:  Aspirin, Coumadin, Plavix, Pradaxa, Xarelto, Aggrenox, Effient, Eliquis, Savasya, Brilinta, or any other, ask your surgeon whether you should stop taking them and how long before surgery you should stop.  You may also need to verify with the prescribing physician if it is ok to stop your medication.      INSTRUCTIONS IMPORTANT!!  Do not eat or drink anything between midnight and the time of your procedure- this includes gum, mints, and candy.  Do not smoke or drink alcoholic beverages 24 hours prior to your procedure.  Shower the night before AND the morning of your procedure with a Chlorhexidine wash such as Hibiclens or Dial antibacterial soap from the neck down.  Do not get it on your face or in your eyes.  You may use your own shampoo and face wash. This helps your skin to be as bacteria free as possible.    If you wear contact lenses, dentures, hearing aids or glasses, bring a container to put them  in during surgery and give to a family member for safe keeping.  Please leave all jewelry, piercing's and valuables at home. You must remove your false eyelashes prior to surgery.    DO NOT remove hair from the surgery site.  Do not shave the incision site unless you are given specific instructions to do so.    ONLY if you have been diagnosed with sleep apnea please bring your C-PAP machine.  ONLY if you wear home oxygen please bring your portable oxygen tank the day of your procedure.  ONLY if you have a history of OPEN HEART SURGERY you will need a clearance from your Cardiologist per Anesthesia.      ONLY for patients requiring bowel prep, written instructions will be given by your doctor's office.  ONLY if you have a neuro stimulator, please bring the controller with you the morning of surgery  ONLY if a type and screen test is needed before surgery, please return:  If your doctor has scheduled you for an overnight stay, bring a small overnight bag with any personal items you need.  Make arrangements in advance for transportation home by a responsible adult.  It is not safe to drive a vehicle during the 24 hours after anesthesia.          All  facilities and properties are tobacco free.  Smoking is NOT allowed.   If you have any questions about these instructions, call Pre-Op Admit  Nursing at [phone] or the Pre-Op Day Surgery Unit at [phone].

## 2023-11-01 NOTE — PRE ADMISSION SCREENING
[facility] Risk Assessment Scale    Patient Name: David Aguilera  YOB: 1965  MRN: [id number]            RIsk Factor Measure Recommendation Patient Data Scale/Score   BMI >40 Reconsider surgery, weight loss   Estimated body mass index is 33.1 kg/m² as calculated from the following:    Height as of 10/31/23: 6' (1.829 m).    Weight as of 10/31/23: 110.7 kg (244 lb 0.8 oz).   [] 0 = 1 - 24.9  [] 1 = 25-29.9  [x] 2 = 30-34.9  [] 3 = 35-39.9  [] 4 = 40-44.9  [] 5 = 45-99.9   Hemoglobin AIC (if applicable) >9 Delay surgery until DM under control  Refer for:  Nutrition Therapy  Exercise   Medication    Lab Results   Component Value Date    HGBA1C 4.8 08/03/2023       Lab Results   Component Value Date    GLU 76 11/01/2023      [x] 0 = 4.0-5.6  [] 1 = 5.7-6.4  [] 2 = 6.5-6.9  [] 3 = 7.0-7.9  [] 4 = 8.0-8.9  [] 5 = 9.0-12   Hemoglobin (Anemia) <9 Delay surgery   Correct anemia Lab Results   Component Value Date    HGB 17.3 11/01/2023    [] 20 - <9.0                    Albumin <3 Delay surgery &Workup Lab Results   Component Value Date    ALBUMIN 4.0 08/03/2023    [] 20 - <3.0   Smoking Cessation >4 Weeks Delay Surgery  Refer to OP Cessation Class    Never Smoker [] 20 - current smoker                                _____ PPD                    Hx of MI, PE, Arrhythmia, CVA, DVT <30 Days Delay Surgery    N/A [] 20      Infection Variable Delay surgery and re-evaluate   N/A [] 20 - recent/current infection     Depression (PHQ) >10 out of 27 Delay Surgery and re-evaluate  Medication  Counseling              [x] 0     []1     []2     []3      []4      [] 5                    (1-4)      (5-9)  (10-14)  (15-19)   (20-27)     Memory Impairment & Memory loss (Mini-Cog Screening Tool) Advanced dementia and/or Parkinson's Reconsider surgery     [x] 0     []1     []2     []3     []4     [] 5     Physical Conditioning (Modified AM-PAC Per Physical Therapy at [facility]) Unable to ambulate on day of surgery Delay  surgery and re-evaluate  Pre-Rehabilitation   (PT evaluation)       [x]  0   []4       []8     []12        []16     []20       (<20%)   (<40%)   (<60%)   (<80% )    (>80%)     Home Environment/Caregiver support  (Per /Navigator Interview)    Availability of basic services and/or approprate assistance during post-operative period Delay surgery and re-evaluate  Safe home environment  Health   1 week post-surgery  Transportation  availability  Ability to obtain DME/Medications post-op    [x] 0     []1     []2     []3     []4     [] 5  [x] 0     []1     []2     []3     []4     [] 5  [x] 0     []1     []2     []3     []4     [] 5  [x] 0     []1     []2     []3     []4     [] 5         MD Contact: Dr. Navarro Lockhart Comments:  Total Score:  2

## 2023-11-01 NOTE — PRE ADMISSION SCREENING
JOINT CAMP ASSESSMENT    Name David Aguilera   MRN [id number]    Age/Sex 58 y.o. male    Surgeon Dr. Navarro Lockhart II   Joint Camp Date 11/1/2023   Surgery Date 11/15/2023   Procedure Right Knee Arthroplasty   Insurance Payor: [place] CR[employer] BLUE [employer]IELD / Plan: BCBS ALL OUT OF STATE / Product Type: PPO /    Care Team Patient Care Team:  Vitaly Woody MD as PCP - General (Family Medicine)  Radha Mckenna MD as Consulting Physician (Hematology and Oncology)  Guadalupe Harris RN as Oncology Navigator (Hematology and Oncology)    Pharmacy   [id number] - [place], [place] - [address] AT [facility] [address]  [address]  [place] [address]  Phone: [phone] Fax: [phone]     AM-PAC Score   24   Risk Assessment Score 2     Past Medical History:   Diagnosis Date    ADD (attention deficit disorder with hyperactivity) 04/15/2013    GERD (gastroesophageal reflux disease)     Hyperlipidemia     Hypertension     Hypogonadism in male     Insomnia     Secondary polycythemia        Past Surgical History:   Procedure Laterality Date    ARTHROSCOPIC CHONDROPLASTY OF KNEE JOINT Right 10/10/2022    Procedure: ARTHROSCOPY, KNEE, WITH CHONDROPLASTY;  Surgeon: Herbie Hoyt MD;  Location: [facility] OR;  Service: Orthopedics;  Laterality: Right;    COLONOSCOPY N/A 11/06/2018    Dr. Chadwick; unremarkable; repeat in 10 years    EXCISION OF MEDIAL MENISCUS OF KNEE Right 10/10/2022    Procedure: MENISCECTOMY, KNEE, MEDIAL;  Surgeon: Herbie Hoyt MD;  Location: [facility] OR;  Service: Orthopedics;  Laterality: Right;    HERNIA REPAIR  2000    L knee scope      L shoulder surgery      VASECTOMY           Home Enviroment     Living Arrangement: Lives with spouse  Home Environment: 2-story house, number of outside stairs: 1, number of inside stairs: 14, bedroom on 1st floor, bathroom on 1st floor, walk-in shower  Home Safety Concerns: unremarkable    DISCHARGE CAREGIVER/SUPPORT SYSTEM     Identified  post-op caregiver: Patient has spouse / significant other.  Patient's caregiver(s) will be able to provide physical assistance. Patient will have someone to assist overnight.      Caregiver present at pre-op interview:  Yes      PRE-OPERATIVE FUNCTIONAL STATUS     Employment: Employed full time    Pre-op Functional Status: Patient is independent with mobility/ambulation, transfers, ADL's, IADL's.    Use of assistive device for ambulation: none  ADL: self care  ADL Limitations: difficulty with walking  Medical Restrictions: Unstable ambulation and Decreased range of motions in extremities    POTENTIAL BARRIERS TO DISCHARGE/POTENTIAL POST-OP COMPLICATIONS     Patient with hx of HTN, secondary polycythemia. POSSIBLE SAME DAY DISCHARGE.    DISCHARGE PLAN     Expected LOS of 1 days or less for joint replacement discussed with patient. We also discussed a discharge path of HH for approximately two weeks with a transition to outpatient PT on the third week given no post-op complications.      Patient in agreement with discharge plan: Yes    Discharge to: Discharge home with home health (PT/OT) x2 weeks with transition to outpatient PT     HH:  [facility] ([place], [place]). Patient disclosure form completed and sent to case management for upload to the medical record.      OP PT: [facility] ([facility])     Home DME: bedside commode    Needed DME at D/C: rolling walker     Rx: Per Dr. Navarro Lockhart at discharge     Meds to Beds: Yes  Patient expected to discharge on Aspirin 81mg by mouth twice daily for DVT prophylaxis.

## 2023-11-01 NOTE — PRE ADMISSION SCREENING
Patient Name: David Aguilera  YOB: 1965   MRN: [id number]     [facility]   Basic Mobility Inpatient Short Form 6 Clicks         How much difficulty does the patient currently have  Unable  A Lot  A Little  None      1. Turning over in bed (including adjusting bedclothes, sheets and blankets)?     1 []    2 []    3 []    4 [x]        2. Sitting down on and standing up from a chair with arms (e.g., wheelchair, bedside commode, etc.)     1 []  2 []  3 []     4 [x]      3. Moving from lying on back to sitting on the side of the bed?     1 []  2 []  3 []    4 [x]    How much help from another person does the patient currently need  Total  A Lot  A Little  None      4. Moving to and from a bed to a chair (including a wheelchair)?    1 []  2 []  3 []    4 [x]      5. Need to walk in hospital room?    1 []  2 []  3 []    4 [x]      6. Climbing 3-5 steps with a railing?    1 []  2 []  3 []    4 [x]       Raw Score:     24             CMS 0-100% Score:     0       %   Standardized Score:     61.14          CMS Modifier:      [facility] AM-PAC   Basic Mobility Inpatient Short Form 6 Clicks Score Conversion Table*         *Use this form to convert -PAC Basic Mobility Inpatient Raw Scores.   [facility] Inpatient Basic Mobility Short Form Scoring Example   1. Add the number values associated with the response to each item. For example, items totals yield a Raw Score of 21.   2. Match the raw score to the t-Scale scores (t-Scale score = 50.25, SE = 4.69).   3. Find the associated CMS % (CMS % = 28.97%).   4. Locate the correct CMS Functional Modifier Code, or G Code (G code = CJ)     NOTE: Each -PAC Short Form has a separate conversion table. Make sure that you use the correct conversion table.       Instruction Manual - page 45 contains conversion table

## 2023-11-13 ENCOUNTER — OFFICE VISIT (OUTPATIENT)
Dept: FAMILY MEDICINE | Facility: CLINIC | Age: 58
End: 2023-11-13
Payer: COMMERCIAL

## 2023-11-13 VITALS
OXYGEN SATURATION: 99 % | BODY MASS INDEX: 33.43 KG/M2 | HEIGHT: 72 IN | DIASTOLIC BLOOD PRESSURE: 80 MMHG | WEIGHT: 246.81 LBS | HEART RATE: 74 BPM | SYSTOLIC BLOOD PRESSURE: 128 MMHG

## 2023-11-13 DIAGNOSIS — Z01.818 VISIT FOR PRE-OPERATIVE EXAMINATION: Primary | ICD-10-CM

## 2023-11-13 PROCEDURE — 1159F PR MEDICATION LIST DOCUMENTED IN MEDICAL RECORD: ICD-10-PCS | Mod: CPTII,S$GLB,,

## 2023-11-13 PROCEDURE — 99214 PR OFFICE/OUTPT VISIT, EST, LEVL IV, 30-39 MIN: ICD-10-PCS | Mod: S$GLB,,,

## 2023-11-13 PROCEDURE — 3074F SYST BP LT 130 MM HG: CPT | Mod: CPTII,S$GLB,,

## 2023-11-13 PROCEDURE — 3079F PR MOST RECENT DIASTOLIC BLOOD PRESSURE 80-89 MM HG: ICD-10-PCS | Mod: CPTII,S$GLB,,

## 2023-11-13 PROCEDURE — 99999 PR PBB SHADOW E&M-EST. PATIENT-LVL III: CPT | Mod: PBBFAC,,,

## 2023-11-13 PROCEDURE — 3044F PR MOST RECENT HEMOGLOBIN A1C LEVEL <7.0%: ICD-10-PCS | Mod: CPTII,S$GLB,,

## 2023-11-13 PROCEDURE — 3079F DIAST BP 80-89 MM HG: CPT | Mod: CPTII,S$GLB,,

## 2023-11-13 PROCEDURE — 3008F BODY MASS INDEX DOCD: CPT | Mod: CPTII,S$GLB,,

## 2023-11-13 PROCEDURE — 1159F MED LIST DOCD IN RCRD: CPT | Mod: CPTII,S$GLB,,

## 2023-11-13 PROCEDURE — 99999 PR PBB SHADOW E&M-EST. PATIENT-LVL III: ICD-10-PCS | Mod: PBBFAC,,,

## 2023-11-13 PROCEDURE — 99214 OFFICE O/P EST MOD 30 MIN: CPT | Mod: S$GLB,,,

## 2023-11-13 PROCEDURE — 3074F PR MOST RECENT SYSTOLIC BLOOD PRESSURE < 130 MM HG: ICD-10-PCS | Mod: CPTII,S$GLB,,

## 2023-11-13 PROCEDURE — 3044F HG A1C LEVEL LT 7.0%: CPT | Mod: CPTII,S$GLB,,

## 2023-11-13 PROCEDURE — 3008F PR BODY MASS INDEX (BMI) DOCUMENTED: ICD-10-PCS | Mod: CPTII,S$GLB,,

## 2023-11-13 RX ORDER — SODIUM CHLORIDE, SODIUM LACTATE, POTASSIUM CHLORIDE, AND CALCIUM CHLORIDE .6; .31; .03; .02 G/100ML; G/100ML; G/100ML; G/100ML
IRRIGANT IRRIGATION
COMMUNITY
End: 2023-11-13

## 2023-11-13 RX ORDER — DOXYCYCLINE 100 MG/1
100 CAPSULE ORAL 2 TIMES DAILY
COMMUNITY
Start: 2023-09-21 | End: 2023-11-13

## 2023-11-13 NOTE — PROGRESS NOTES
[facility]  [address]  [place] [address]    Subjective    Chief Complaint:   Chief Complaint   Patient presents with    Pre-op Exam     Surgery on Wed.       History of Present Illness:     David Aguilera is a(n) 58 y.o. male with past medical history as noted below who presents to the clinic today for pre-operative evaluation.    Pre-Op Examination  Patient reports that he is having a R TKA with Dr. Lockhart scheduled for 11/15/2023.  Patient reports that he is previously had anesthesia for a surgical procedure in the past without any complications.    No cardiac history reported.  Is currently not on any blood thinners.  Denies history of sleep apnea.   Patient reports history smoking 0 packs a day. They report 0 alcohol use.       Problem List:   Patient Active Problem List   Diagnosis    ADD (attention deficit disorder with hyperactivity)    Non morbid obesity due to excess calories    Pure hypercholesterolemia    Colon cancer screening    Testicular hypofunction    Polycythemia    Hypertension    Insomnia       Current Outpatient Medications:   Current Outpatient Medications   Medication Instructions    AMBIEN 10 mg, Oral, Nightly    dextroamphetamine-amphetamine (AMPHETAMINE SALT COMBO) 30 mg Tab 1 tablet, Oral, 3 times daily    EScitalopram oxalate (LEXAPRO) 10 MG tablet No dose, route, or frequency recorded.    EScitalopram oxalate (LEXAPRO) 20 mg, Oral    ketoconazole (NIZORAL) 2 % cream Topical (Top), 2 times daily    meloxicam (MOBIC) 15 mg, Oral, Daily    telmisartan-hydrochlorothiazide (MICARDIS HCT) 40-12.5 mg per tablet 1 tablet, Oral, Daily    testosterone cypionate (DEPOTESTOTERONE CYPIONATE) 200 mg/mL injection INJECT 1 ML (200MG) INTRAMUSCULARLY EVERY 14 DAYS       Surgical History:   Past Surgical History:   Procedure Laterality Date    ARTHROSCOPIC CHONDROPLASTY OF KNEE JOINT Right 10/10/2022    Procedure: ARTHROSCOPY, KNEE, WITH  CHONDROPLASTY;  Surgeon: Herbie Hoyt MD;  Location: [facility] OR;  Service: Orthopedics;  Laterality: Right;    COLONOSCOPY N/A 11/06/2018    Dr. Chadwick; unremarkable; repeat in 10 years    EXCISION OF MEDIAL MENISCUS OF KNEE Right 10/10/2022    Procedure: MENISCECTOMY, KNEE, MEDIAL;  Surgeon: Herbie Hoyt MD;  Location: [facility] OR;  Service: Orthopedics;  Laterality: Right;    HERNIA REPAIR  2000    L knee scope      L shoulder surgery      VASECTOMY         Family History:   Family History   Problem Relation Age of Onset    Stroke Mother 76    Colon cancer Father         ?    Stomach cancer Neg Hx     Esophageal cancer Neg Hx     Crohn's disease Neg Hx     Ulcerative colitis Neg Hx     Glaucoma Neg Hx     Macular degeneration Neg Hx     Retinal detachment Neg Hx        Allergies: Review of patient's allergies indicates:  No Known Allergies    Tobacco Status:   Tobacco Use: Low Risk  (10/31/2023)    Patient History     Smoking Tobacco Use: Never     Smokeless Tobacco Use: Never     Passive Exposure: Not on file       Sexual Activity:   Social History     Substance and Sexual Activity   Sexual Activity Yes    Partners: Female       Alcohol Use:   Social History     Substance and Sexual Activity   Alcohol Use Never         Objective       Vitals:    11/13/23 1055   BP: 128/80   Pulse: 74   SpO2: 99%   Weight: 112 kg (246 lb 12.9 oz)   Height: 6' (1.829 m)       Review of Systems   Constitutional:  Negative for chills and fever.   Respiratory:  Negative for sputum production and shortness of breath.    Cardiovascular:  Negative for chest pain and palpitations.       Physical Exam  Constitutional:       General: He is not in acute distress.     Appearance: Normal appearance.   HENT:      Head: Normocephalic and atraumatic.   Cardiovascular:      Rate and Rhythm: Normal rate and regular rhythm.      Heart sounds: Normal heart sounds. No murmur heard.  Pulmonary:      Effort: Pulmonary effort is normal. No  respiratory distress.      Breath sounds: Normal breath sounds. No wheezing.   Skin:     General: Skin is warm.   Neurological:      Mental Status: He is alert and oriented to person, place, and time.   Psychiatric:         Behavior: Behavior normal.           Assessment and Plan:    1. Visit for pre-operative examination        Visit summary:    David Aguilera presented today for pre-operative evaluation.       RCRI risk factors include: no known RCRI risk factors. As such, per RCRI the risk of cardiac death, nonfatal myocardial infarction, or nonfatal cardiac arrest is 0.4% and the risk of myocardial infarction, pulmonary edema, ventricular fibrillation, primary cardiac arrest, or complete heart block is 0.5%.  Overall this patient can be considered low risk for this intermediate risk procedure. EKG reviewed, no acute concerns.     Patient denies any symptoms (as per HPI) concerning for undiagnosed lung disease including CALVIN. X-ray without acute abnormalities.  Would not recommend sleep study, or PFTs at this time. Patient is a non-smoker. We discussed the benefits of early mobilization and deep breathing after surgery.      Screened patient for alcohol misuse, use of illicit drugs, and personal or family history of anesthetic complications or bleeding diathesis and no substantial concerns were identified.     All current medications were reviewed and at this time no changes to medications are recommended prior to surgery. Pt has discontinued all NSAIDs, aspirin, fish oil 2 weeks ago. No vitamins ADEK.     He has no known personal history of cardiovascular disease (no CAD, PAD, or strokes). He has no history of heart failure, diabetes, or chronic kidney disease. He denies symptoms of angina, syncope, dyspnea, or palpitations. He is able to walk up 2 flights of stairs without chest pain or shortness of breath.     He denies exercise intolerance, unexplained dyspnea, or cough. He has no known history of obstructive  sleep apnea and denies snoring, unusual daytime fatigue, or witnessed apneas.     I recommend use of standard pre-op and post-op precautions for this patient. In my opinion, he is medically optimized for this procedure, and can proceed without further evaluation.     Vicki Pavon PA-C

## 2023-11-13 NOTE — Clinical Note
I saw this pt for pre-op exam, please see my note.   Let me know if there are questions or concerns.   Vicki Pavon PA-C

## 2023-11-14 DIAGNOSIS — M17.11 UNILATERAL PRIMARY OSTEOARTHRITIS, RIGHT KNEE: Primary | ICD-10-CM

## 2023-11-15 ENCOUNTER — HOSPITAL ENCOUNTER (OUTPATIENT)
Facility: HOSPITAL | Age: 58
Discharge: HOME | End: 2023-11-15
Attending: ORTHOPAEDIC SURGERY | Admitting: ORTHOPAEDIC SURGERY
Payer: COMMERCIAL

## 2023-11-15 ENCOUNTER — ANESTHESIA EVENT (OUTPATIENT)
Dept: SURGERY | Facility: HOSPITAL | Age: 58
End: 2023-11-15
Payer: COMMERCIAL

## 2023-11-15 ENCOUNTER — ANESTHESIA (OUTPATIENT)
Dept: SURGERY | Facility: HOSPITAL | Age: 58
End: 2023-11-15
Payer: COMMERCIAL

## 2023-11-15 DIAGNOSIS — M17.11 UNILATERAL PRIMARY OSTEOARTHRITIS, RIGHT KNEE: Primary | ICD-10-CM

## 2023-11-15 DIAGNOSIS — Z01.818 PREOP TESTING: ICD-10-CM

## 2023-11-15 PROCEDURE — 25000003 PHARM REV CODE 250: Performed by: NURSE ANESTHETIST, CERTIFIED REGISTERED

## 2023-11-15 PROCEDURE — 63600175 PHARM REV CODE 636 W HCPCS: Performed by: ANESTHESIOLOGY

## 2023-11-15 PROCEDURE — 64447 NJX AA&/STRD FEMORAL NRV IMG: CPT | Performed by: ANESTHESIOLOGY

## 2023-11-15 PROCEDURE — C9290 INJ, BUPIVACAINE LIPOSOME: HCPCS | Performed by: ANESTHESIOLOGY

## 2023-11-15 PROCEDURE — 97161 PT EVAL LOW COMPLEX 20 MIN: CPT

## 2023-11-15 PROCEDURE — 64447 NJX AA&/STRD FEMORAL NRV IMG: CPT | Mod: 59,RT,, | Performed by: ANESTHESIOLOGY

## 2023-11-15 PROCEDURE — 27447 PR TOTAL KNEE ARTHROPLASTY: ICD-10-PCS | Mod: RT,,, | Performed by: ORTHOPAEDIC SURGERY

## 2023-11-15 PROCEDURE — 37000009 HC ANESTHESIA EA ADD 15 MINS: Performed by: ORTHOPAEDIC SURGERY

## 2023-11-15 PROCEDURE — 71000016 HC POSTOP RECOV ADDL HR: Performed by: ORTHOPAEDIC SURGERY

## 2023-11-15 PROCEDURE — 71000015 HC POSTOP RECOV 1ST HR: Performed by: ORTHOPAEDIC SURGERY

## 2023-11-15 PROCEDURE — 27200750 HC INSULATED NEEDLE/ STIMUPLEX: Performed by: ANESTHESIOLOGY

## 2023-11-15 PROCEDURE — 37000008 HC ANESTHESIA 1ST 15 MINUTES: Performed by: ORTHOPAEDIC SURGERY

## 2023-11-15 PROCEDURE — D9220A PRA ANESTHESIA: ICD-10-PCS | Mod: ANES,,, | Performed by: ANESTHESIOLOGY

## 2023-11-15 PROCEDURE — 63600175 PHARM REV CODE 636 W HCPCS: Performed by: NURSE ANESTHETIST, CERTIFIED REGISTERED

## 2023-11-15 PROCEDURE — D9220A PRA ANESTHESIA: ICD-10-PCS | Mod: CRNA,,, | Performed by: NURSE ANESTHETIST, CERTIFIED REGISTERED

## 2023-11-15 PROCEDURE — D9220A PRA ANESTHESIA: Mod: ANES,,, | Performed by: ANESTHESIOLOGY

## 2023-11-15 PROCEDURE — 27447 TOTAL KNEE ARTHROPLASTY: CPT | Mod: RT,,, | Performed by: ORTHOPAEDIC SURGERY

## 2023-11-15 PROCEDURE — 94799 UNLISTED PULMONARY SVC/PX: CPT

## 2023-11-15 PROCEDURE — 27201423 OPTIME MED/SURG SUP & DEVICES STERILE SUPPLY: Performed by: ORTHOPAEDIC SURGERY

## 2023-11-15 PROCEDURE — 36000711: Performed by: ORTHOPAEDIC SURGERY

## 2023-11-15 PROCEDURE — 25000003 PHARM REV CODE 250: Performed by: ORTHOPAEDIC SURGERY

## 2023-11-15 PROCEDURE — 71000033 HC RECOVERY, INTIAL HOUR: Performed by: ORTHOPAEDIC SURGERY

## 2023-11-15 PROCEDURE — 25000003 PHARM REV CODE 250: Performed by: ANESTHESIOLOGY

## 2023-11-15 PROCEDURE — 64447 RIGHT ADDUCTOR CANAL: ICD-10-PCS | Mod: 59,RT,, | Performed by: ANESTHESIOLOGY

## 2023-11-15 PROCEDURE — 63600175 PHARM REV CODE 636 W HCPCS: Performed by: ORTHOPAEDIC SURGERY

## 2023-11-15 PROCEDURE — 36000710: Performed by: ORTHOPAEDIC SURGERY

## 2023-11-15 PROCEDURE — D9220A PRA ANESTHESIA: Mod: CRNA,,, | Performed by: NURSE ANESTHETIST, CERTIFIED REGISTERED

## 2023-11-15 PROCEDURE — C1776 JOINT DEVICE (IMPLANTABLE): HCPCS | Performed by: ORTHOPAEDIC SURGERY

## 2023-11-15 PROCEDURE — 63600175 PHARM REV CODE 636 W HCPCS

## 2023-11-15 PROCEDURE — 71000039 HC RECOVERY, EACH ADD'L HOUR: Performed by: ORTHOPAEDIC SURGERY

## 2023-11-15 PROCEDURE — 27200688 HC TRAY, SPINAL-HYPER/ ISOBARIC: Performed by: ANESTHESIOLOGY

## 2023-11-15 PROCEDURE — C1713 ANCHOR/SCREW BN/BN,TIS/BN: HCPCS | Performed by: ORTHOPAEDIC SURGERY

## 2023-11-15 PROCEDURE — 97116 GAIT TRAINING THERAPY: CPT

## 2023-11-15 DEVICE — IMPLANTABLE DEVICE: Type: IMPLANTABLE DEVICE | Site: KNEE | Status: FUNCTIONAL

## 2023-11-15 DEVICE — CEMENT BONE WHOLE BATCH: Type: IMPLANTABLE DEVICE | Site: KNEE | Status: FUNCTIONAL

## 2023-11-15 DEVICE — PSN ALL POLY PAT 35MM: Type: IMPLANTABLE DEVICE | Site: KNEE | Status: FUNCTIONAL

## 2023-11-15 DEVICE — BASEPLATE TIB STEM CEM 5DEG RT: Type: IMPLANTABLE DEVICE | Site: KNEE | Status: FUNCTIONAL

## 2023-11-15 RX ORDER — ONDANSETRON 2 MG/ML
INJECTION INTRAMUSCULAR; INTRAVENOUS
Status: DISCONTINUED | OUTPATIENT
Start: 2023-11-15 | End: 2023-11-15

## 2023-11-15 RX ORDER — HYDROCODONE BITARTRATE AND ACETAMINOPHEN 7.5; 325 MG/1; MG/1
1 TABLET ORAL EVERY 6 HOURS PRN
Qty: 28 TABLET | Refills: 0 | Status: SHIPPED | OUTPATIENT
Start: 2023-11-15 | End: 2023-11-28 | Stop reason: SDUPTHER

## 2023-11-15 RX ORDER — MEPERIDINE HYDROCHLORIDE 50 MG/ML
12.5 INJECTION INTRAMUSCULAR; INTRAVENOUS; SUBCUTANEOUS ONCE
Status: DISCONTINUED | OUTPATIENT
Start: 2023-11-15 | End: 2023-11-15 | Stop reason: HOSPADM

## 2023-11-15 RX ORDER — PROCHLORPERAZINE EDISYLATE 5 MG/ML
5 INJECTION INTRAMUSCULAR; INTRAVENOUS EVERY 4 HOURS PRN
Status: DISCONTINUED | OUTPATIENT
Start: 2023-11-15 | End: 2023-11-15 | Stop reason: HOSPADM

## 2023-11-15 RX ORDER — OXYCODONE HYDROCHLORIDE 5 MG/1
5 TABLET ORAL
Status: DISCONTINUED | OUTPATIENT
Start: 2023-11-15 | End: 2023-11-15 | Stop reason: HOSPADM

## 2023-11-15 RX ORDER — ONDANSETRON 8 MG/1
8 TABLET, ORALLY DISINTEGRATING ORAL 2 TIMES DAILY PRN
Qty: 30 TABLET | Refills: 0 | Status: SHIPPED | OUTPATIENT
Start: 2023-11-15

## 2023-11-15 RX ORDER — BUPIVACAINE HYDROCHLORIDE 5 MG/ML
INJECTION, SOLUTION EPIDURAL; INTRACAUDAL
Status: COMPLETED | OUTPATIENT
Start: 2023-11-15 | End: 2023-11-15

## 2023-11-15 RX ORDER — CEFAZOLIN SODIUM 2 G/50ML
2 SOLUTION INTRAVENOUS
Status: COMPLETED | OUTPATIENT
Start: 2023-11-15 | End: 2023-11-15

## 2023-11-15 RX ORDER — DEXAMETHASONE SODIUM PHOSPHATE 4 MG/ML
INJECTION, SOLUTION INTRA-ARTICULAR; INTRALESIONAL; INTRAMUSCULAR; INTRAVENOUS; SOFT TISSUE
Status: DISCONTINUED | OUTPATIENT
Start: 2023-11-15 | End: 2023-11-15

## 2023-11-15 RX ORDER — ONDANSETRON 2 MG/ML
4 INJECTION INTRAMUSCULAR; INTRAVENOUS ONCE
Status: DISCONTINUED | OUTPATIENT
Start: 2023-11-15 | End: 2023-11-15 | Stop reason: HOSPADM

## 2023-11-15 RX ORDER — TRANEXAMIC ACID 100 MG/ML
INJECTION, SOLUTION INTRAVENOUS
Status: DISCONTINUED | OUTPATIENT
Start: 2023-11-15 | End: 2023-11-15

## 2023-11-15 RX ORDER — BUPIVACAINE HYDROCHLORIDE 7.5 MG/ML
INJECTION, SOLUTION EPIDURAL; RETROBULBAR
Status: COMPLETED | OUTPATIENT
Start: 2023-11-15 | End: 2023-11-15

## 2023-11-15 RX ORDER — FENTANYL CITRATE 50 UG/ML
25 INJECTION, SOLUTION INTRAMUSCULAR; INTRAVENOUS EVERY 5 MIN PRN
Status: DISCONTINUED | OUTPATIENT
Start: 2023-11-15 | End: 2023-11-15 | Stop reason: HOSPADM

## 2023-11-15 RX ORDER — LIDOCAINE HYDROCHLORIDE 20 MG/ML
INJECTION INTRAVENOUS
Status: DISCONTINUED | OUTPATIENT
Start: 2023-11-15 | End: 2023-11-15

## 2023-11-15 RX ORDER — PROPOFOL 10 MG/ML
VIAL (ML) INTRAVENOUS CONTINUOUS PRN
Status: DISCONTINUED | OUTPATIENT
Start: 2023-11-15 | End: 2023-11-15

## 2023-11-15 RX ORDER — ASPIRIN 81 MG/1
81 TABLET ORAL 2 TIMES DAILY
Qty: 28 TABLET | Refills: 0 | Status: SHIPPED | OUTPATIENT
Start: 2023-11-15 | End: 2023-12-14

## 2023-11-15 RX ORDER — DIPHENHYDRAMINE HYDROCHLORIDE 50 MG/ML
25 INJECTION INTRAMUSCULAR; INTRAVENOUS EVERY 6 HOURS PRN
Status: DISCONTINUED | OUTPATIENT
Start: 2023-11-15 | End: 2023-11-15 | Stop reason: HOSPADM

## 2023-11-15 RX ORDER — MIDAZOLAM HYDROCHLORIDE 1 MG/ML
INJECTION, SOLUTION INTRAMUSCULAR; INTRAVENOUS
Status: DISCONTINUED | OUTPATIENT
Start: 2023-11-15 | End: 2023-11-15

## 2023-11-15 RX ORDER — FENTANYL CITRATE 50 UG/ML
INJECTION, SOLUTION INTRAMUSCULAR; INTRAVENOUS
Status: DISCONTINUED | OUTPATIENT
Start: 2023-11-15 | End: 2023-11-15

## 2023-11-15 RX ORDER — KETAMINE HYDROCHLORIDE 10 MG/ML
INJECTION, SOLUTION INTRAMUSCULAR; INTRAVENOUS
Status: DISCONTINUED | OUTPATIENT
Start: 2023-11-15 | End: 2023-11-15

## 2023-11-15 RX ORDER — OXYCODONE HYDROCHLORIDE 5 MG/1
5 TABLET ORAL ONCE AS NEEDED
Status: COMPLETED | OUTPATIENT
Start: 2023-11-15 | End: 2023-11-15

## 2023-11-15 RX ORDER — HYDROMORPHONE HYDROCHLORIDE 2 MG/ML
0.2 INJECTION, SOLUTION INTRAMUSCULAR; INTRAVENOUS; SUBCUTANEOUS EVERY 5 MIN PRN
Status: DISCONTINUED | OUTPATIENT
Start: 2023-11-15 | End: 2023-11-15 | Stop reason: HOSPADM

## 2023-11-15 RX ORDER — MUPIROCIN 20 MG/G
OINTMENT TOPICAL
Status: DISCONTINUED | OUTPATIENT
Start: 2023-11-15 | End: 2023-11-15 | Stop reason: HOSPADM

## 2023-11-15 RX ADMIN — TRANEXAMIC ACID 1000 MG: 100 INJECTION, SOLUTION INTRAVENOUS at 02:11

## 2023-11-15 RX ADMIN — PROPOFOL 50 MCG/KG/MIN: 10 INJECTION, EMULSION INTRAVENOUS at 02:11

## 2023-11-15 RX ADMIN — SODIUM CHLORIDE, SODIUM GLUCONATE, SODIUM ACETATE, POTASSIUM CHLORIDE, MAGNESIUM CHLORIDE, SODIUM PHOSPHATE, DIBASIC, AND POTASSIUM PHOSPHATE: .53; .5; .37; .037; .03; .012; .00082 INJECTION, SOLUTION INTRAVENOUS at 12:11

## 2023-11-15 RX ADMIN — HYDROMORPHONE HYDROCHLORIDE 0.2 MG: 2 INJECTION INTRAMUSCULAR; INTRAVENOUS; SUBCUTANEOUS at 04:11

## 2023-11-15 RX ADMIN — LIDOCAINE HYDROCHLORIDE 20 MG: 20 INJECTION, SOLUTION INTRAVENOUS at 02:11

## 2023-11-15 RX ADMIN — FENTANYL CITRATE 25 MCG: 50 INJECTION INTRAMUSCULAR; INTRAVENOUS at 04:11

## 2023-11-15 RX ADMIN — DEXAMETHASONE SODIUM PHOSPHATE 8 MG: 4 INJECTION, SOLUTION INTRA-ARTICULAR; INTRALESIONAL; INTRAMUSCULAR; INTRAVENOUS; SOFT TISSUE at 02:11

## 2023-11-15 RX ADMIN — ONDANSETRON 4 MG: 2 INJECTION INTRAMUSCULAR; INTRAVENOUS at 02:11

## 2023-11-15 RX ADMIN — KETAMINE HYDROCHLORIDE 25 MG: 10 INJECTION, SOLUTION INTRAMUSCULAR; INTRAVENOUS at 02:11

## 2023-11-15 RX ADMIN — OXYCODONE HYDROCHLORIDE 5 MG: 5 TABLET ORAL at 06:11

## 2023-11-15 RX ADMIN — BUPIVACAINE HYDROCHLORIDE 1.6 ML: 7.5 INJECTION, SOLUTION EPIDURAL; RETROBULBAR at 02:11

## 2023-11-15 RX ADMIN — CEFAZOLIN SODIUM 2 G: 2 SOLUTION INTRAVENOUS at 02:11

## 2023-11-15 RX ADMIN — SODIUM CHLORIDE, SODIUM GLUCONATE, SODIUM ACETATE, POTASSIUM CHLORIDE, MAGNESIUM CHLORIDE, SODIUM PHOSPHATE, DIBASIC, AND POTASSIUM PHOSPHATE: .53; .5; .37; .037; .03; .012; .00082 INJECTION, SOLUTION INTRAVENOUS at 02:11

## 2023-11-15 RX ADMIN — BUPIVACAINE HYDROCHLORIDE 10 ML: 5 INJECTION, SOLUTION EPIDURAL; INTRACAUDAL; PERINEURAL at 01:11

## 2023-11-15 RX ADMIN — GLYCOPYRROLATE 0.2 MG: 0.2 INJECTION, SOLUTION INTRAMUSCULAR; INTRAVITREAL at 02:11

## 2023-11-15 RX ADMIN — HYDROMORPHONE HYDROCHLORIDE 0.2 MG: 2 INJECTION INTRAMUSCULAR; INTRAVENOUS; SUBCUTANEOUS at 05:11

## 2023-11-15 RX ADMIN — MIDAZOLAM 2 MG: 1 INJECTION INTRAMUSCULAR; INTRAVENOUS at 12:11

## 2023-11-15 RX ADMIN — FENTANYL CITRATE 100 MCG: 50 INJECTION, SOLUTION INTRAMUSCULAR; INTRAVENOUS at 12:11

## 2023-11-15 RX ADMIN — OXYCODONE HYDROCHLORIDE 5 MG: 5 TABLET ORAL at 04:11

## 2023-11-15 RX ADMIN — MUPIROCIN: 20 OINTMENT TOPICAL at 12:11

## 2023-11-15 RX ADMIN — BUPIVACAINE 20 ML: 13.3 INJECTION, SUSPENSION, LIPOSOMAL INFILTRATION at 12:11

## 2023-11-15 NOTE — ANESTHESIA POSTPROCEDURE EVALUATION
Anesthesia Post Evaluation    Patient: David ADLER Hingle    Procedure(s) Performed: Procedure(s) (LRB):  ARTHROPLASTY, KNEE, TOTAL (Right)    Final Anesthesia Type: spinal      Patient location during evaluation: PACU  Patient participation: Yes- Able to Participate  Level of consciousness: awake and alert and oriented  Post-procedure vital signs: reviewed and stable  Pain management: adequate  Airway patency: patent    PONV status at discharge: No PONV  Anesthetic complications: no      Cardiovascular status: blood pressure returned to baseline and stable  Respiratory status: unassisted and spontaneous ventilation  Hydration status: euvolemic  Follow-up not needed.          Vitals Value Taken Time   /64 11/15/23 1715   Temp 36.4 °C (97.5 °F) 11/15/23 1700   Pulse 52 11/15/23 1717   Resp 12 11/15/23 1717   SpO2 98 % 11/15/23 1717   Vitals shown include unvalidated device data.      No case tracking events are documented in the log.      Pain/Tremaine Score: Pain Rating Prior to Med Admin: 7 (11/15/2023  5:10 PM)  Tremaine Score: 10 (11/15/2023  5:00 PM)

## 2023-11-15 NOTE — TRANSFER OF CARE
Anesthesia Transfer of Care Note    Patient: David A Hingle    Procedure(s) Performed: Procedure(s) (LRB):  ARTHROPLASTY, KNEE, TOTAL (Right)    Patient location: PACU    Anesthesia Type: spinal    Transport from OR: Transported from OR on 2-3 L/min O2 by NC with adequate spontaneous ventilation    Post pain: adequate analgesia    Post assessment: no apparent anesthetic complications and tolerated procedure well    Post vital signs: stable    Level of consciousness: awake, alert and oriented    Nausea/Vomiting: no nausea/vomiting    Complications: none    Transfer of care protocol was followed      Last vitals: Visit Vitals  /79 (BP Location: Right arm, Patient Position: Lying)   Pulse (!) 59   Temp 36.6 °C (97.8 °F) (Core ([place]))   Resp 12   Ht 6' (1.829 m)   Wt 112 kg (246 lb 14.6 oz)   SpO2 98%   BMI 33.49 kg/m²

## 2023-11-15 NOTE — ANESTHESIA PREPROCEDURE EVALUATION
11/15/2023  David Aguilera is a 58 y.o., male.      Pre-op Assessment    I have reviewed the Patient Summary Reports.     I have reviewed the Nursing Notes. I have reviewed the NPO Status.   I have reviewed the Medications.     Review of Systems  Anesthesia Hx:  No problems with previous Anesthesia                Social:  Non-Smoker       Hematology/Oncology:                   Hematology Comments: Polycythemia                      Cardiovascular:     Hypertension, well controlled           hyperlipidemia                             Pulmonary:  Pulmonary Normal                       Renal/:  Renal/ Normal                 Hepatic/GI:     GERD, well controlled             Neurological:  Neurology Normal                                      Endocrine:  Endocrine Normal          Obesity / BMI > 30, Morbid Obesity / BMI > 40  Psych:  Psychiatric History (ADD)                Physical Exam  General: Well nourished, Cooperative, Alert and Oriented    Airway:  Mallampati: II   Mouth Opening: Normal  TM Distance: Normal  Neck ROM: Normal ROM    Anesthesia Plan  Type of Anesthesia, risks & benefits discussed:    Anesthesia Type: Gen ETT, Gen Supraglottic Airway, Gen Natural Airway, MAC, Spinal  Intra-op Monitoring Plan: Standard ASA Monitors  Post Op Pain Control Plan: multimodal analgesia and peripheral nerve block  Induction:  IV  Airway Plan: Direct, Video and Fiberoptic, Post-Induction  Informed Consent: Informed consent signed with the Patient and all parties understand the risks and agree with anesthesia plan.  All questions answered.   ASA Score: 3    Ready For Surgery From Anesthesia Perspective.     .

## 2023-11-15 NOTE — PROGRESS NOTES
Criteria per anesthesia for transfer to post op . Tolerating po fluids Pain addressed with po and IV analgesics Transferred per stretcher to phase 2 Report given to Fe

## 2023-11-15 NOTE — DISCHARGE SUMMARY
[facility]  Department of Orthopedic Surgery  Discharge Note  Short Stay    Admit Date: 11/15/2023    Discharge Date and Time: No discharge date for patient encounter.     Attending Physician: Navarro Lockhart II, MD     Discharge Provider: Navarro Lockhart II    Diagnoses:  Active Hospital Problems    Diagnosis  POA    Unilateral primary osteoarthritis, right knee [M17.11]  Yes      Resolved Hospital Problems   No resolved problems to display.       Discharged Condition: good    Hospital Course: Patient was admitted for an outpatient procedure and tolerated the procedure well with no complications.    Final Diagnoses: Same as principal problem.    Disposition: Home or Self Care    Follow up/Patient Instructions:    Medications:  Reconciled Home Medications:      Medication List        CONTINUE taking these medications      AMBIEN 10 mg Tab  Generic drug: zolpidem  Take 10 mg by mouth every evening.     AMPHETAMINE SALT COMBO 30 mg Tab  Generic drug: dextroamphetamine-amphetamine  Take 1 tablet by mouth 3 (three) times daily.     aspirin 81 MG EC tablet  Commonly known as: ECOTRIN  Take 1 tablet (81 mg total) by mouth 2 (two) times a day. for 14 days     EScitalopram oxalate 20 MG tablet  Commonly known as: LEXAPRO  Take 20 mg by mouth.     HYDROcodone-acetaminophen 7.5-325 mg per tablet  Commonly known as: NORCO  Take 1 tablet by mouth every 6 (six) hours as needed for Pain.     ondansetron 8 MG Tbdl  Commonly known as: ZOFRAN-ODT  Take 1 tablet (8 mg total) by mouth 2 (two) times daily as needed.     testosterone cypionate 200 mg/mL injection  Commonly known as: DEPOTESTOTERONE CYPIONATE  INJECT 1 ML (200MG) INTRAMUSCULARLY EVERY 14 DAYS            Discharge Procedure Orders   Diet Adult Regular     Ice to affected area     Notify your health care provider if you experience any of the following:  increased confusion or weakness     Notify your health care provider if you experience any of the following:   persistent dizziness, light-headedness, or visual disturbances     Notify your health care provider if you experience any of the following:  worsening rash     Notify your health care provider if you experience any of the following:  severe persistent headache     Notify your health care provider if you experience any of the following:  difficulty breathing or increased cough     Notify your health care provider if you experience any of the following:  redness, tenderness, or signs of infection (pain, swelling, redness, odor or green/yellow discharge around incision site)     Notify your health care provider if you experience any of the following:  severe uncontrolled pain     Notify your health care provider if you experience any of the following:  persistent nausea and vomiting or diarrhea     Notify your health care provider if you experience any of the following:  temperature >100.4     Leave dressing on - Keep it clean, dry, and intact until clinic visit     Weight bearing restrictions (specify):   Order Comments: WBAT RLE      Follow-up Information       [place] . Call in 1 day(s).    Specialty: Home Health Services  Why: As needed  Contact information:  [address] [place] [address]  [address]  [place] [address]  [phone]               Navarro Lockhart II, MD Follow up in 2 week(s).    Specialty: Orthopedic Surgery  Contact information:  [address] [place] [address] [address] [place] [address]  [phone]                             Discharge Procedure Orders (must include Diet, Follow-up, Activity):   Discharge Procedure Orders (must include Diet, Follow-up, Activity)   Diet Adult Regular     Ice to affected area     Notify your health care provider if you experience any of the following:  increased confusion or weakness     Notify your health care provider if you experience any of the following:  persistent dizziness, light-headedness, or visual disturbances     Notify your health care provider if  you experience any of the following:  worsening rash     Notify your health care provider if you experience any of the following:  severe persistent headache     Notify your health care provider if you experience any of the following:  difficulty breathing or increased cough     Notify your health care provider if you experience any of the following:  redness, tenderness, or signs of infection (pain, swelling, redness, odor or green/yellow discharge around incision site)     Notify your health care provider if you experience any of the following:  severe uncontrolled pain     Notify your health care provider if you experience any of the following:  persistent nausea and vomiting or diarrhea     Notify your health care provider if you experience any of the following:  temperature >100.4     Leave dressing on - Keep it clean, dry, and intact until clinic visit     Weight bearing restrictions (specify):   Order Comments: MARK ANTHONY CHU

## 2023-11-15 NOTE — DISCHARGE INSTRUCTIONS
Post op instructions for prevention of DVT  What is deep vein thrombosis?  Deep vein thrombosis (DVT) is the medical term for blood clots in the deep veins of the leg.  These blood clots can be dangerous.  A DVT can block a blood vessel and keep blood from getting where it needs to go.  Another problem is that the clot can travel to other parts of the body such as the lungs.  A clot that travels to the lungs is called a pulmonary embolus (PE) and can cause serious problems with breathing which can lead to death.  Am I at risk for DVT/PE?  If you are not very active, you are at risk of DVT.  Anyone confined to bed, sitting for long periods of time, recovering from surgery, etc. increases the risk of DVT.  Other risk factors are cancer diagnosis, certain medications, estrogen replacement in any form,older age, obesity, pregnancy, smoking, history of clotting disorders, and dehydration.  How will I know if I have a DVT?  Swelling in the lower leg  Pain  Warmth, redness, hardness or bulging of the vein  If you have any of these symptoms, call your doctors office right away.  Some people will not have any symptoms until the clot moves to the lungs.  What are the symptoms of a PE?  Panting, shortness of breath, or trouble breathing  Sharp, knife-like chest pain when you breathe  Coughing or coughing up blood  Rapid heartbeat  If you have any of these symptoms or get worse quickly, call 911 for emergency treatment.  How can I prevent a DVT?  Avoid long periods of inactivity and dont cross your legs--get up and walk around every hour or so.  Stay active--walking after surgery is highly encouraged.  This means you should get out of the house and walk in the neighborhood.  Going up and down stairs will not impair healing (unless advised against such activity by your doctor).    Drink plenty of noncaffeinated, nonalcoholic fluids each day to prevent dehydration.  Wear special support stockings, if they have been advised by  "your doctor.  If you travel, stop at least once an hour and walk around.  Avoid smoking (assistance with stopping is available through your healthcare provider)  Always notify your doctor if you are not able to follow the post operative instructions that are given to you at the time of discharge.  It may be necessary to prescribe one of the medications available to prevent DVT.We hope your stay was comfortable as you heal now, mend and rest.    For we have enjoyed taking care of you by giving your our best.    And as you get better, by regaining your health and strength;   We count it as a privilege to have served you and hope your time at [facility] was well spent.      Thank  You!!!                  Discharge Instructions: After Your Surgery/Procedure  Youve just had surgery. During surgery you were given medicine called anesthesia to keep you relaxed and free of pain. After surgery you may have some pain or nausea. This is common. Here are some tips for feeling better and getting well after surgery.     Stay on schedule with your medication.   Going home  Your doctor or nurse will show you how to take care of yourself when you go home. He or she will also answer your questions. Have an adult family member or friend drive you home.      For your safety we recommend these precaution for the first 24 hours after your procedure:  Do not drive or use heavy equipment.  Do not make important decisions or sign legal papers.  Do not drink alcohol.  Have someone stay with you, if needed. He or she can watch for problems and help keep you safe.  Your concentration, balance, coordination, and judgement may be impaired for many hours after anesthesia.  Use caution when ambulating or standing up.     You may feel weak and "washed out" after anesthesia and surgery.      Subtle residual effects of general anesthesia or sedation with regional / local anesthesia can last more than 24 hours.  Rest for the remainder of the day or " longer if your Doctor/Surgeon has advised you to do so.  Although you may feel normal within the first 24 hours, your reflexes and mental ability may be impaired without you realizing it.  You may feel dizzy, lightheaded or sleepy for 24 hours or longer.      Be sure to go to all follow-up visits with your doctor. And rest after your surgery for as long as your doctor tells you to.  Coping with pain  If you have pain after surgery, pain medicine will help you feel better. Take it as told, before pain becomes severe. Also, ask your doctor or pharmacist about other ways to control pain. This might be with heat, ice, or relaxation. And follow any other instructions your surgeon or nurse gives you.  Tips for taking pain medicine  To get the best relief possible, remember these points:  Pain medicines can upset your stomach. Taking them with a little food may help.  Most pain relievers taken by mouth need at least 20 to 30 minutes to start to work.  Taking medicine on a schedule can help you remember to take it. Try to time your medicine so that you can take it before starting an activity. This might be before you get dressed, go for a walk, or sit down for dinner.  Constipation is a common side effect of pain medicines. Call your doctor before taking any medicines such as laxatives or stool softeners to help ease constipation. Also ask if you should skip any foods. Drinking lots of fluids and eating foods such as fruits and vegetables that are high in fiber can also help. Remember, do not take laxatives unless your surgeon has prescribed them.  Drinking alcohol and taking pain medicine can cause dizziness and slow your breathing. It can even be deadly. Do not drink alcohol while taking pain medicine.  Pain medicine can make you react more slowly to things. Do not drive or run machinery while taking pain medicine.  Your health care provider may tell you to take acetaminophen to help ease your pain. Ask him or her how  much you are supposed to take each day. Acetaminophen or other pain relievers may interact with your prescription medicines or other over-the-counter (OTC) drugs. Some prescription medicines have acetaminophen and other ingredients. Using both prescription and OTC acetaminophen for pain can cause you to overdose. Read the labels on your OTC medicines with care. This will help you to clearly know the list of ingredients, how much to take, and any warnings. It may also help you not take too much acetaminophen. If you have questions or do not understand the information, ask your pharmacist or health care provider to explain it to you before you take the OTC medicine.  Managing nausea  Some people have an upset stomach after surgery. This is often because of anesthesia, pain, or pain medicine, or the stress of surgery. These tips will help you handle nausea and eat healthy foods as you get better. If you were on a special food plan before surgery, ask your doctor if you should follow it while you get better. These tips may help:  Do not push yourself to eat. Your body will tell you when to eat and how much.  Start off with clear liquids and soup. They are easier to digest.  Next try semi-solid foods, such as mashed potatoes, applesauce, and gelatin, as you feel ready.  Slowly move to solid foods. Dont eat fatty, rich, or spicy foods at first.  Do not force yourself to have 3 large meals a day. Instead eat smaller amounts more often.  Take pain medicines with a small amount of solid food, such as crackers or toast, to avoid nausea.     Call your surgeon if  You still have pain an hour after taking medicine. The medicine may not be strong enough.  You feel too sleepy, dizzy, or groggy. The medicine may be too strong.  You have side effects like nausea, vomiting, or skin changes, such as rash, itching, or hives.       If you have obstructive sleep apnea  You were given anesthesia medicine during surgery to keep you  comfortable and free of pain. After surgery, you may have more apnea spells because of this medicine and other medicines you were given. The spells may last longer than usual.   At home:  Keep using the continuous positive airway pressure (CPAP) device when you sleep. Unless your health care provider tells you not to, use it when you sleep, day or night. CPAP is a common device used to treat obstructive sleep apnea.  Talk with your provider before taking any pain medicine, muscle relaxants, or sedatives. Your provider will tell you about the possible dangers of taking these medicines.  © 6418-3865 [employer]. [address] [place] [address]. All rights reserved. This information is not intended as a substitute for professional medical care. Always follow your healthcare professional's instructions.                  Using an Incentive Spirometer    An incentive spirometer is a device that helps you do deep breathing exercises. These exercises expand your lungs, aid in circulation, and help prevent pneumonia. Deep breathing exercises also help you breathe better and improve the function of your lungs by:  Keeping your lungs clear  Strengthening your breathing muscles  Helping prevent respiratory complications or problems  The incentive spirometer gives you a way to take an active part in recover. A nurse or therapist will teach you breathing exercises. To do these exercises, you will breathe in through your mouth and not your nose. The incentive spirometer only works correctly if you breathe in through your mouth.  Steps to clear lungs  Step 1. Exhale normally. Then, inhale normally.  Relax and breathe out.  Step 2. Place your lips tightly around the mouthpiece.  Make sure the device is upright and not tilted.  Step 3. Inhale as much air as you can through the mouthpiece (don't breath through your nose).  Inhale slowly and deeply.  Hold your breath long enough to keep the balls or disk raised  for at least 3 to 5 seconds, or as instructed by your healthcare provider.  Some spirometers have an indicator to let you know that you are breathing in too fast. If the indicator goes off, breathe in more slowly.  Step 4. Repeat the exercise regularly.  Do this exercise every hour while you're awake, or as instructed by your healthcare provider.  If you were taught deep breathing and coughing exercises, do them regularly as instructed by your healthcare provider.                     Exparel(bupivacaine) has been injected to provide approximately 72 hours of reduced pain after your surgery.  Do not remove the bracelet for five days.  Report to your doctor as soon as possible if you experience any of the following:   Restlessness   Anxiety   Speech problems    Lightheadedness   Numbness and tingling of the mouth and lips   Seizures    Metallic taste   Blurred vision   Tremors    Twitching   Depression   Extreme drowsiness  Avoid additional use of local anesthetics (such as dental procedures) for five days (96 hours).

## 2023-11-15 NOTE — OP NOTE
[facility]  Orthopedic Surgery Department  Operative Note    SUMMARY     Date of Procedure: 11/15/2023     Procedure: Procedure(s) (LRB):  ARTHROPLASTY, KNEE, TOTAL (Right)     Surgeon(s) and Role:     * Navarro Lockhart II, MD - Primary    Assisting Surgeon: None    First Assist:  EHNRY Rivas    Pre-Operative Diagnosis: Unilateral primary osteoarthritis, right knee [M17.11]  Preop testing [Z01.818]    Post-Operative Diagnosis: Post-Op Diagnosis Codes:     * Unilateral primary osteoarthritis, right knee [M17.11]     * Preop testing [Z01.818]    Anesthesia: Choice    Technical Procedures Used: Right TKA    Description of the Findings of the Procedure: Dictated    Significant Surgical Tasks Conducted by the Assistant(s), if Applicable: Positioning and prepping, retraction during exposure and implant insertion, wound closure and bandage application    Complications: No    Estimated Blood Loss (EBL): * No values recorded between 11/15/2023  2:48 PM and 11/15/2023  3:55 PM *           Implants:   Implant Name Type Inv. Item Serial No.  Lot No. LRB No. Used Action   CEMENT BONE WHOLE BATCH - [id number]  CEMENT BONE WHOLE BATCH  Kodable ANNE MARIE. [id number] Right 2 Implanted   PIN DRILL HEADLESS TROCAR - [id number]  PIN DRILL HEADLESS TROCAR  JOSE,INC [id number] Right 4 Implanted and Explanted   PERSONA The Personalized Knee System, 2.5mm Female Hex Screw, 25mm Length    BIOMET [id number] Right 1 Implanted and Explanted   BASEPLATE TIB STEM ROM 5DEG RT - [id number]  BASEPLATE TIB STEM ROM 5DEG RT  JOSE,INC [id number] Right 1 Implanted   Persona The Personalized Knee System Vivacit-E Highly Crosslinked Poly, Right 10mm    BIOMET [id number] Right 1 Implanted   PSN ALL POLY PAT 35MM - [id number]  PSN ALL POLY PAT 35MM  JOSE,INC [id number] Right 1 Implanted   COMPONENT FEM CR PERSONA 11 R - [id number]  COMPONENT FEM CR PERSONA 11 R  JOSE,INC [id number] Right 1 Implanted        Specimens:   Specimen (24h ago, onward)      None                    Condition: Good    Disposition: PACU - hemodynamically stable.    Attestation: I was present for the entire procedure.    Procedure In Detail:    The patient was brought to the operating room and place on the table in the supine position.  The patient underwent general endotracheal intubation without complication.  The Right lower extremity was prepped and draped in the normal sterile fashion.  An echmark was used to exsanguinate the limb and the tourniquet was taken up to 250mm of mercury.    A midline skin incision was made and dissection was taken down to the paratenon which was incised and reflected medially.  A mid-vastus parapatellar arthrotomy was then created.  The knee was placed in extension and the patella was everted 90 degrees.  Part of the patellar fat pad was resected and dissection was taken medially to the level of the MCL.  The patellar cut was made and the patella was sized.  It sized out at a 32mm.  A 32mm trial was placed and hohmann retractors were placed medially and laterally.  The knee was flexed and the patella was allowed to sublux over the lateral femoral condyle.  This led to excellent exposure of the knee joint which was noted to be severely arthritic.  The ACL was resected.  A drill bit was used to open the distal femur and an intramedullary guide was used to place the distal femoral cutting block.  The cutting block was pinned into place and the guide was removed.  The distal femoral cut was made and the cutting block was removed.  The femur was then sized and it sized out at a size 11.  Using posterior referencing but verifying the rotation with the epicondylar axis, a size 11 cutting block was then pinned to the distal femur and the anterior, posterior, and chamfer cuts were made.  The cutting block was removed.  A PCL retractor was then placed and the tibia was allowed to translate anteriorly.    The menisci  were resected and a drill bit was used to open the proximal tibia.  An intramedullary guide was used to place a cutting block on the proximal tibia.  The block was pinned into place and the guide was removed.  The proxima tibial cut was then made.  The tibia was sized and it sized out at a size G.  A size G trial was placed on the tibia and a size 11 trial was placed on the femur.  A 10mm polyethylene was placed and with those components in place the knee was noted to have full extension, flexion to 150 degres  with balanced flexion and extension gaps and balanced varus/valgus stability.  This was felt to be good construct.  The trial poly was removed and the femoral and tibial trial were punched to accept the final components, then they were removed.    While the final components were opened, cement was mixed on the back table and the knee was irrigated with 3 Liters of normal saline in a pulsatile lavage.  When the cement had reached an appropriate consistency, the tibial and femoral components were cemented and impacted into place.  The trial tibial tray was placed on the tibial tray and the knee was brought into full extension.  The patellar button was then cemented and clamped in place.  The knee was then placed in 35 degrees of flexion until the cement completely hardened.  The trial poly was then removed. Excess cement was removed with a stiletto osteotome.  The final highly cross linked poly was then snapped into place on the tibial tray.  With the final components in place the knee had the same range of motion and stability as it did with the trials.  This was felt to be a very good construct.    The knee was then placed in 35 degrees of flexion and closure was begun.  The arthrotomy was closed with a combination of #5 Ethibond and a 0 PDS Stratafix suture.  We then closed in a layered fashion using 2-0 PDS Stratafix, a 3-0 Monocryl Stratafix, and a Dermabond Preneo device on the skin.  A sterile  intra-operative occlusive dressing was place along with a  [facility] care device for post operative pain controll.  The patient was then awakened from anesthesia and taken to recovery where they were noted to be stable post-operatively.  Needle and lap counts were correct at the end of the case.

## 2023-11-15 NOTE — PLAN OF CARE
Problem: Physical Therapy  Goal: Physical Therapy Goal  Description: Goals to be met by: 2023     Patient will increase functional independence with mobility by performin. Supine to sit with Contact Guard Assistance  2. Sit to stand transfer with Contact Guard Assistance  3. Bed to chair transfer with Contact Guard Assistance using Rolling Walker  4. Gait  x 200 feet with Contact Guard Assistance using Rolling Walker.   5. Lower extremity exercise program x20 reps  Outcome: Ongoing, Progressing   Pt seen at post 06.  Pt seen for brief thera ex in supine, AP,GS and QS.  OOB and gait training deferred till cleared by  Dr Lockhart . Pt with noted edema of RK. Nurse Fe aware

## 2023-11-15 NOTE — ANESTHESIA PROCEDURE NOTES
Spinal    Diagnosis: right knee arthroplasty  Patient location during procedure: OR  Start time: 11/15/2023 2:22 PM  Timeout: 11/15/2023 2:22 PM  End time: 11/15/2023 2:32 PM    Staffing  Authorizing Provider: Willie Orozco MD  Performing Provider: Willie Orozco MD    Staffing  Performed by: Willie Orozco MD  Authorized by: Willie Orozco MD    Preanesthetic Checklist  Completed: patient identified, IV checked, site marked, risks and benefits discussed, surgical consent, monitors and equipment checked, pre-op evaluation and timeout performed  Spinal Block  Patient position: sitting  Prep: Betadine and ChloraPrep  Patient monitoring: heart rate, cardiac monitor, continuous pulse ox, continuous capnometry and frequent blood pressure checks  Approach: midline  Location: L2-3  Injection technique: single shot  CSF Fluid: clear free-flowing CSF  Needle  Needle type: Quincke   Needle gauge: 25 G  Needle length: 4 in  Additional Documentation: incremental injection, negative aspiration for heme and no paresthesia on injection  Needle localization: anatomical landmarks  Assessment  Sensory level: T4   Dermatomal levels determined by pinch or prick  Ease of block: moderate  Patient's tolerance of the procedure: comfortable throughout block  Additional Notes  +barbotage    After bloody tap at L3-4.    Medications:    Medications: bupivacaine (pf) (MARCAINE) injection 0.75% - Intraspinal   1.6 mL - 11/15/2023 2:32:00 PM

## 2023-11-15 NOTE — ANESTHESIA PROCEDURE NOTES
Right Adductor Canal    Patient location during procedure: pre-op   Block not for primary anesthetic.  Reason for block: at surgeon's request and post-op pain management   Post-op Pain Location: right knee pain   Start time: 11/15/2023 12:50 PM  Timeout: 11/15/2023 12:50 PM   End time: 11/15/2023 12:55 PM    Staffing  Authorizing Provider: Willie Orozco MD  Performing Provider: Willie Orozco MD    Staffing  Performed by: Willie Orozco MD  Authorized by: Willie Orozco MD    Preanesthetic Checklist  Completed: patient identified, IV checked, site marked, risks and benefits discussed, surgical consent, monitors and equipment checked, pre-op evaluation and timeout performed  Peripheral Block  Patient position: supine  Prep: ChloraPrep  Patient monitoring: heart rate, cardiac monitor, continuous pulse ox, continuous capnometry and frequent blood pressure checks  Block type: adductor canal  Laterality: right  Injection technique: single shot  Needle  Needle type: Stimuplex   Needle gauge: 21 G  Needle length: 4 in  Needle localization: anatomical landmarks and ultrasound guidance  Needle insertion depth: 5 cm   -ultrasound image captured on disc.  Assessment  Injection assessment: negative aspiration, negative parasthesia and local visualized surrounding nerve  Paresthesia pain: none  Heart rate change: no  Slow fractionated injection: yes  Pain Tolerance: comfortable throughout block and no complaints  Medications:    Medications: bupivacaine (pf) (MARCAINE) injection 0.5% - Perineural   10 mL - 11/15/2023 1:18:00 PM    Additional Notes  VSS.  [facility] RN monitoring vitals throughout procedure.  Patient tolerated procedure well.     Exparel 20ml + Marcaine 0.5% 10ml dosed under direct Ultrasound guidance.

## 2023-11-16 VITALS
RESPIRATION RATE: 12 BRPM | BODY MASS INDEX: 33.45 KG/M2 | OXYGEN SATURATION: 100 % | SYSTOLIC BLOOD PRESSURE: 135 MMHG | HEIGHT: 72 IN | WEIGHT: 246.94 LBS | TEMPERATURE: 98 F | HEART RATE: 66 BPM | DIASTOLIC BLOOD PRESSURE: 75 MMHG

## 2023-11-16 PROCEDURE — G0180 MD CERTIFICATION HHA PATIENT: HCPCS | Mod: ,,, | Performed by: ORTHOPAEDIC SURGERY

## 2023-11-16 PROCEDURE — G0180 PR HOME HEALTH MD CERTIFICATION: ICD-10-PCS | Mod: ,,, | Performed by: ORTHOPAEDIC SURGERY

## 2023-11-16 NOTE — PLAN OF CARE
Patient cleared by PT to discharge to home.  Patient and spouse voiced readiness to discharge to home.  Patient able to void with no problems noted.  Dressing to right knee remains clean, dry and intact.  Medications delivered to spouse.   Discharge instructions given to pt and family/friend, verbalized understanding and questions answered. Handouts provided. Belongings given back to pt. IV removed- catheter intact. Discharge via wheelchair.

## 2023-11-16 NOTE — PT/OT/SLP EVAL
Physical Therapy Evaluation    Patient Name:  David Aguilera   MRN:  [id number]    Recommendations:     Discharge Recommendations: Low Intensity Therapy   Discharge Equipment Recommendations: none (has RW)   Barriers to discharge: None    Assessment:     David Aguilera is a 58 y.o. male admitted with a medical diagnosis of <principal problem not specified>.  He presents with the following impairments/functional limitations: weakness, impaired endurance, impaired sensation, impaired functional mobility, decreased lower extremity function, pain, decreased ROM, orthopedic precautions, edema     Pt seen at post 06, sleepy- awakened with stimulation. Pt c/o hypoesthesia inner thighs. Pt completed brief thera ex in supine . Pt with noted edema RK. Pt c/o pain. Pt unable to progress to OOB.    Rehab Prognosis: Fair; patient would benefit from acute skilled PT services to address these deficits and reach maximum level of function.    Recent Surgery: Procedure(s) (LRB):  ARTHROPLASTY, KNEE, TOTAL (Right) Day of Surgery    Plan:     During this hospitalization, patient to be seen BID to address the identified rehab impairments via gait training, therapeutic activities, therapeutic exercises and progress toward the following goals:    Plan of Care Expires:  11/30/23    Subjective   Spouse at bedside and concern about RK edema  Chief Complaint: numbness inner thighs, pain RK  Patient/Family Comments/goals: none stated  Pain/Comfort:  Pain Rating 1: 8/10  Location - Side 1: Right  Location 1: knee  Pain Addressed 1: Pre-medicate for activity, Reposition, Distraction, Cessation of Activity    Patients cultural, spiritual, Church conflicts given the current situation:      Living Environment:  Home with spouse  Prior to admission, patients level of function was indep.  Equipment used at home: none.  DME owned (not currently used): rolling walker.  Upon discharge, patient will have assistance from family.    Objective:      Communicated with nurse Miramontes prior to session.  Patient found HOB elevated with    upon PT entry to room.    General Precautions: Standard, fall  Orthopedic Precautions:RLE weight bearing as tolerated (per orders)   Braces: N/A  Respiratory Status: Room air    Exams:  RLE ROM: Deficits: not tested due to edema RK  RLE Strength: R ankle 3/5  LLE ROM: WFL  LLE Strength: Deficits: 3-/5    Functional Mobility:  Bed Mobility:     Scooting: moderate assistance      AM-PAC 6 CLICK MOBILITY  Total Score:7       Treatment & Education:  Patient was educated on the importance of OOB activity and functional mobility to negate negative effects of prolonged bed rest during hospitalization, safe transfers and ambulation, and D/C planning   Thera ex in supine with AP,GS and QS  Unable to progress OOB due to pain, edema and hypoesthesia    Patient left HOB elevated with all lines intact, call button in reach, nurse Fe notified, and spouse present.    GOALS:   Multidisciplinary Problems       Physical Therapy Goals          Problem: Physical Therapy    Goal Priority Disciplines Outcome Goal Variances Interventions   Physical Therapy Goal     PT, PT/OT Ongoing, Progressing     Description: Goals to be met by: 2023     Patient will increase functional independence with mobility by performin. Supine to sit with Contact Guard Assistance  2. Sit to stand transfer with Contact Guard Assistance  3. Bed to chair transfer with Contact Guard Assistance using Rolling Walker  4. Gait  x 200 feet with Contact Guard Assistance using Rolling Walker.   5. Lower extremity exercise program x20 reps                       History:     Past Medical History:   Diagnosis Date    ADD (attention deficit disorder with hyperactivity) 04/15/2013    GERD (gastroesophageal reflux disease)     Hyperlipidemia     Hypertension     Hypogonadism in male     Insomnia     Secondary polycythemia        Past Surgical History:   Procedure  Laterality Date    ARTHROSCOPIC CHONDROPLASTY OF KNEE JOINT Right 10/10/2022    Procedure: ARTHROSCOPY, KNEE, WITH CHONDROPLASTY;  Surgeon: Herbie Hoyt MD;  Location: [facility] OR;  Service: Orthopedics;  Laterality: Right;    COLONOSCOPY N/A 11/06/2018    Dr. Chadwick; unremarkable; repeat in 10 years    EXCISION OF MEDIAL MENISCUS OF KNEE Right 10/10/2022    Procedure: MENISCECTOMY, KNEE, MEDIAL;  Surgeon: Herbie Hoyt MD;  Location: [facility] OR;  Service: Orthopedics;  Laterality: Right;    HERNIA REPAIR  2000    L knee scope      L shoulder surgery      VASECTOMY         Time Tracking:     PT Received On: 11/15/23  PT Start Time: 1729     PT Stop Time: 1738  PT Total Time (min): 9 min     Billable Minutes: Evaluation 9      11/15/2023

## 2023-11-16 NOTE — PT/OT/SLP PROGRESS
Physical Therapy Treatment    Patient Name:  David Aguilera   MRN:  [id number]    Recommendations:     Discharge Recommendations: Low Intensity Therapy  Discharge Equipment Recommendations: none (has RW)  Barriers to discharge: None    Assessment:     aDvid Aguilera is a 58 y.o. male admitted with a medical diagnosis of <principal problem not specified>.  He presents with the following impairments/functional limitations: weakness, impaired endurance, impaired sensation, impaired functional mobility, decreased lower extremity function, pain, decreased ROM, orthopedic precautions, edema .    Pt seen 2x this pm post 06. Dr Lockhart cleared pt for PT with WBAT. Spouse at bedside encouraging pt. Pt c/o pain RK but agreeable to try. Min assist to sit EOB and to stand with RW  cueing for technique and safety. Pt  ambulated 200ft with RW with another person following with chair. Pt did not require sitting rest. Up in chair post PT and reclined.  Pt and spouse provided with gait belt and educated on use and purpose..    Rehab Prognosis: Good; patient would benefit from acute skilled PT services to address these deficits and reach maximum level of function.    Recent Surgery: Procedure(s) (LRB):  ARTHROPLASTY, KNEE, TOTAL (Right) Day of Surgery    Plan:     During this hospitalization, patient to be seen BID to address the identified rehab impairments via gait training, therapeutic activities, therapeutic exercises and progress toward the following goals:    Plan of Care Expires:  11/30/23    Subjective   Pt stated of being hungry but refusing offer for sandwich- wanting cookies instead. Spouse able to obtain.  Chief Complaint: pain and numbness  RLE  Patient/Family Comments/goals: none stated  Pain/Comfort:  Pain Rating 1: other (see comments) (12/10)  Location - Side 1: Right  Location 1: knee  Pain Addressed 1: Reposition, Distraction, Nurse notified      Objective:     Communicated with nurse Miramontes prior to session.  Patient  found HOB elevated with   upon PT entry to room.     General Precautions: Standard, fall  Orthopedic Precautions: RLE weight bearing as tolerated (per orders)  Braces: N/A  Respiratory Status: Room air     Functional Mobility:  Bed Mobility:     Scooting: minimum assistance  Supine to Sit: minimum assistance  Transfers:     Sit to Stand:  minimum assistance with rolling walker  Bed to Chair: minimum assistance with  rolling walker  using  Stand Pivot  Gait: 200ft with RW min assist with lots of encouragement from spouse. VC for technique and safety      AM-PAC 6 CLICK MOBILITY  Turning over in bed (including adjusting bedclothes, sheets and blankets)?: 3  Sitting down on and standing up from a chair with arms (e.g., wheelchair, bedside commode, etc.): 3  Moving from lying on back to sitting on the side of the bed?: 3  Moving to and from a bed to a chair (including a wheelchair)?: 3  Need to walk in hospital room?: 3  Climbing 3-5 steps with a railing?: 1  Basic Mobility Total Score: 16       Treatment & Education:  Patient was educated on the importance of OOB activity and functional mobility to negate negative effects of prolonged bed rest during hospitalization, safe transfers and ambulation, and D/C planning   OOB chair post PT  Bathroom offered but has no urge yet    Patient left up in chair with all lines intact, call button in reach, and nurse Fe and spouse present..    GOALS:   Multidisciplinary Problems       Physical Therapy Goals          Problem: Physical Therapy    Goal Priority Disciplines Outcome Goal Variances Interventions   Physical Therapy Goal     PT, PT/OT Ongoing, Progressing     Description: Goals to be met by: 2023     Patient will increase functional independence with mobility by performin. Supine to sit with Contact Guard Assistance  2. Sit to stand transfer with Contact Guard Assistance  3. Bed to chair transfer with Contact Guard Assistance using Rolling Walker  4. Gait   x 200 feet with Contact Guard Assistance using Rolling Walker.   5. Lower extremity exercise program x20 reps                       Time Tracking:     PT Received On: 11/15/23  PT Start Time: 1825     PT Stop Time: 1848  PT Total Time (min): 23 min     Billable Minutes: Gait Training 23    Treatment Type: Treatment  PT/PTA: PT     Number of PTA visits since last PT visit: 0     11/15/2023

## 2023-11-16 NOTE — PROGRESS NOTES
11/16/23 Very pleased with care given.  States all staff were excellent and helpful. States he has read and  understands all discharge instructions.

## 2023-11-16 NOTE — PLAN OF CARE
PT concerned with how the knee looks and not comfortable with getting him up.  Dr Lockhart at bedside and is ok with how the knee looks and is ok with patient working with PT.  PT notified.

## 2023-11-24 DIAGNOSIS — M17.11 UNILATERAL PRIMARY OSTEOARTHRITIS, RIGHT KNEE: Primary | ICD-10-CM

## 2023-11-28 ENCOUNTER — OFFICE VISIT (OUTPATIENT)
Dept: ORTHOPEDICS | Facility: CLINIC | Age: 58
End: 2023-11-28
Payer: COMMERCIAL

## 2023-11-28 ENCOUNTER — HOSPITAL ENCOUNTER (OUTPATIENT)
Dept: RADIOLOGY | Facility: HOSPITAL | Age: 58
Discharge: HOME | End: 2023-11-28
Attending: ORTHOPAEDIC SURGERY
Payer: COMMERCIAL

## 2023-11-28 VITALS — HEIGHT: 72 IN | BODY MASS INDEX: 33.45 KG/M2 | WEIGHT: 246.94 LBS

## 2023-11-28 DIAGNOSIS — M17.11 UNILATERAL PRIMARY OSTEOARTHRITIS, RIGHT KNEE: Primary | ICD-10-CM

## 2023-11-28 DIAGNOSIS — M17.11 UNILATERAL PRIMARY OSTEOARTHRITIS, RIGHT KNEE: ICD-10-CM

## 2023-11-28 DIAGNOSIS — Z96.651 HISTORY OF TOTAL RIGHT KNEE REPLACEMENT: ICD-10-CM

## 2023-11-28 PROCEDURE — 99024 PR POST-OP FOLLOW-UP VISIT: ICD-10-PCS | Mod: S$GLB,,, | Performed by: ORTHOPAEDIC SURGERY

## 2023-11-28 PROCEDURE — 99999 PR PBB SHADOW E&M-EST. PATIENT-LVL III: ICD-10-PCS | Mod: PBBFAC,,, | Performed by: ORTHOPAEDIC SURGERY

## 2023-11-28 PROCEDURE — 1159F MED LIST DOCD IN RCRD: CPT | Mod: CPTII,S$GLB,, | Performed by: ORTHOPAEDIC SURGERY

## 2023-11-28 PROCEDURE — 1160F PR REVIEW ALL MEDS BY PRESCRIBER/CLIN PHARMACIST DOCUMENTED: ICD-10-PCS | Mod: CPTII,S$GLB,, | Performed by: ORTHOPAEDIC SURGERY

## 2023-11-28 PROCEDURE — 73560 XR KNEE 1 OR 2 VIEW RIGHT: ICD-10-PCS | Mod: 26,RT,, | Performed by: RADIOLOGY

## 2023-11-28 PROCEDURE — 3044F HG A1C LEVEL LT 7.0%: CPT | Mod: CPTII,S$GLB,, | Performed by: ORTHOPAEDIC SURGERY

## 2023-11-28 PROCEDURE — 1160F RVW MEDS BY RX/DR IN RCRD: CPT | Mod: CPTII,S$GLB,, | Performed by: ORTHOPAEDIC SURGERY

## 2023-11-28 PROCEDURE — 73560 X-RAY EXAM OF KNEE 1 OR 2: CPT | Mod: 26,RT,, | Performed by: RADIOLOGY

## 2023-11-28 PROCEDURE — 99024 POSTOP FOLLOW-UP VISIT: CPT | Mod: S$GLB,,, | Performed by: ORTHOPAEDIC SURGERY

## 2023-11-28 PROCEDURE — 1159F PR MEDICATION LIST DOCUMENTED IN MEDICAL RECORD: ICD-10-PCS | Mod: CPTII,S$GLB,, | Performed by: ORTHOPAEDIC SURGERY

## 2023-11-28 PROCEDURE — 73560 X-RAY EXAM OF KNEE 1 OR 2: CPT | Mod: TC,PO,RT

## 2023-11-28 PROCEDURE — 99999 PR PBB SHADOW E&M-EST. PATIENT-LVL III: CPT | Mod: PBBFAC,,, | Performed by: ORTHOPAEDIC SURGERY

## 2023-11-28 PROCEDURE — 3044F PR MOST RECENT HEMOGLOBIN A1C LEVEL <7.0%: ICD-10-PCS | Mod: CPTII,S$GLB,, | Performed by: ORTHOPAEDIC SURGERY

## 2023-11-28 RX ORDER — IBUPROFEN 800 MG/1
800 TABLET ORAL 3 TIMES DAILY
Qty: 90 TABLET | Refills: 2 | Status: SHIPPED | OUTPATIENT
Start: 2023-11-28 | End: 2024-02-26

## 2023-11-28 RX ORDER — HYDROCODONE BITARTRATE AND ACETAMINOPHEN 7.5; 325 MG/1; MG/1
1 TABLET ORAL EVERY 6 HOURS PRN
Qty: 28 TABLET | Refills: 0 | Status: SHIPPED | OUTPATIENT
Start: 2023-11-28 | End: 2024-01-25 | Stop reason: SDUPTHER

## 2023-11-28 NOTE — PROGRESS NOTES
CC:  58-year-old male follows up status post right total knee arthroplasty.  Date of surgery was 11/15/2023.  This is his 2 week follow-up.  Overall is doing well.    RLE:  Incision is clean, dry, and intact.  Healing well with no sign of infection  Grossly intact motor sensory function distally  Plus 2 distal pulses  Range of motion 0-90    X-ray images were examined and personally interpreted by me.  Two views of the right knee dated 11/28/2023 show a right total knee arthroplasty that is well fixed and in good alignment.    Dx:  Status post right total knee arthroplasty, stable    Plan:  Progress activity as tolerated  Outpatient PT referral made  Follow up in 4 weeks with an x-ray

## 2023-12-01 ENCOUNTER — CLINICAL SUPPORT (OUTPATIENT)
Dept: REHABILITATION | Facility: HOSPITAL | Age: 58
End: 2023-12-01
Attending: ORTHOPAEDIC SURGERY
Payer: COMMERCIAL

## 2023-12-01 ENCOUNTER — EXTERNAL HOME HEALTH (OUTPATIENT)
Dept: HOME HEALTH SERVICES | Facility: HOSPITAL | Age: 58
End: 2023-12-01
Payer: COMMERCIAL

## 2023-12-01 DIAGNOSIS — M25.661 DECREASED RANGE OF MOTION (ROM) OF RIGHT KNEE: Primary | ICD-10-CM

## 2023-12-01 DIAGNOSIS — Z96.651 HISTORY OF TOTAL RIGHT KNEE REPLACEMENT: ICD-10-CM

## 2023-12-01 PROCEDURE — 97112 NEUROMUSCULAR REEDUCATION: CPT | Mod: PN

## 2023-12-01 PROCEDURE — 97110 THERAPEUTIC EXERCISES: CPT | Mod: PN

## 2023-12-01 PROCEDURE — 97161 PT EVAL LOW COMPLEX 20 MIN: CPT | Mod: PN

## 2023-12-01 NOTE — PLAN OF CARE
[facility]  Physical Therapy Initial Evaluation    Date: 12/1/2023   Name: David Aguilera  Clinic Number: [id number]    Therapy Diagnosis:   Encounter Diagnoses   Name Primary?    History of total right knee replacement     Decreased range of motion (ROM) of right knee Yes     Physician: Navarro Lockhart II, MD    Physician Orders: PT Eval and Treat   Medical Diagnosis from Referral: Z96.651 (ICD-10-CM) - History of total right knee replacement  Evaluation Date: 12/1/2023  Authorization Period Expiration: 12/29/2023  Plan of Care Expiration: 1/26/2024  Visit # / Visits authorized: 1/ 1    Time In: 910 am  Time Out: 957 am  Total Appointment Time (timed & untimed codes): 47 minutes    Precautions: Standard    Date of Procedure: 11/15/2023   Procedure: Procedure(s) (LRB):  ARTHROPLASTY, KNEE, TOTAL (Right)     Subjective   Date of onset: 11/15/2023  History of current condition - David reports: right knee pain was ongoing for years until 1 day his leg gave out and he wanted to just go ahead and get it replaced. 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 He states he has been walking fine. States knee pain is minimal but quad and hamstring are really sore. He denies any history of pacemaker/infection/CI to e-stim.      Medical History:   Past Medical History:   Diagnosis Date    ADD (attention deficit disorder with hyperactivity) 04/15/2013    GERD (gastroesophageal reflux disease)     Hyperlipidemia     Hypertension     Hypogonadism in male     Insomnia     Secondary polycythemia        Surgical History:   David Aguilera  has a past surgical history that includes Vasectomy; L knee scope; L shoulder surgery; Colonoscopy (N/A, 11/06/2018); Hernia repair (2000); Excision of medial meniscus of knee (Right, 10/10/2022); Arthroscopic chondroplasty of knee joint (Right, 10/10/2022); and Total knee arthroplasty (Right, 11/15/2023).    Medications:   David  has a current medication list which includes the following prescription(s): ambien, aspirin, amphetamine salt combo, escitalopram oxalate, hydrocodone-acetaminophen, ibuprofen, ondansetron, and testosterone cypionate.    Allergies:   Review of patient's allergies indicates:  No Known Allergies     Imaging, per MD note: X-ray images were examined and personally interpreted by me.  Two views of the right knee dated 11/28/2023 show a right total knee arthroplasty that is well fixed and in good alignment.    Prior Therapy: Y; HH  Social History:  lives alone  Occupation: costruction  Prior Level of Function: I  Current Level of Function: Mod I with SPC    Pain:  Current 2/10, worst 8/10, best 2/10   Location: right knee  Description: Aching and Tight  Aggravating Factors: Standing and Walking  Easing Factors: rest    Patients goals: get back to work/full function    Objective   Gait:   Assistive device  - SPC  Decreased knee EXT    Decreased step length    Decreased hip EXT     Observation: calm and cooperative; incision site covered with steri-strip, moderate knee swelling      Range of Motion:   Knee Left active Left Passive Right Active R passive   Flexion 135 140 80 85   Extension 0 +2 -7; painful -5; painful       Joint Mobility:  (R) patellar hypomobility in all directions  Tibiofemoral: R: empty end-feel     Lower Extremity Strength  Quad Set: fair   SLR: unable without lag  5xSTS: 13s with no UE support; L trunk lean         Functional Testing:   TUG - 12.7s with SPC     Table: Population Norms for TUG    Age  Average TUG    60 - 69 years  8.1 seconds    70 - 79 years  9.2 seconds    80 - 99 years  11.3 seconds      Sensation: WNL    Flexibility: tight HS    Limitation/Restriction for FOTO Knee Survey    Therapist reviewed FOTO scores for David A Ale on 12/1/2023.   FOTO documents entered into EPIC - see Media section.    Limitation Score: see media         TREATMENT   Treatment Time In: 932am  Treatment  Time Out: 957am  Total Treatment time (time-based codes) separate from Evaluation: 25 minutes    David received therapeutic exercises to develop strength, endurance, ROM, and flexibility for 17 minutes including:      Supine Heel prop x 5 minutes   Seated HS stretch 2 x 30s     Seated LAQ 2 x 10; 3s eccentric    Education - use SPC / stretching into extension followed by quad sets every hour        David received the following neuromuscular reeducation: sense/kinesthesia/proprioception for 8 minutes, including:      NMES: Quad set; 10s on / 10s off - 8 minutes      Home Exercises and Patient Education Provided    Education provided:   - HEP  - POC/prognosis    Written Home Exercises Provided: yes.  Exercises were reviewed and David was able to demonstrate them prior to the end of the session.  David demonstrated good  understanding of the education provided.     See EMR under Patient Instructions for exercises provided 12/1/2023.    Assessment   David is a 58 y.o. male referred to outpatient Physical Therapy with a medical diagnosis of History of total right knee replacement. Pt presents with limited knee AROM; LE weakness; tightness of HS, and functional limitations of difficulty walking and doing functional tasks. Patient would benefit from skilled PT to address these impairments and facilitate a return to PLOF. Limiting time on eval today due to patient's late arrival and he needed to take a phone call during session.     Pt to be seen 2x/week for 8x weeks     Patient prognosis is Good.   Patientt will benefit from skilled outpatient Physical Therapy to address the deficits stated above and in the chart below, provide patient /family education, and to maximize patientt's level of independence.     Plan of care discussed with patient: Yes  Patient's spiritual, cultural and educational needs considered and patient is agreeable to the plan of care and goals as stated below:     Anticipated Barriers for therapy:  none    Medical Necessity is demonstrated by the following  History  Co-morbidities and personal factors that may impact the plan of care Co-morbidities:   high BMI and HTN    Personal Factors:   no deficits     low   Examination  Body Structures and Functions, activity limitations and participation restrictions that may impact the plan of care Body Regions:   lower extremities    Body Systems:    gross symmetry  ROM  strength  gross coordinated movement  gait  transfers  motor control    Participation Restrictions:   work    Activity limitations:   no deficits    General Tasks and Commands  no deficits    Communication  no deficits    Mobility  lifting and carrying objects  walking    Self care  dressing    Domestic Life  doing house work (cleaning house, washing dishes, laundry)  assisting others    Interactions/Relationships  no deficits    Life Areas  employment    Community and Social Life  community life  recreation and leisure         low   Clinical Presentation stable and uncomplicated low   Decision Making/ Complexity Score: low     Goals:  Short Term Goals: (4 weeks)  1. Pt will be independent with HEP in order to supplement patient in improving functional mobility. - progressing, not met  2. Pt will improve (R) knee AROM to at least 0-110 in order to improve gait. - progressing, not met  3. Pt will perform TUG in < 12 seconds without AD in order to decrease risk of falling - progressing, not met  4. Pt will improve ability to do a quad set to good and be able to complete SLR with no lag - progressing, not met     Long Term Goals: (8 weeks)  1. Pt will be independent with updated HEP supplement PT in improving functional mobility. - progressing, not met  2. Pt will improve (R) knee AROM to at least 0-120 in order to improve gait and ability to perform ADLs. - progressing, not met  3. Pt will improve FOTO knee survey score to </= predicted % limited in order to demo improved functional mobility. -  progressing, not met  4. Pt will perform at least 10 sit to stands without UE support on 30 second sit to stand test in order to demo improved ability to perform transfers. - progressing, not met    Plan   Plan of care Certification: 12/1/2023 to 1/26/2024.    Outpatient Physical Therapy 2 times weekly for 8 weeks to include the following interventions: Electrical Stimulation quad, Gait Training, Manual Therapy, Moist Heat/ Ice, Neuromuscular Re-ed, Patient Education, Self Care, Therapeutic Activities, and Therapeutic Exercise.     Vitaly Bush, PT

## 2023-12-04 NOTE — PROGRESS NOTES
[facility]   Physical Therapy Treatment Note      Name: David ADLER [facility] Number: [id number]    Therapy Diagnosis:   Encounter Diagnosis   Name Primary?    Decreased range of motion (ROM) of right knee Yes     Physician: Navarro Lockhart II, MD    Visit Date: 12/5/2023    Physician Orders: PT Eval and Treat   Medical Diagnosis from Referral: Z96.651 (ICD-10-CM) - History of total right knee replacement  Evaluation Date: 12/1/2023  Authorization Period Expiration: 12/29/2023  Plan of Care Expiration: 1/26/2024  Visit # / Visits authorized: 1/ 20     Time In: 907 am  Time Out: 1000 am  Total Appointment Time (timed & untimed codes): 53 minutes     Precautions: Standard     Date of Procedure: 11/15/2023   Procedure: Procedure(s) (LRB):  ARTHROPLASTY, KNEE, TOTAL (Right)   PTA Visit #: 1/5       Subjective     Patient reports: swelling and still tough to straighten his leg fully.   He was compliant with home exercise program.  Response to previous treatment: no issues   Functional change: walking more     Pain: 4/10  Location: right knee      Objective      Objective Measures updated at progress report unless specified.     Treatment     David received the treatments listed below:      David received therapeutic exercises to develop strength, endurance, ROM, and flexibility for 45 minutes including:                            Supine Heel prop x 5 minutes   Supine Short Arc Quads 30x 3 sec hold   Supine Straight Leg Raise 3 x 10    Supine heel sides with strap 3 x 10                  David received the following neuromuscular reeducation: sense/kinesthesia/proprioception for 15 minutes, including:                            NMES: Quad set; 10s on / 10s off - 0 minutes-- not today    Supine hip adduction 3 x 10               Seated HS stretch 2 x 30s                    Seated LAQ 2 x 10; 3s eccentric    Standing Gastroc stretch incline 2 x 30s         therapeutic activities to improve  functional performance for  0 minutes, including:          Patient Education and Home Exercises       Education provided:   - home exercises, gait mechanics     Written Home Exercises Provided: Patient instructed to cont prior HEP. Exercises were reviewed and David was able to demonstrate them prior to the end of the session.  David demonstrated good  understanding of the education provided. See Electronic Medical Record under Patient Instructions for exercises provided during therapy sessions    Assessment     Improved quad activation this session with continued swelling and decreased patella and scar mobility. Minor loss of stability noted and encouraged patient to not pivot with knee. Continue to progress range of motion as tolerated along with strength.     David Is progressing well towards his goals.   Patient prognosis is Good.     Patient will continue to benefit from skilled outpatient physical therapy to address the deficits listed in the problem list box on initial evaluation, provide pt/family education and to maximize pt's level of independence in the home and community environment.     Patient's spiritual, cultural and educational needs considered and pt agreeable to plan of care and goals.     Anticipated barriers to physical therapy: none     Goals:   Short Term Goals: (4 weeks)  1. Pt will be independent with HEP in order to supplement patient in improving functional mobility. - progressing, not met  2. Pt will improve (R) knee AROM to at least 0-110 in order to improve gait. - progressing, not met  3. Pt will perform TUG in < 12 seconds without AD in order to decrease risk of falling - progressing, not met  4. Pt will improve ability to do a quad set to good and be able to complete SLR with no lag - progressing, not met     Long Term Goals: (8 weeks)  1. Pt will be independent with updated HEP supplement PT in improving functional mobility. - progressing, not met  2. Pt will improve (R) knee AROM to  at least 0-120 in order to improve gait and ability to perform ADLs. - progressing, not met  3. Pt will improve FOTO knee survey score to </= predicted % limited in order to demo improved functional mobility. - progressing, not met  4. Pt will perform at least 10 sit to stands without UE support on 30 second sit to stand test in order to demo improved ability to perform transfers. - progressing, not met    Plan     Continue per Plan of Care     Mariella Dunn, PTA

## 2023-12-05 ENCOUNTER — CLINICAL SUPPORT (OUTPATIENT)
Dept: REHABILITATION | Facility: HOSPITAL | Age: 58
End: 2023-12-05
Payer: COMMERCIAL

## 2023-12-05 DIAGNOSIS — M25.661 DECREASED RANGE OF MOTION (ROM) OF RIGHT KNEE: Primary | ICD-10-CM

## 2023-12-05 PROCEDURE — 97110 THERAPEUTIC EXERCISES: CPT | Mod: PN,CQ

## 2023-12-05 PROCEDURE — 97112 NEUROMUSCULAR REEDUCATION: CPT | Mod: PN,CQ

## 2023-12-15 DIAGNOSIS — Z96.651 HISTORY OF TOTAL RIGHT KNEE REPLACEMENT: Primary | ICD-10-CM

## 2023-12-19 ENCOUNTER — CLINICAL SUPPORT (OUTPATIENT)
Dept: REHABILITATION | Facility: HOSPITAL | Age: 58
End: 2023-12-19
Payer: COMMERCIAL

## 2023-12-19 DIAGNOSIS — M25.661 DECREASED RANGE OF MOTION (ROM) OF RIGHT KNEE: Primary | ICD-10-CM

## 2023-12-19 PROCEDURE — 97530 THERAPEUTIC ACTIVITIES: CPT | Mod: PN

## 2023-12-19 PROCEDURE — 97110 THERAPEUTIC EXERCISES: CPT | Mod: PN

## 2023-12-19 PROCEDURE — 97112 NEUROMUSCULAR REEDUCATION: CPT | Mod: PN

## 2023-12-19 NOTE — PROGRESS NOTES
[facility]   Physical Therapy Treatment Note      Name: David ADLER [facility] Number: [id number]    Therapy Diagnosis:   Encounter Diagnosis   Name Primary?    Decreased range of motion (ROM) of right knee Yes       Physician: Navarro Lockhart II, MD    Visit Date: 12/19/2023    Physician Orders: PT Eval and Treat   Medical Diagnosis from Referral: Z96.651 (ICD-10-CM) - History of total right knee replacement  Evaluation Date: 12/1/2023  Authorization Period Expiration: 12/29/2023  Plan of Care Expiration: 1/26/2024  Visit # / Visits authorized: 2 / 20     Time In: 915 am (pt 12 minutes late upon arrival)  Time Out: 955 am  Total Appointment Time (timed & untimed codes): 40 minutes     Precautions: Standard     Date of Procedure: 11/15/2023   Procedure: Procedure(s) (LRB):  ARTHROPLASTY, KNEE, TOTAL (Right)   PTA Visit #: 1/5       Subjective     Patient reports: he has been working on straightening his leg at night especially. States he is really sore after doing so.    He was compliant with home exercise program.  Response to previous treatment: no issues   Functional change: walking more     Pain: 0/10  Location: right knee      Objective      ROM: -3 degrees extension; 0 degrees post stretching; 110 degree flexion  Quad set: fair +   SLR: 1-2 degree lag in supine    Treatment     David received the treatments listed below:      David received therapeutic exercises to develop strength, endurance, ROM, and flexibility for 20 minutes including:                 Assessment above   NuStep 6 minutes; Level 2.5 for ROM and endurance              Long sitting Heel prop x 3 minutes 3#   HS stretch long sitting 3 x 30 s     Standing Hip Abd 3# 3 x 10    Education - HEP / knee ext every hour              David received the following neuromuscular reeducation: sense/kinesthesia/proprioception for 10 minutes, including:                            NMES: Quad set; 10s on / 10s off - 4 minutes   NMES:  Seated Quad set+ SLR; 5s on / 10s off - 4 minutes   NMES: TKE GTB 5s on/ 10s off - 3 minutes              NMES: Seated LAQ 3 x 10; 3s on/8s off 10#, 3s eccentric     therapeutic activities to improve functional performance for 10 minutes, including:     Sit to Stands 24 inch Box 3 x 8    6 inch Step Ups 3 x 8       Patient Education and Home Exercises       Education provided:   - home exercises, gait mechanics     Written Home Exercises Provided: Patient instructed to cont prior HEP. Exercises were reviewed and David was able to demonstrate them prior to the end of the session.  David demonstrated good  understanding of the education provided. See Electronic Medical Record under Patient Instructions for exercises provided during therapy sessions    Assessment     David tolerated tx well. He continues to present with knee ext deficits. I reminded him to continue to stretch every hour into extension followed by quad sets to improve prognosis.     David Is progressing well towards his goals.   Patient prognosis is Good.     Patient will continue to benefit from skilled outpatient physical therapy to address the deficits listed in the problem list box on initial evaluation, provide pt/family education and to maximize pt's level of independence in the home and community environment.     Patient's spiritual, cultural and educational needs considered and pt agreeable to plan of care and goals.     Anticipated barriers to physical therapy: none     Goals:   Short Term Goals: (4 weeks)  1. Pt will be independent with HEP in order to supplement patient in improving functional mobility. - progressing, not met  2. Pt will improve (R) knee AROM to at least 0-110 in order to improve gait. - progressing, not met  3. Pt will perform TUG in < 12 seconds without AD in order to decrease risk of falling - progressing, not met  4. Pt will improve ability to do a quad set to good and be able to complete SLR with no lag - progressing,  not met     Long Term Goals: (8 weeks)  1. Pt will be independent with updated HEP supplement PT in improving functional mobility. - progressing, not met  2. Pt will improve (R) knee AROM to at least 0-120 in order to improve gait and ability to perform ADLs. - progressing, not met  3. Pt will improve FOTO knee survey score to </= predicted % limited in order to demo improved functional mobility. - progressing, not met  4. Pt will perform at least 10 sit to stands without UE support on 30 second sit to stand test in order to demo improved ability to perform transfers. - progressing, not met    Plan     Continue per Plan of Care     Vitaly Bush, PT

## 2023-12-21 ENCOUNTER — CLINICAL SUPPORT (OUTPATIENT)
Dept: REHABILITATION | Facility: HOSPITAL | Age: 58
End: 2023-12-21
Payer: COMMERCIAL

## 2023-12-21 DIAGNOSIS — M25.661 DECREASED RANGE OF MOTION (ROM) OF RIGHT KNEE: Primary | ICD-10-CM

## 2023-12-21 PROCEDURE — 97530 THERAPEUTIC ACTIVITIES: CPT | Mod: PN,CQ

## 2023-12-21 PROCEDURE — 97112 NEUROMUSCULAR REEDUCATION: CPT | Mod: PN,CQ

## 2023-12-21 PROCEDURE — 97110 THERAPEUTIC EXERCISES: CPT | Mod: PN,CQ

## 2023-12-21 NOTE — PROGRESS NOTES
[facility]   Physical Therapy Treatment Note      Name: David ADLER [facility] Number: [id number]    Therapy Diagnosis:   Encounter Diagnosis   Name Primary?    Decreased range of motion (ROM) of right knee Yes       Physician: Navarro Lockhart II, MD    Visit Date: 12/21/2023    Physician Orders: PT Eval and Treat   Medical Diagnosis from Referral: Z96.651 (ICD-10-CM) - History of total right knee replacement  Evaluation Date: 12/1/2023  Authorization Period Expiration: 12/29/2023  Plan of Care Expiration: 1/26/2024  Visit # / Visits authorized: 3 / 20     Time In: 903  Time Out: 955 am  Total Appointment Time (timed & untimed codes): 53 minutes     Precautions: Standard     Date of Procedure: 11/15/2023   Procedure: Procedure(s) (LRB):  ARTHROPLASTY, KNEE, TOTAL (Right)   PTA Visit #: 1/5       Subjective     Patient reports: no issues upon arrival, working on the knee range of motion.    He was compliant with home exercise program.  Response to previous treatment: no issues   Functional change: less pain     Pain: 0/10  Location: right knee      Objective      ROM: -3 degrees extension; 0 degrees post stretching; 110 degree flexion  Quad set: fair +   SLR: 1-2 degree lag in supine    Treatment     David received the treatments listed below:      David received therapeutic exercises to develop strength, endurance, ROM, and flexibility for 8 minutes including:                            Long sitting Heel prop x 3 minutes 3#   HS stretch long sitting 3 x 30 s          David received the following neuromuscular reeducation: sense/kinesthesia/proprioception for 23 minutes, including:                            NMES: Quad set; 10s on / 10s off - 4 minutes   NMES: Seated Quad set+ SLR; 5s on / 10s off - 4 minutes   NMES: TKE GTB 5s on/ 10s off - 3 minutes              NMES: Seated LAQ 3 x 10; 3s on/8s off 10#, 3s eccentric     therapeutic activities to improve functional performance for 15  minutes, including:     Sit to Stands 18 inch Box 2 x 10    Shuttle 50# 3 x 10    6 inch Step Ups 3 x 8       Patient Education and Home Exercises       Education provided:   - home exercises, gait mechanics     Written Home Exercises Provided: Patient instructed to cont prior HEP. Exercises were reviewed and David was able to demonstrate them prior to the end of the session.  David demonstrated good  understanding of the education provided. See Electronic Medical Record under Patient Instructions for exercises provided during therapy sessions    Assessment     David challenged appropriately with exercises performed with focus on quad activation and knee extension. Improved patella mobility with NMES and mechanics during sit to stands from lower box. Continue to progress.     David Is progressing well towards his goals.   Patient prognosis is Good.     Patient will continue to benefit from skilled outpatient physical therapy to address the deficits listed in the problem list box on initial evaluation, provide pt/family education and to maximize pt's level of independence in the home and community environment.     Patient's spiritual, cultural and educational needs considered and pt agreeable to plan of care and goals.     Anticipated barriers to physical therapy: none     Goals:   Short Term Goals: (4 weeks)  1. Pt will be independent with HEP in order to supplement patient in improving functional mobility. - progressing, not met  2. Pt will improve (R) knee AROM to at least 0-110 in order to improve gait. - progressing, not met  3. Pt will perform TUG in < 12 seconds without AD in order to decrease risk of falling - progressing, not met  4. Pt will improve ability to do a quad set to good and be able to complete SLR with no lag - progressing, not met     Long Term Goals: (8 weeks)  1. Pt will be independent with updated HEP supplement PT in improving functional mobility. - progressing, not met  2. Pt will improve  (R) knee AROM to at least 0-120 in order to improve gait and ability to perform ADLs. - progressing, not met  3. Pt will improve FOTO knee survey score to </= predicted % limited in order to demo improved functional mobility. - progressing, not met  4. Pt will perform at least 10 sit to stands without UE support on 30 second sit to stand test in order to demo improved ability to perform transfers. - progressing, not met    Plan     Continue per Plan of Care     Mariella Dunn, PTA

## 2023-12-26 ENCOUNTER — OFFICE VISIT (OUTPATIENT)
Dept: ORTHOPEDICS | Facility: CLINIC | Age: 58
End: 2023-12-26
Payer: COMMERCIAL

## 2023-12-26 ENCOUNTER — HOSPITAL ENCOUNTER (OUTPATIENT)
Dept: RADIOLOGY | Facility: HOSPITAL | Age: 58
Discharge: HOME | End: 2023-12-26
Attending: ORTHOPAEDIC SURGERY
Payer: COMMERCIAL

## 2023-12-26 VITALS — WEIGHT: 246.94 LBS | BODY MASS INDEX: 33.45 KG/M2 | HEIGHT: 72 IN

## 2023-12-26 DIAGNOSIS — Z96.651 HISTORY OF TOTAL RIGHT KNEE REPLACEMENT: ICD-10-CM

## 2023-12-26 DIAGNOSIS — Z96.651 HISTORY OF TOTAL RIGHT KNEE REPLACEMENT: Primary | ICD-10-CM

## 2023-12-26 PROCEDURE — 99999 PR PBB SHADOW E&M-EST. PATIENT-LVL III: CPT | Mod: PBBFAC,,, | Performed by: ORTHOPAEDIC SURGERY

## 2023-12-26 PROCEDURE — 73564 X-RAY EXAM KNEE 4 OR MORE: CPT | Mod: TC,PO,RT

## 2023-12-26 PROCEDURE — 99999 PR PBB SHADOW E&M-EST. PATIENT-LVL III: ICD-10-PCS | Mod: PBBFAC,,, | Performed by: ORTHOPAEDIC SURGERY

## 2023-12-26 PROCEDURE — 99024 POSTOP FOLLOW-UP VISIT: CPT | Mod: S$GLB,,, | Performed by: ORTHOPAEDIC SURGERY

## 2023-12-26 PROCEDURE — 73564 XR KNEE ORTHO RIGHT WITH FLEXION: ICD-10-PCS | Mod: 26,RT,, | Performed by: RADIOLOGY

## 2023-12-26 PROCEDURE — 99024 PR POST-OP FOLLOW-UP VISIT: ICD-10-PCS | Mod: S$GLB,,, | Performed by: ORTHOPAEDIC SURGERY

## 2023-12-26 PROCEDURE — 3044F HG A1C LEVEL LT 7.0%: CPT | Mod: CPTII,S$GLB,, | Performed by: ORTHOPAEDIC SURGERY

## 2023-12-26 PROCEDURE — 3044F PR MOST RECENT HEMOGLOBIN A1C LEVEL <7.0%: ICD-10-PCS | Mod: CPTII,S$GLB,, | Performed by: ORTHOPAEDIC SURGERY

## 2023-12-26 PROCEDURE — 73562 X-RAY EXAM OF KNEE 3: CPT | Mod: 26,LT,, | Performed by: RADIOLOGY

## 2023-12-26 PROCEDURE — 73564 X-RAY EXAM KNEE 4 OR MORE: CPT | Mod: 26,RT,, | Performed by: RADIOLOGY

## 2023-12-26 PROCEDURE — 1159F PR MEDICATION LIST DOCUMENTED IN MEDICAL RECORD: ICD-10-PCS | Mod: CPTII,S$GLB,, | Performed by: ORTHOPAEDIC SURGERY

## 2023-12-26 PROCEDURE — 73562 XR KNEE ORTHO RIGHT WITH FLEXION: ICD-10-PCS | Mod: 26,LT,, | Performed by: RADIOLOGY

## 2023-12-26 PROCEDURE — 1159F MED LIST DOCD IN RCRD: CPT | Mod: CPTII,S$GLB,, | Performed by: ORTHOPAEDIC SURGERY

## 2023-12-26 NOTE — PROGRESS NOTES
CC:  58-year-old male follows up status post right total knee arthroplasty.  Date of surgery was 11/15/2023.  He is 6 weeks out.  Overall is doing well.  He currently rates his pain as a 4/10.  He is participating in physical therapy twice a week.  He does complain of cramping at night but states he does well during the day.    Examination of the Right Lower Extremity:     Skin intact throughout.  Motor function is intact distally EHL/FHL/TA/casper   +2 dorsalis pedis and posterior tibial pulses   Sensation to light touch intact distally dorsal, plantar, and first web space     Examination of the Right knee:    ROM 0 - 120   Effusion positive  Tenderness to palpation at the joint line positive  Pain during range of motion positive  Crepitation during range of motion negative     positive increased pain noted with flexion past 90   negative antalgic gait noted   negative Varus/Valgus instability    X-ray images were examined and personally interpreted by me.  Three views the right knee dated 12/26/2023 show a right total knee arthroplasty that is well fixed and in good alignment.    Dx:  Status post right total knee arthroplasty, stable    Plan:  The patient would like to do therapy 3 times a week and then twice a week and he did miss 3 visits because he had the flu.  We will contact therapy offices see if we can add those lost visits onto his weekly visits for the next 3 weeks.  We also discussed his spasms and we discussed taking potassium.  Follow up in 6 weeks with an x-ray.

## 2023-12-27 ENCOUNTER — CLINICAL SUPPORT (OUTPATIENT)
Dept: REHABILITATION | Facility: HOSPITAL | Age: 58
End: 2023-12-27
Payer: COMMERCIAL

## 2023-12-27 DIAGNOSIS — M25.661 DECREASED RANGE OF MOTION (ROM) OF RIGHT KNEE: Primary | ICD-10-CM

## 2023-12-27 PROCEDURE — 97530 THERAPEUTIC ACTIVITIES: CPT | Mod: PN

## 2023-12-27 PROCEDURE — 97112 NEUROMUSCULAR REEDUCATION: CPT | Mod: PN

## 2023-12-27 PROCEDURE — 97110 THERAPEUTIC EXERCISES: CPT | Mod: PN

## 2023-12-27 NOTE — PROGRESS NOTES
[facility]   Physical Therapy Treatment Note      [id number]    Therapy Diagnosis:   Encounter Diagnosis   Name Primary?    Decreased range of motion (ROM) of right knee Yes         Physician: Navarro Lockhart II, MD    Visit Date: 12/27/2023    Physician Orders: PT Eval and Treat   Medical Diagnosis from Referral: Z96.651 (ICD-10-CM) - History of total right knee replacement  Evaluation Date: 12/1/2023  Authorization Period Expiration: 12/29/2023  Plan of Care Expiration: 1/26/2024  Visit # / Visits authorized: 4 / 20     Time In: 907 am  Time Out: 1000 am  Total Appointment Time (timed & untimed codes): 53 minutes     Precautions: Standard     Date of Procedure: 11/15/2023   Procedure: Procedure(s) (LRB):  ARTHROPLASTY, KNEE, TOTAL (Right)   PTA Visit #: 1/5       Subjective     Patient reports: no issues upon arrival. States he saw his MD and states his X-rays were fine.     He was compliant with home exercise program.  Response to previous treatment: no issues   Functional change: less pain     Pain: 0/10  Location: right knee      Objective      ROM: -3 degrees extension; 0 degrees post stretching; 110 degree flexion  Quad set: fair +   SLR: 1-2 degree lag in supine    Treatment     David received the treatments listed below:      Physical Therapy technician assisted with treatment under direct supervision of treating therapist. Patient received 1:1 treatments for 15 minutes      David received therapeutic exercises to develop strength, endurance, ROM, and flexibility for 17 minutes including:                 Upright Bike 3 minutes; Level 2               Long sitting Heel prop x 3 minutes 3#   HS stretch long sitting 3 x 30 s     Supine SLR 2# 3 x 10      David received the following neuromuscular reeducation: sense/kinesthesia/proprioception for 12 minutes, including:                          TKE BTB 3 x 10; 3s holds               Seated LAQ 3 x  10; 3s on/8s off 10#, 3s eccentric    Backward Walking 5 x 10 yards     Not today:   NMES: Quad set; 10s on / 10s off - 4 minutes   NMES: Seated Quad set+ SLR; 5s on / 10s off - 4 minutes   NMES: TKE GTB 5s on/ 10s off - 3 minutes    Therapeutic activities to improve functional performance for 24 minutes, including:     Side Steps 3 x 10 yards RTB    DL Shuttle 100# 3 x 10    Shuttle 62# 3 x 10    6 inch Step Ups 3 x 8       Patient Education and Home Exercises       Education provided:   - home exercises, gait mechanics     Written Home Exercises Provided: Patient instructed to cont prior HEP. Exercises were reviewed and David was able to demonstrate them prior to the end of the session.  David demonstrated good  understanding of the education provided. See Electronic Medical Record under Patient Instructions for exercises provided during therapy sessions    Assessment     David continues to arrive to PT with lack of TKE but he regains it within session. He continues to walk with lack of TKE. Pt remains appropriate for skilled PT to facilitate a return to PLOF.     David Is progressing well towards his goals.   Patient prognosis is Good.     Patient will continue to benefit from skilled outpatient physical therapy to address the deficits listed in the problem list box on initial evaluation, provide pt/family education and to maximize pt's level of independence in the home and community environment.     Patient's spiritual, cultural and educational needs considered and pt agreeable to plan of care and goals.     Anticipated barriers to physical therapy: none     Goals:   Short Term Goals: (4 weeks)  1. Pt will be independent with HEP in order to supplement patient in improving functional mobility. - progressing, not met  2. Pt will improve (R) knee AROM to at least 0-110 in order to improve gait. - progressing, not met  3. Pt will perform TUG in < 12 seconds without AD in order to decrease risk of falling -  progressing, not met  4. Pt will improve ability to do a quad set to good and be able to complete SLR with no lag - progressing, not met     Long Term Goals: (8 weeks)  1. Pt will be independent with updated HEP supplement PT in improving functional mobility. - progressing, not met  2. Pt will improve (R) knee AROM to at least 0-120 in order to improve gait and ability to perform ADLs. - progressing, not met  3. Pt will improve FOTO knee survey score to </= predicted % limited in order to demo improved functional mobility. - progressing, not met  4. Pt will perform at least 10 sit to stands without UE support on 30 second sit to stand test in order to demo improved ability to perform transfers. - progressing, not met    Plan     Continue per Plan of Care     Vitaly Bush, PT

## 2023-12-28 ENCOUNTER — CLINICAL SUPPORT (OUTPATIENT)
Dept: REHABILITATION | Facility: HOSPITAL | Age: 58
End: 2023-12-28
Attending: ORTHOPAEDIC SURGERY
Payer: COMMERCIAL

## 2023-12-28 DIAGNOSIS — M25.661 DECREASED RANGE OF MOTION (ROM) OF RIGHT KNEE: Primary | ICD-10-CM

## 2023-12-28 PROCEDURE — 97530 THERAPEUTIC ACTIVITIES: CPT | Mod: PN

## 2023-12-28 PROCEDURE — 97112 NEUROMUSCULAR REEDUCATION: CPT | Mod: PN

## 2023-12-28 PROCEDURE — 97110 THERAPEUTIC EXERCISES: CPT | Mod: PN

## 2023-12-28 NOTE — PROGRESS NOTES
[facility]   Physical Therapy Treatment Note      Name: David ADLER [facility] Number: [id number]    Therapy Diagnosis:   Encounter Diagnosis   Name Primary?    Decreased range of motion (ROM) of right knee Yes     Physician: Navarro Lockhart II, MD    Visit Date: 12/28/2023    Physician Orders: PT Eval and Treat   Medical Diagnosis from Referral: Z96.651 (ICD-10-CM) - History of total right knee replacement  Evaluation Date: 12/1/2023  Authorization Period Expiration: 12/29/2023  Plan of Care Expiration: 1/26/2024  Visit # / Visits authorized: 5 / 20     Time In: 10:24 am (pt 20 minutes late)  Time Out: 11:02 am  Total Appointment Time (timed & untimed codes): 38 minutes     Precautions: Standard     Date of Procedure: 11/15/2023   Procedure: Procedure(s) (LRB):  ARTHROPLASTY, KNEE, TOTAL (Right)   PTA Visit #: 1/5   Subjective     Patient reports: no issues upon arrival. States his hips were sore after last session.     He was compliant with home exercise program.  Response to previous treatment: no issues   Functional change: less pain     Pain: 0/10  Location: right knee      Objective      ROM: -3 degrees extension; 0 degrees post stretching; 110 degree flexion  Quad set: fair +   SLR: 1-2 degree lag in supine    Treatment     David received the treatments listed below:      David received therapeutic exercises to develop strength, endurance, ROM, and flexibility for 8 minutes including:                            Long sitting Heel prop x 3 minutes 3#   Seated SLR 3# 3 x 10      Not today:   Upright Bike 3 minutes; Level 2     David received the following neuromuscular reeducation: sense/kinesthesia/proprioception for 10 minutes, including:                          TKE BTB 3 x 10; 3s holds     Seated LAQ 25# 3 x 8; 3s concentric/eccentric   Seated Hip Abd 3 x 10 110#    Not today:   NMES: Quad set; 10s on / 10s off - 4 minutes  NMES: Seated Quad set+ SLR; 5s on / 10s off - 4  minutes  NMES: TKE GTB 5s on/ 10s off - 3 minutes    Therapeutic activities to improve functional performance for 20 minutes, including:     SL Sit to Stand on 24inch box+foam pad 3 x 8    Sled Push Fwd / Bwd 3 x 10 yards   Backward Walking 3 x 10 yards     Patient Education and Home Exercises       Education provided:   - home exercises, gait mechanics     Written Home Exercises Provided: Patient instructed to cont prior HEP. Exercises were reviewed and David was able to demonstrate them prior to the end of the session.  Daivd demonstrated good  understanding of the education provided. See Electronic Medical Record under Patient Instructions for exercises provided during therapy sessions    Assessment     David continues to arrive to PT with lack of TKE. He was also late upon arrival. He reports he is stretching his knee but I reminded him to continue stretching every hour for maintenance of extension. Pt verbalized understanding.     David Is progressing well towards his goals.   Patient prognosis is Good.     Patient will continue to benefit from skilled outpatient physical therapy to address the deficits listed in the problem list box on initial evaluation, provide pt/family education and to maximize pt's level of independence in the home and community environment.     Patient's spiritual, cultural and educational needs considered and pt agreeable to plan of care and goals.     Anticipated barriers to physical therapy: none     Goals:   Short Term Goals: (4 weeks)  1. Pt will be independent with HEP in order to supplement patient in improving functional mobility. - progressing, not met  2. Pt will improve (R) knee AROM to at least 0-110 in order to improve gait. - progressing, not met  3. Pt will perform TUG in < 12 seconds without AD in order to decrease risk of falling - progressing, not met  4. Pt will improve ability to do a quad set to good and be able to complete SLR with no lag - progressing, not met      Long Term Goals: (8 weeks)  1. Pt will be independent with updated HEP supplement PT in improving functional mobility. - progressing, not met  2. Pt will improve (R) knee AROM to at least 0-120 in order to improve gait and ability to perform ADLs. - progressing, not met  3. Pt will improve FOTO knee survey score to </= predicted % limited in order to demo improved functional mobility. - progressing, not met  4. Pt will perform at least 10 sit to stands without UE support on 30 second sit to stand test in order to demo improved ability to perform transfers. - progressing, not met    Plan     Continue per Plan of Care     Vitaly Bush, PT

## 2024-01-02 ENCOUNTER — CLINICAL SUPPORT (OUTPATIENT)
Dept: REHABILITATION | Facility: HOSPITAL | Age: 59
End: 2024-01-02
Payer: MEDICAID

## 2024-01-02 DIAGNOSIS — M25.661 DECREASED RANGE OF MOTION (ROM) OF RIGHT KNEE: Primary | ICD-10-CM

## 2024-01-02 DIAGNOSIS — Z96.651 HISTORY OF TOTAL RIGHT KNEE REPLACEMENT: ICD-10-CM

## 2024-01-02 PROCEDURE — 97112 NEUROMUSCULAR REEDUCATION: CPT | Mod: PN,CQ

## 2024-01-02 PROCEDURE — 97110 THERAPEUTIC EXERCISES: CPT | Mod: PN,CQ

## 2024-01-02 PROCEDURE — 97530 THERAPEUTIC ACTIVITIES: CPT | Mod: PN,CQ

## 2024-01-02 NOTE — PROGRESS NOTES
[facility]   Physical Therapy Treatment Note      Name: David ADLER [facility] Number: [id number]    Therapy Diagnosis:   Encounter Diagnoses   Name Primary?    History of total right knee replacement     Decreased range of motion (ROM) of right knee Yes     Physician: Navarro Lockhart II, MD    Visit Date: 1/2/2024    Physician Orders: PT Eval and Treat   Medical Diagnosis from Referral: Z96.651 (ICD-10-CM) - History of total right knee replacement  Evaluation Date: 12/1/2023  Authorization Period Expiration: 12/29/2023  Plan of Care Expiration: 1/26/2024  Visit # / Visits authorized: 6 / 20     Time In: 830  Time Out: 915  Total Appointment Time (timed & untimed codes): 45 minutes     Precautions: Standard     Date of Procedure: 11/15/2023   Procedure: Procedure(s) (LRB):  ARTHROPLASTY, KNEE, TOTAL (Right)     PTA Visit #: 1/5     Subjective     Patient reports: tightness and swelling after just driving for an hour.   He was compliant with home exercise program.  Response to previous treatment: no issues   Functional change: less pain     Pain: 0/10  Location: right knee      Objective      ROM: -3 degrees extension; 0 degrees post stretching; 110 degree flexion  Quad set: fair +   SLR: 1-2 degree lag in supine    Treatment     David received the treatments listed below:      David received therapeutic exercises to develop strength, endurance, ROM, and flexibility for 8 minutes including:                            Long sitting Heel prop x 3 minutes 3#- not performed   Seated SLR 3# 3 x 10 -- not performed    Knee flexion stretch at stairs 2 x 10      David received the following neuromuscular reeducation: sense/kinesthesia/proprioception for 10 minutes, including:                          TKE BTB 3 x 10; 3s holds     Precor single leg LAQ 25# 3 x 8; 3s concentric/eccentric   Seated Hip Abd 3 x 10 110#     Therapeutic activities to improve functional performance for 20 minutes,  including:     SL Sit to Stand on 18nch box 15x   Sled Push Fwd / Bwd 3 x 10 yards   Backward Walking 3 x 10 yards    Shuttle Right Lower Extremity 25# 3 x 10     Patient Education and Home Exercises       Education provided:   - home exercises, gait mechanics     Written Home Exercises Provided: Patient instructed to cont prior HEP. Exercises were reviewed and David was able to demonstrate them prior to the end of the session.  David demonstrated good  understanding of the education provided. See Electronic Medical Record under Patient Instructions for exercises provided during therapy sessions    Assessment     David continues to arrive to PT with lack of TKE. Continues to be challenged with right stability and strength with full active knee extension.     David Is progressing well towards his goals.   Patient prognosis is Good.     Patient will continue to benefit from skilled outpatient physical therapy to address the deficits listed in the problem list box on initial evaluation, provide pt/family education and to maximize pt's level of independence in the home and community environment.     Patient's spiritual, cultural and educational needs considered and pt agreeable to plan of care and goals.     Anticipated barriers to physical therapy: none     Goals:   Short Term Goals: (4 weeks)  1. Pt will be independent with HEP in order to supplement patient in improving functional mobility. - progressing, not met  2. Pt will improve (R) knee AROM to at least 0-110 in order to improve gait. - progressing, not met  3. Pt will perform TUG in < 12 seconds without AD in order to decrease risk of falling - progressing, not met  4. Pt will improve ability to do a quad set to good and be able to complete SLR with no lag - progressing, not met     Long Term Goals: (8 weeks)  1. Pt will be independent with updated HEP supplement PT in improving functional mobility. - progressing, not met  2. Pt will improve (R) knee AROM to at  least 0-120 in order to improve gait and ability to perform ADLs. - progressing, not met  3. Pt will improve FOTO knee survey score to </= predicted % limited in order to demo improved functional mobility. - progressing, not met  4. Pt will perform at least 10 sit to stands without UE support on 30 second sit to stand test in order to demo improved ability to perform transfers. - progressing, not met    Plan     Continue per Plan of Care     Mariella Dunn, PTA

## 2024-01-05 ENCOUNTER — CLINICAL SUPPORT (OUTPATIENT)
Dept: REHABILITATION | Facility: HOSPITAL | Age: 59
End: 2024-01-05
Payer: COMMERCIAL

## 2024-01-05 DIAGNOSIS — M25.661 DECREASED RANGE OF MOTION (ROM) OF RIGHT KNEE: Primary | ICD-10-CM

## 2024-01-05 PROCEDURE — 97530 THERAPEUTIC ACTIVITIES: CPT | Mod: PN,CQ

## 2024-01-05 PROCEDURE — 97112 NEUROMUSCULAR REEDUCATION: CPT | Mod: PN,CQ

## 2024-01-05 PROCEDURE — 97110 THERAPEUTIC EXERCISES: CPT | Mod: PN,CQ

## 2024-01-05 NOTE — PROGRESS NOTES
[facility]   Physical Therapy Treatment Note      Name: David ADLER [facility] Number: [id number]    Therapy Diagnosis:   Encounter Diagnosis   Name Primary?    Decreased range of motion (ROM) of right knee Yes     Physician: Navarro Lockhart II, MD    Visit Date: 1/5/2024    Physician Orders: PT Eval and Treat   Medical Diagnosis from Referral: Z96.651 (ICD-10-CM) - History of total right knee replacement  Evaluation Date: 12/1/2023  Authorization Period Expiration: 12/29/2023  Plan of Care Expiration: 1/26/2024  Visit # / Visits authorized: 7 / 20     Time In: 905  Time Out: 1000  Total Appointment Time (timed & untimed codes): 55 minutes     Precautions: Standard     Date of Procedure: 11/15/2023   Procedure: Procedure(s) (LRB):  ARTHROPLASTY, KNEE, TOTAL (Right)     PTA Visit #: 1/5     Subjective     Patient reports: doing alright upon arrival.   He was compliant with home exercise program.  Response to previous treatment: no issues   Functional change: less pain     Pain: 0/10  Location: right knee      Objective      ROM: -3 degrees extension; 0 degrees post stretching; 110 degree flexion  Quad set: fair +   SLR: 1-2 degree lag in supine    Treatment     David received the treatments listed below:      David received therapeutic exercises to develop strength, endurance, ROM, and flexibility for 8 minutes including:                            Long sitting Heel prop x 3 minutes 3#- not performed   Seated SLR 3# 3 x 10 -- not performed    Knee flexion stretch at stairs 2 x 10      David received the following neuromuscular reeducation: sense/kinesthesia/proprioception for 20 minutes, including:                          TKE BTB 3 x 10; 3s holds     Precor double leg 50# 3 x 10   Precor single leg LAQ 20# 3 x 8; 3s concentric/eccentric   Precor hamstring curl double leg 80# 3 x 10    Precor single leg hamstring curl 40# 2 x 10   Seated Hip Abd 3 x 10 110#     Therapeutic activities to  improve functional performance for 25 minutes, including:     Sit to Stand on 18nch box 15x; 15lb KB    Sled Push Fwd / Bwd 4 x 10 yards; 90#   Backward Walking 3 x 10 yards    Shuttle Double leg 100# 3 x 10    Shuttle Right Lower Extremity 50# 3 x 10       Patient Education and Home Exercises       Education provided:   - home exercises, gait mechanics     Written Home Exercises Provided: Patient instructed to cont prior HEP. Exercises were reviewed and David was able to demonstrate them prior to the end of the session.  David demonstrated good  understanding of the education provided. See Electronic Medical Record under Patient Instructions for exercises provided during therapy sessions    Assessment     David continues to arrive to PT with lack of TKE. Progress strength training today. Continues to be challenged with right stability and strength with full active knee extension.     David Is progressing well towards his goals.   Patient prognosis is Good.     Patient will continue to benefit from skilled outpatient physical therapy to address the deficits listed in the problem list box on initial evaluation, provide pt/family education and to maximize pt's level of independence in the home and community environment.     Patient's spiritual, cultural and educational needs considered and pt agreeable to plan of care and goals.     Anticipated barriers to physical therapy: none     Goals:   Short Term Goals: (4 weeks)  1. Pt will be independent with HEP in order to supplement patient in improving functional mobility. - progressing, not met  2. Pt will improve (R) knee AROM to at least 0-110 in order to improve gait. - progressing, not met  3. Pt will perform TUG in < 12 seconds without AD in order to decrease risk of falling - progressing, not met  4. Pt will improve ability to do a quad set to good and be able to complete SLR with no lag - progressing, not met     Long Term Goals: (8 weeks)  1. Pt will be  independent with updated HEP supplement PT in improving functional mobility. - progressing, not met  2. Pt will improve (R) knee AROM to at least 0-120 in order to improve gait and ability to perform ADLs. - progressing, not met  3. Pt will improve FOTO knee survey score to </= predicted % limited in order to demo improved functional mobility. - progressing, not met  4. Pt will perform at least 10 sit to stands without UE support on 30 second sit to stand test in order to demo improved ability to perform transfers. - progressing, not met    Plan     Continue per Plan of Care     Mariella Dunn, PTA

## 2024-01-09 ENCOUNTER — CLINICAL SUPPORT (OUTPATIENT)
Dept: REHABILITATION | Facility: HOSPITAL | Age: 59
End: 2024-01-09
Payer: MEDICAID

## 2024-01-09 DIAGNOSIS — M25.661 DECREASED RANGE OF MOTION (ROM) OF RIGHT KNEE: Primary | ICD-10-CM

## 2024-01-09 PROCEDURE — 97112 NEUROMUSCULAR REEDUCATION: CPT | Mod: PN

## 2024-01-09 PROCEDURE — 97110 THERAPEUTIC EXERCISES: CPT | Mod: PN

## 2024-01-09 PROCEDURE — 97530 THERAPEUTIC ACTIVITIES: CPT | Mod: PN

## 2024-01-09 NOTE — PROGRESS NOTES
[facility]   Physical Therapy Treatment Note      Name: David ADLER [facility] Number: [id number]    Therapy Diagnosis:   Encounter Diagnosis   Name Primary?    Decreased range of motion (ROM) of right knee Yes       Physician: Navarro Lockhart II, MD    Visit Date: 1/9/2024    Physician Orders: PT Eval and Treat   Medical Diagnosis from Referral: Z96.651 (ICD-10-CM) - History of total right knee replacement  Evaluation Date: 12/1/2023  Authorization Period Expiration: 12/29/2023  Plan of Care Expiration: 1/26/2024  Visit # / Visits authorized: 8 / 20     Time In: 900 am  Time Out: 956 am  Total Appointment Time (timed & untimed codes): 56 minutes     Precautions: Standard     Date of Procedure: 11/15/2023   Procedure: Procedure(s) (LRB):  ARTHROPLASTY, KNEE, TOTAL (Right)     PTA Visit #: 1/5     Subjective     Patient reports: he is feeling better this morning. States he feels more supported when walking.     He was compliant with home exercise program.  Response to previous treatment: no issues   Functional change: less pain     Pain: 0/10  Location: right knee      Objective      ROM: -5 degrees extension; 0 degrees post stretching; 110 degree flexion  Quad set: fair +   SLR: 1-2 degree lag in supine    Treatment     David received the treatments listed below:      David received therapeutic exercises to develop strength, endurance, ROM, and flexibility for 10 minutes including:                            Long sitting Heel prop x 5 minutes 4#   Long sitting HS stretch with strap 3 x 30s    Knee flexion stretch at stairs 2 x 10; 5s holds     David received the following neuromuscular reeducation: sense/kinesthesia/proprioception for 23 minutes, including:                 NMES Standing Quad set + SLR - 5s on / 10s off - 5 minutes             NMES TKE BTB; 5s on / 10s off - 5 minutes     NMES Precor single leg LAQ 25# 3s on/8s off - 5 minutes   Seated Hip Abd 3 x 10 150#; 3s eccentric      Therapeutic activities to improve functional performance for 23 minutes, including:     SL Sit to Stand from 24 inch box + foam 4 x 8; 1UE support on step stool    Sled Push Fwd / Bwd 4 x 10 yards; 90#   Wall Squats with 3s holds; holding 20# KB 3 x 8       Patient Education and Home Exercises       Education provided:   - home exercises, gait mechanics     Written Home Exercises Provided: Patient instructed to cont prior HEP. Exercises were reviewed and David was able to demonstrate them prior to the end of the session.  David demonstrated good  understanding of the education provided. See Electronic Medical Record under Patient Instructions for exercises provided during therapy sessions    Assessment     David continues to arrive to PT with lack of TKE. He is able to return to TKE with stretching in clinic. Encouraged him to continue to stretch hourly to focus on TKE with gait. He continues to require tactile and verbal  cueing for knee control with CKC exercises. Will continue to progress as tolerated.     Davdi Is progressing well towards his goals.   Patient prognosis is Good.     Patient will continue to benefit from skilled outpatient physical therapy to address the deficits listed in the problem list box on initial evaluation, provide pt/family education and to maximize pt's level of independence in the home and community environment.     Patient's spiritual, cultural and educational needs considered and pt agreeable to plan of care and goals.     Anticipated barriers to physical therapy: none     Goals:   Short Term Goals: (4 weeks)  1. Pt will be independent with HEP in order to supplement patient in improving functional mobility. - progressing, not met  2. Pt will improve (R) knee AROM to at least 0-110 in order to improve gait. - progressing, not met  3. Pt will perform TUG in < 12 seconds without AD in order to decrease risk of falling - progressing, not met  4. Pt will improve ability to do a quad  set to good and be able to complete SLR with no lag - progressing, not met     Long Term Goals: (8 weeks)  1. Pt will be independent with updated HEP supplement PT in improving functional mobility. - progressing, not met  2. Pt will improve (R) knee AROM to at least 0-120 in order to improve gait and ability to perform ADLs. - progressing, not met  3. Pt will improve FOTO knee survey score to </= predicted % limited in order to demo improved functional mobility. - progressing, not met  4. Pt will perform at least 10 sit to stands without UE support on 30 second sit to stand test in order to demo improved ability to perform transfers. - progressing, not met    Plan     Continue per Plan of Care     Vitaly Bush, PT

## 2024-01-17 NOTE — PROGRESS NOTES
[facility]   Physical Therapy Treatment Note      [id number]    Therapy Diagnosis:   No diagnosis found.      Physician: Navarro Lockhart II, MD    Visit Date: 1/18/2024    Physician Orders: PT Eval and Treat   Medical Diagnosis from Referral: Z96.651 (ICD-10-CM) - History of total right knee replacement  Evaluation Date: 12/1/2023  Authorization Period Expiration: 12/29/2023  Plan of Care Expiration: 1/26/2024  Visit # / Visits authorized: 9 / 20     Time In: *** am  Time Out: *** am  Total Appointment Time (timed & untimed codes): *** minutes     Precautions: Standard     Date of Procedure: 11/15/2023   Procedure: Procedure(s) (LRB):  ARTHROPLASTY, KNEE, TOTAL (Right)     PTA Visit #: 1/5     Subjective     Patient reports: he is feeling better this morning. States he feels more supported when walking.     He was compliant with home exercise program.  Response to previous treatment: no issues   Functional change: less pain     Pain: 0/10  Location: right knee      Objective      ROM: -5 degrees extension; 0 degrees post stretching; 110 degree flexion  Quad set: fair +   SLR: 1-2 degree lag in supine    Treatment     David received the treatments listed below:      David received therapeutic exercises to develop strength, endurance, ROM, and flexibility for 10 minutes including:                            Long sitting Heel prop x 5 minutes 4#   Long sitting HS stretch with strap 3 x 30s    Knee flexion stretch at stairs 2 x 10; 5s holds     David received the following neuromuscular reeducation: sense/kinesthesia/proprioception for 23 minutes, including:                 NMES Standing Quad set + SLR - 5s on / 10s off - 5 minutes             NMES TKE BTB; 5s on / 10s off - 5 minutes     NMES Precor single leg LAQ 25# 3s on/8s off - 5 minutes   Seated Hip Abd 3 x 10 150#; 3s eccentric     Therapeutic activities to improve functional performance for 23  minutes, including:     SL Sit to Stand from 24 inch box + foam 4 x 8; 1UE support on step stool    Sled Push Fwd / Bwd 4 x 10 yards; 90#   Wall Squats with 3s holds; holding 20# KB 3 x 8       Patient Education and Home Exercises       Education provided:   - home exercises, gait mechanics     Written Home Exercises Provided: Patient instructed to cont prior HEP. Exercises were reviewed and David was able to demonstrate them prior to the end of the session.  David demonstrated good  understanding of the education provided. See Electronic Medical Record under Patient Instructions for exercises provided during therapy sessions    Assessment     David continues to arrive to PT with lack of TKE. He is able to return to TKE with stretching in clinic. Encouraged him to continue to stretch hourly to focus on TKE with gait. He continues to require tactile and verbal  cueing for knee control with CKC exercises. Will continue to progress as tolerated.     David Is progressing well towards his goals.   Patient prognosis is Good.     Patient will continue to benefit from skilled outpatient physical therapy to address the deficits listed in the problem list box on initial evaluation, provide pt/family education and to maximize pt's level of independence in the home and community environment.     Patient's spiritual, cultural and educational needs considered and pt agreeable to plan of care and goals.     Anticipated barriers to physical therapy: none     Goals:   Short Term Goals: (4 weeks)  1. Pt will be independent with HEP in order to supplement patient in improving functional mobility. - progressing, not met  2. Pt will improve (R) knee AROM to at least 0-110 in order to improve gait. - progressing, not met  3. Pt will perform TUG in < 12 seconds without AD in order to decrease risk of falling - progressing, not met  4. Pt will improve ability to do a quad set to good and be able to complete SLR with no lag - progressing,  not met     Long Term Goals: (8 weeks)  1. Pt will be independent with updated HEP supplement PT in improving functional mobility. - progressing, not met  2. Pt will improve (R) knee AROM to at least 0-120 in order to improve gait and ability to perform ADLs. - progressing, not met  3. Pt will improve FOTO knee survey score to </= predicted % limited in order to demo improved functional mobility. - progressing, not met  4. Pt will perform at least 10 sit to stands without UE support on 30 second sit to stand test in order to demo improved ability to perform transfers. - progressing, not met    Plan     Continue per Plan of Care     Vitaly Bush, PT

## 2024-01-18 ENCOUNTER — CLINICAL SUPPORT (OUTPATIENT)
Dept: REHABILITATION | Facility: HOSPITAL | Age: 59
End: 2024-01-18
Payer: COMMERCIAL

## 2024-01-18 DIAGNOSIS — M25.661 DECREASED RANGE OF MOTION (ROM) OF RIGHT KNEE: Primary | ICD-10-CM

## 2024-01-18 PROCEDURE — 97530 THERAPEUTIC ACTIVITIES: CPT | Mod: PN,CQ

## 2024-01-18 PROCEDURE — 97112 NEUROMUSCULAR REEDUCATION: CPT | Mod: PN,CQ

## 2024-01-18 PROCEDURE — 97110 THERAPEUTIC EXERCISES: CPT | Mod: PN,CQ

## 2024-01-18 NOTE — PROGRESS NOTES
[facility]   Physical Therapy Treatment Note      Name: David ADLER [facility] Number: [id number]    Therapy Diagnosis:   Encounter Diagnosis   Name Primary?    Decreased range of motion (ROM) of right knee Yes       Physician: Navarro Lockhart II, MD    Visit Date: 1/18/2024    Physician Orders: PT Eval and Treat   Medical Diagnosis from Referral: Z96.651 (ICD-10-CM) - History of total right knee replacement  Evaluation Date: 12/1/2023  Authorization Period Expiration: 12/29/2023  Plan of Care Expiration: 1/26/2024  Visit # / Visits authorized: 9 / 20     Time In: 905 am  Time Out: 955 am  Total Appointment Time (timed & untimed codes): 55 minutes     Precautions: Standard     Date of Procedure: 11/15/2023   Procedure: Procedure(s) (LRB):  ARTHROPLASTY, KNEE, TOTAL (Right)     PTA Visit #: 1/5     Subjective     Patient reports: knee is still not 100%, but getting there.   He was compliant with home exercise program.  Response to previous treatment: no issues   Functional change: less pain, walking a little better     Pain: 0/10  Location: right knee      Objective      ROM: -5 degrees extension; 0 degrees post stretching; 110 degree flexion  Quad set: fair +   SLR: 1-2 degree lag in supine    Treatment     David received the treatments listed below:      David received therapeutic exercises to develop strength, endurance, ROM, and flexibility for 18 minutes including:                 Upright bike x 5 minutes, level 6, seat 9               Long sitting Heel prop x 5 minutes 4#   Long sitting HS stretch with strap 3 x 30s    Knee flexion stretch at stairs 2 x 10; 5s holds     David received the following neuromuscular reeducation: sense/kinesthesia/proprioception for 23 minutes, including:                 Quad set + SLR - 3 x 10             TKE BTB; 3 x 10     Precor single leg Long Arc Quad  25# 3 x 10    Precor Seated Hip Abd 3 x 10 150#; 3s eccentric     Therapeutic activities to  improve functional performance for 23 minutes, including:     SL Sit to Stand from 24 inch box + foam 4 x 8; 1UE support on step stool    Sled Push Fwd / Bwd 4 x 10 yards; 90#   Wall Squats with 3s holds; holding 20# KB 3 x 8       Patient Education and Home Exercises       Education provided:   - home exercises, gait mechanics     Written Home Exercises Provided: Patient instructed to cont prior HEP. Exercises were reviewed and David was able to demonstrate them prior to the end of the session.  David demonstrated good  understanding of the education provided. See Electronic Medical Record under Patient Instructions for exercises provided during therapy sessions    Assessment     David tolerated session well with minor lacking of active full knee extension, but tolerated session well and challenged appropriately. Continue to progress quad strength and stability.     David Is progressing well towards his goals.   Patient prognosis is Good.     Patient will continue to benefit from skilled outpatient physical therapy to address the deficits listed in the problem list box on initial evaluation, provide pt/family education and to maximize pt's level of independence in the home and community environment.     Patient's spiritual, cultural and educational needs considered and pt agreeable to plan of care and goals.     Anticipated barriers to physical therapy: none     Goals:   Short Term Goals: (4 weeks)  1. Pt will be independent with HEP in order to supplement patient in improving functional mobility. - progressing, not met  2. Pt will improve (R) knee AROM to at least 0-110 in order to improve gait. - progressing, not met  3. Pt will perform TUG in < 12 seconds without AD in order to decrease risk of falling - progressing, not met  4. Pt will improve ability to do a quad set to good and be able to complete SLR with no lag - progressing, not met     Long Term Goals: (8 weeks)  1. Pt will be independent with updated HEP  supplement PT in improving functional mobility. - progressing, not met  2. Pt will improve (R) knee AROM to at least 0-120 in order to improve gait and ability to perform ADLs. - progressing, not met  3. Pt will improve FOTO knee survey score to </= predicted % limited in order to demo improved functional mobility. - progressing, not met  4. Pt will perform at least 10 sit to stands without UE support on 30 second sit to stand test in order to demo improved ability to perform transfers. - progressing, not met    Plan     Continue per Plan of Care     Mariella Dunn, PTA

## 2024-01-25 ENCOUNTER — OFFICE VISIT (OUTPATIENT)
Dept: ORTHOPEDICS | Facility: CLINIC | Age: 59
End: 2024-01-25
Payer: COMMERCIAL

## 2024-01-25 VITALS — BODY MASS INDEX: 33.45 KG/M2 | WEIGHT: 246.94 LBS | HEIGHT: 72 IN

## 2024-01-25 DIAGNOSIS — Z96.651 HISTORY OF TOTAL RIGHT KNEE REPLACEMENT: Primary | ICD-10-CM

## 2024-01-25 DIAGNOSIS — M17.11 UNILATERAL PRIMARY OSTEOARTHRITIS, RIGHT KNEE: ICD-10-CM

## 2024-01-25 PROCEDURE — 1159F MED LIST DOCD IN RCRD: CPT | Mod: CPTII,S$GLB,, | Performed by: ORTHOPAEDIC SURGERY

## 2024-01-25 PROCEDURE — 1160F RVW MEDS BY RX/DR IN RCRD: CPT | Mod: CPTII,S$GLB,, | Performed by: ORTHOPAEDIC SURGERY

## 2024-01-25 PROCEDURE — 99999 PR PBB SHADOW E&M-EST. PATIENT-LVL III: CPT | Mod: PBBFAC,,, | Performed by: ORTHOPAEDIC SURGERY

## 2024-01-25 PROCEDURE — 99024 POSTOP FOLLOW-UP VISIT: CPT | Mod: S$GLB,,, | Performed by: ORTHOPAEDIC SURGERY

## 2024-01-25 RX ORDER — HYDROCODONE BITARTRATE AND ACETAMINOPHEN 7.5; 325 MG/1; MG/1
1 TABLET ORAL EVERY 6 HOURS PRN
Qty: 28 TABLET | Refills: 0 | Status: SHIPPED | OUTPATIENT
Start: 2024-01-25

## 2024-01-25 NOTE — PROGRESS NOTES
CC:  A 58-year-old male follows up status post right total knee arthroplasty.  Date of surgery was 11/15/2023.  He is 10 weeks out.  He is complaining today of some pain in the right knee.  He has not taking anything for pain.  He reports he has been in pain since this past Sunday.  In talking together he does report that he hyperflexed the knee causes foot got caught on something on the floor and he has had the increased pain since then.  He has not been to physical therapy in a few days.  He rates his pain as a 7/10.    RLE:  Incision is well healed with no sign of infection  Swelling consistent with the patient's history of knee replacement surgery 10 weeks ago  Grossly intact motor sensory function distally  Knee is stable on exam  Range of motion 0-120    X-ray images were examined and personally interpreted by me.  Previous x-rays dated 12/26/2023 were once again reviewed.  There is a well-fixed right total knee arthroplasty that is in good alignment.    Dx:  Increased pain after tripping and hyperflexing the knee    Plan:  I am going to refill the patient's pain medication and we discussed the importance of him getting back into physical therapy.  We discussed elevating and icing the knee.  We discussed the fact that it may take 6 months to a year for the swelling to go down.  Follow up in 4 weeks for re-evaluation.

## 2024-02-08 DIAGNOSIS — Z96.651 HISTORY OF TOTAL RIGHT KNEE REPLACEMENT: Primary | ICD-10-CM

## 2024-02-22 ENCOUNTER — HOSPITAL ENCOUNTER (OUTPATIENT)
Dept: RADIOLOGY | Facility: HOSPITAL | Age: 59
Discharge: HOME | End: 2024-02-22
Attending: ORTHOPAEDIC SURGERY
Payer: MEDICAID

## 2024-02-22 ENCOUNTER — OFFICE VISIT (OUTPATIENT)
Dept: ORTHOPEDICS | Facility: CLINIC | Age: 59
End: 2024-02-22
Payer: MEDICAID

## 2024-02-22 DIAGNOSIS — Z96.651 HISTORY OF TOTAL RIGHT KNEE REPLACEMENT: Primary | ICD-10-CM

## 2024-02-22 DIAGNOSIS — Z96.651 HISTORY OF TOTAL RIGHT KNEE REPLACEMENT: ICD-10-CM

## 2024-02-22 PROCEDURE — 73562 X-RAY EXAM OF KNEE 3: CPT | Mod: 26,59,LT, | Performed by: RADIOLOGY

## 2024-02-22 PROCEDURE — 99213 OFFICE O/P EST LOW 20 MIN: CPT | Mod: S$PBB,,, | Performed by: ORTHOPAEDIC SURGERY

## 2024-02-22 PROCEDURE — 99213 OFFICE O/P EST LOW 20 MIN: CPT | Mod: PBBFAC,25,PO | Performed by: ORTHOPAEDIC SURGERY

## 2024-02-22 PROCEDURE — 99999 PR PBB SHADOW E&M-EST. PATIENT-LVL III: CPT | Mod: PBBFAC,,, | Performed by: ORTHOPAEDIC SURGERY

## 2024-02-22 PROCEDURE — 73562 X-RAY EXAM OF KNEE 3: CPT | Mod: TC,PO,LT

## 2024-02-22 PROCEDURE — 73564 X-RAY EXAM KNEE 4 OR MORE: CPT | Mod: 26,RT,, | Performed by: RADIOLOGY

## 2024-02-22 RX ORDER — CYCLOBENZAPRINE HCL 10 MG
10 TABLET ORAL 3 TIMES DAILY PRN
Qty: 90 TABLET | Refills: 3 | Status: SHIPPED | OUTPATIENT
Start: 2024-02-22 | End: 2024-04-08 | Stop reason: SDUPTHER

## 2024-02-22 NOTE — PROGRESS NOTES
CC:  58-year-old male follows up status post right total knee arthroplasty.  Date of surgery was 11/15/2023.  Overall the patient is doing well.  He does report some muscle spasms at night.  He did not finish out physical therapy due to insurance reasons.  Despite that overall he is doing well.  He is walking and exercising on his own.    Past Medical History:   Diagnosis Date    ADD (attention deficit disorder with hyperactivity) 04/15/2013    GERD (gastroesophageal reflux disease)     Hyperlipidemia     Hypertension     Hypogonadism in male     Insomnia     Secondary polycythemia        Past Surgical History:   Procedure Laterality Date    ARTHROSCOPIC CHONDROPLASTY OF KNEE JOINT Right 10/10/2022    Procedure: ARTHROSCOPY, KNEE, WITH CHONDROPLASTY;  Surgeon: Herbie Hoyt MD;  Location: [facility] OR;  Service: Orthopedics;  Laterality: Right;    COLONOSCOPY N/A 11/06/2018    Dr. Chadwick; unremarkable; repeat in 10 years    EXCISION OF MEDIAL MENISCUS OF KNEE Right 10/10/2022    Procedure: MENISCECTOMY, KNEE, MEDIAL;  Surgeon: Herbie Hoyt MD;  Location: [facility] OR;  Service: Orthopedics;  Laterality: Right;    HERNIA REPAIR  2000    L knee scope      L shoulder surgery      TOTAL KNEE ARTHROPLASTY Right 11/15/2023    Procedure: ARTHROPLASTY, KNEE, TOTAL;  Surgeon: Navarro Lockhart II, MD;  Location: [facility] OR;  Service: Orthopedics;  Laterality: Right;  Ronny Self    VASECTOMY         Current Outpatient Medications on File Prior to Visit   Medication Sig Dispense Refill    AMBIEN 10 mg Tab Take 10 mg by mouth every evening.      dextroamphetamine-amphetamine (AMPHETAMINE SALT COMBO) 30 mg Tab Take 1 tablet by mouth 3 (three) times daily.       EScitalopram oxalate (LEXAPRO) 20 MG tablet Take 20 mg by mouth.      HYDROcodone-acetaminophen (NORCO) 7.5-325 mg per tablet Take 1 tablet by mouth every 6 (six) hours as needed for Pain. 28 tablet 0    ibuprofen (ADVIL,MOTRIN) 800 MG tablet Take 1 tablet (800 mg  total) by mouth 3 (three) times daily. 90 tablet 2    ondansetron (ZOFRAN-ODT) 8 MG TbDL Take 1 tablet (8 mg total) by mouth 2 (two) times daily as needed. 30 tablet 0    testosterone cypionate (DEPOTESTOTERONE CYPIONATE) 200 mg/mL injection INJECT 1 ML (200MG) INTRAMUSCULARLY EVERY 14 DAYS 2 mL 0    aspirin (ECOTRIN) 81 MG EC tablet Take 1 tablet (81 mg total) by mouth 2 (two) times a day. for 14 days 28 tablet 0     No current facility-administered medications on file prior to visit.       ROS:    Constitution: Denies chills, fever, and sweats.  HENT: Denies headaches or blurry vision.  Cardiovascular: Denies chest pain or irregular heart beat.  Respiratory: Denies cough or shortness of breath.  Gastrointestinal: Denies abdominal pain, nausea, or vomiting.  Genitourinary:  Denies urinary incontinence, bladder and kidney issues  Musculoskeletal:  Denies muscle cramps.  Neurological: Denies dizziness or focal weakness.  Psychiatric/Behavioral: Normal mental status.  Hematologic/Lymphatic: Denies bleeding problem or easy bruising/bleeding.  Skin: Denies rash or suspicious lesions.    Physical examination     Gen - No acute distress, well nourished, well groomed   Eyes - Extraoccular motions intact, pupils equally round and reactive to light and accommodation   ENT - normocephalic, atruamtic, oropharynx clear   Neck - Supple, no abnormal masses   Cardiovascular - regular rate and rhythm   Pulmonary - clear to auscultation bilaterally, no wheezes, ronchi, or rales   Abdomen - soft, non-tender, non-distended, positive bowel sounds   Psych - The patient is alert and oriented x3 with normal mood and affect    Examination of the Right Lower Extremity:     Skin intact throughout.  Incision is well healed with no sign of infection  Motor function is intact distally EHL/FHL/TA/casper   +2 dorsalis pedis and posterior tibial pulses   Sensation to light touch intact distally dorsal, plantar, and first web space     Examination of  the Right knee:    ROM 0 - 130   Effusion negative  Tenderness to palpation at the joint line negative  Pain during range of motion negative  Crepitation during range of motion negative     negative increased pain noted with flexion past 90   negative antalgic gait noted   negative Varus/Valgus instability    X-ray images were examined and personally interpreted by me.  Three views the right knee dated 02/22/2024 show a right total knee arthroplasty that is well fixed and in good alignment.    Dx:  Status post right total knee arthroplasty, stable    Plan:  I am going to send the patient a prescription for the muscle spasms.  We discussed continuing to exercise on a home program.  Follow up in 3 months for re-evaluation.

## 2024-04-08 DIAGNOSIS — Z96.651 HISTORY OF TOTAL RIGHT KNEE REPLACEMENT: ICD-10-CM

## 2024-04-09 RX ORDER — CYCLOBENZAPRINE HCL 10 MG
10 TABLET ORAL 3 TIMES DAILY PRN
Qty: 90 TABLET | Refills: 3 | Status: SHIPPED | OUTPATIENT
Start: 2024-04-09 | End: 2024-07-08

## 2024-04-15 ENCOUNTER — TELEPHONE (OUTPATIENT)
Dept: FAMILY MEDICINE | Facility: CLINIC | Age: 59
End: 2024-04-15
Payer: MEDICAID

## 2024-04-15 NOTE — TELEPHONE ENCOUNTER
----- Message from Agustina Medellin sent at 4/15/2024 10:14 AM CDT -----  Regarding: refill /  Contact: [phone]  Type: Needs Medical Advice    Who Called:  David    Pharmacy name and phone #:    [id number] - [place] - [address] AT [facility] [address] [facility] [facility] [address]  [address] [place] [address]  Phone: [phone] Fax: [phone]      Best Call Back Number: [phone]    Additional Information: patient needs heart medication refill but does not know the name of Rx. Please call to advise, he is leaving country soon, Thank you!

## 2024-04-15 NOTE — TELEPHONE ENCOUNTER
Rt pt call to find out what medication pt is requesting to be refilled don't see a heart medication pt is talking about in message. No answer left vm for pt to call us back with clarification.- [facility]   None felt

## 2024-05-22 DIAGNOSIS — Z96.651 HISTORY OF TOTAL RIGHT KNEE REPLACEMENT: Primary | ICD-10-CM

## 2024-06-11 RX ORDER — TELMISARTAN AND HYDROCHLORTHIAZIDE 40; 12.5 MG/1; MG/1
1 TABLET ORAL DAILY
Qty: 30 TABLET | Refills: 3 | Status: CANCELLED | OUTPATIENT
Start: 2024-06-11

## 2024-06-11 RX ORDER — TELMISARTAN AND HYDROCHLORTHIAZIDE 40; 12.5 MG/1; MG/1
1 TABLET ORAL DAILY
COMMUNITY

## 2024-06-11 NOTE — TELEPHONE ENCOUNTER
No care due was identified.  [facility] Embedded Care Due Messages. Reference number: [id number].   6/11/2024 3:04:22 PM CDT

## 2024-06-11 NOTE — TELEPHONE ENCOUNTER
Called pt to advise, for some reason requested medication was not on his chart. I added medication back to chart and sent refill request

## 2024-06-11 NOTE — TELEPHONE ENCOUNTER
----- Message from Oscar Zhu, Patient Care Assistant sent at 6/11/2024  2:18 PM CDT -----  Contact: Pt  Type:  RX Refill Request    Who Called:  Pt  Refill or New Rx:  Refill  RX Name and Strength:  Talmasartan 40-12.5 mg  How is the patient currently taking it? (ex. 1XDay):  As Directed  Is this a 30 day or 90 day RX:  90  Preferred Pharmacy with phone number:    [id number] - [place] - [address]   [address]  [facility] [address]  Phone: [phone] Fax: [phone]  Local or Mail Order:  local  Ordering Provider:  Bong Del Angel Call Back Number:  [phone]  Additional Information:  Please contact pt upon completion-Thank you~    Pt waiting at pharm

## 2024-07-23 ENCOUNTER — OFFICE VISIT (OUTPATIENT)
Dept: FAMILY MEDICINE | Facility: CLINIC | Age: 59
End: 2024-07-23
Payer: MEDICAID

## 2024-07-23 VITALS
DIASTOLIC BLOOD PRESSURE: 98 MMHG | HEART RATE: 80 BPM | WEIGHT: 277.69 LBS | OXYGEN SATURATION: 96 % | HEIGHT: 72 IN | BODY MASS INDEX: 37.61 KG/M2 | SYSTOLIC BLOOD PRESSURE: 146 MMHG

## 2024-07-23 DIAGNOSIS — Z13.1 SCREENING FOR DIABETES MELLITUS: ICD-10-CM

## 2024-07-23 DIAGNOSIS — H93.13 TINNITUS OF BOTH EARS: ICD-10-CM

## 2024-07-23 DIAGNOSIS — I10 HYPERTENSION, UNSPECIFIED TYPE: Primary | ICD-10-CM

## 2024-07-23 DIAGNOSIS — Z00.00 PREVENTATIVE HEALTH CARE: ICD-10-CM

## 2024-07-23 DIAGNOSIS — Z12.5 PROSTATE CANCER SCREENING: ICD-10-CM

## 2024-07-23 PROCEDURE — 1159F MED LIST DOCD IN RCRD: CPT | Mod: CPTII,,, | Performed by: NURSE PRACTITIONER

## 2024-07-23 PROCEDURE — 99999 PR PBB SHADOW E&M-EST. PATIENT-LVL V: CPT | Mod: PBBFAC,,, | Performed by: NURSE PRACTITIONER

## 2024-07-23 PROCEDURE — 3008F BODY MASS INDEX DOCD: CPT | Mod: CPTII,,, | Performed by: NURSE PRACTITIONER

## 2024-07-23 PROCEDURE — 3077F SYST BP >= 140 MM HG: CPT | Mod: CPTII,,, | Performed by: NURSE PRACTITIONER

## 2024-07-23 PROCEDURE — 1160F RVW MEDS BY RX/DR IN RCRD: CPT | Mod: CPTII,,, | Performed by: NURSE PRACTITIONER

## 2024-07-23 PROCEDURE — 99214 OFFICE O/P EST MOD 30 MIN: CPT | Mod: S$PBB,,, | Performed by: NURSE PRACTITIONER

## 2024-07-23 PROCEDURE — 99215 OFFICE O/P EST HI 40 MIN: CPT | Mod: PBBFAC,PN | Performed by: NURSE PRACTITIONER

## 2024-07-23 PROCEDURE — 3079F DIAST BP 80-89 MM HG: CPT | Mod: CPTII,,, | Performed by: NURSE PRACTITIONER

## 2024-07-23 RX ORDER — TELMISARTAN AND HYDROCHLORTHIAZIDE 40; 12.5 MG/1; MG/1
1 TABLET ORAL DAILY
Qty: 90 TABLET | Refills: 0 | Status: SHIPPED | OUTPATIENT
Start: 2024-07-23

## 2024-07-23 NOTE — PROGRESS NOTES
THIS DOCUMENT WAS MADE IN PART WITH VOICE RECOGNITION SOFTWARE.  OCCASIONALLY THIS SOFTWARE WILL MISINTERPRET WORDS OR PHRASES.     Assessment and Plan:    Hypertension, unspecified type  Comments:  uncontrolled   telmisartan HCTZ resumed   advised to take medication as prescribed.   monitor blood pressure  Orders:  -     Comprehensive Metabolic Panel; Future; Expected date: 07/23/2024  -     CBC Without Differential; Future; Expected date: 07/23/2024  -     Lipid Panel; Future; Expected date: 07/23/2024  -     telmisartan-hydrochlorothiazide (MICARDIS HCT) 40-12.5 mg per tablet; Take 1 tablet by mouth once daily.  Dispense: 90 tablet; Refill: 0    Tinnitus of both ears  -     Ambulatory referral/consult to ENT; Future; Expected date: 07/30/2024    Preventative health care  -     Comprehensive Metabolic Panel; Future; Expected date: 07/23/2024  -     CBC Without Differential; Future; Expected date: 07/23/2024  -     Lipid Panel; Future; Expected date: 07/23/2024  -     TSH; Future; Expected date: 07/23/2024  -     Hemoglobin A1C; Future; Expected date: 07/23/2024    Prostate cancer screening  -     PSA, Screening; Future; Expected date: 07/23/2024    Screening for diabetes mellitus  -     Hemoglobin A1C; Future; Expected date: 07/23/2024             Visit summary:     Patient was seen today for hypertension.     Blood pressure is not controlled today.     Telmisartan hydrochlorothiazide 40- 12.5 mg resumed     Patient advised to resume taking medication and  monitor blood pressure daily/ keep log     We will review blood pressure log at  annual.     Patient advised to have labs completed prior to annual with PCP-  Dr. Woody.       Continue to work on regular exercise, maintain healthy weight, balanced diet. Avoid unhealthy habits: smoking, excessive alcohol intake.      Advised on diagnosis, medications and symptomatic treatment.    Emergent conditions/ER precautions discussed.    Referral placed to ENT for further  evaluation of tinnitus.       Follow up in about 4 weeks (around 8/20/2024) for Annual with PCP- Annual.   ______________________________________________________________________  Subjective:    Chief Complaint:   Hypertension     HPI:  David is a 58 y.o. year old male with a past medical history noted below, here to follow up  for hypertension.   He reports that he has been out of his b/p medications since March.    Patient prescribed Telmisartan -hydrochlorothiazide 40-12.5 mg  daily.  He reports blood pressure was controlled when he was taking medicine. Patient reports change in insurance.  Patient reports that he has a blood pressure cuff,  has not been monitoring blood pressure  consistently. States he will check blood pressure when  his wife notices that his ears are red.   Patient denies shortness  of breath  and chest pain.      He  reports that he is also concerned regarding ringing in both ears-  states this has been occurring  for a couple years-  progressively getting worse.  He  has not seen ENT or had any type of hearing screen.  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       EScitalopram oxalate (LEXAPRO) 20 MG tablet Take 20 mg by mouth.      testosterone cypionate (DEPOTESTOTERONE CYPIONATE) 200 mg/mL injection INJECT 1 ML (200MG) INTRAMUSCULARLY EVERY 14 DAYS 2 mL 0    [DISCONTINUED] telmisartan-hydrochlorothiazide (MICARDIS HCT) 40-12.5 mg per tablet Take 1 tablet by mouth once daily.      AMBIEN 10 mg Tab Take 10 mg by mouth every evening. (Patient not taking: Reported on 7/23/2024)      aspirin (ECOTRIN) 81 MG EC tablet Take 1 tablet (81 mg total) by mouth 2 (two) times a day. for 14 days (Patient not taking: Reported on 7/23/2024) 28 tablet 0     HYDROcodone-acetaminophen (NORCO) 7.5-325 mg per tablet Take 1 tablet by mouth every 6 (six) hours as needed for Pain. (Patient not taking: Reported on 7/23/2024) 28 tablet 0    ondansetron (ZOFRAN-ODT) 8 MG TbDL Take 1 tablet (8 mg total) by mouth 2 (two) times daily as needed. (Patient not taking: Reported on 7/23/2024) 30 tablet 0     No current facility-administered medications on file prior to visit.       Review of Systems:  Review of Systems   Constitutional:  Negative for chills, fatigue and fever.   HENT:  Positive for tinnitus. Negative for congestion and rhinorrhea.    Respiratory:  Negative for cough and shortness of breath.    Cardiovascular:  Negative for chest pain, palpitations and leg swelling.   Gastrointestinal:  Negative for diarrhea, nausea and vomiting.   Skin:  Negative for rash.   Neurological:  Negative for dizziness, weakness, light-headedness and headaches.       Past Medical History:  Past Medical History:   Diagnosis Date    ADD (attention deficit disorder with hyperactivity) 04/15/2013    GERD (gastroesophageal reflux disease)     Hyperlipidemia     Hypertension     Hypogonadism in male     Insomnia     Secondary polycythemia        Objective:    Vitals:  Vitals:    07/23/24 1138 07/23/24 1203   BP: (!) 172/80 (!) 146/98   Pulse: 80    SpO2: 96%    Weight: 126 kg (277 lb 10.7 oz)    Height: 6' (1.829 m)    PainSc: 0-No pain        Physical Exam  Vitals and nursing note reviewed.   Constitutional:       General: He is not in acute distress.     Appearance: He is obese.   HENT:      Head: Normocephalic and atraumatic.      Right Ear: Tympanic membrane, ear canal and external ear normal.      Left Ear: Tympanic membrane, ear canal and external ear normal.      Mouth/Throat:      Mouth: Mucous membranes are moist.      Pharynx: Oropharynx is clear.   Eyes:      General: No scleral icterus.     Conjunctiva/sclera: Conjunctivae normal.   Cardiovascular:      Rate and Rhythm: Normal rate and  regular rhythm.   Pulmonary:      Effort: Pulmonary effort is normal. No respiratory distress.      Breath sounds: Normal breath sounds.   Musculoskeletal:      Right lower leg: No edema.      Left lower leg: No edema.   Skin:     General: Skin is warm and dry.   Neurological:      Mental Status: He is alert and oriented to person, place, and time.   Psychiatric:         Mood and Affect: Mood normal.         Behavior: Behavior normal.         Thought Content: Thought content normal.         Data:  CMP  Sodium   Date Value Ref Range Status   11/01/2023 142 136 - 145 mmol/L Final     Potassium   Date Value Ref Range Status   11/01/2023 4.4 3.5 - 5.1 mmol/L Final     Chloride   Date Value Ref Range Status   11/01/2023 101 95 - 110 mmol/L Final     CO2   Date Value Ref Range Status   11/01/2023 30 (H) 23 - 29 mmol/L Final     Glucose   Date Value Ref Range Status   11/01/2023 76 70 - 110 mg/dL Final     BUN   Date Value Ref Range Status   11/01/2023 19 6 - 20 mg/dL Final     Creatinine   Date Value Ref Range Status   11/01/2023 0.9 0.5 - 1.4 mg/dL Final     Calcium   Date Value Ref Range Status   11/01/2023 9.8 8.7 - 10.5 mg/dL Final     Total Protein   Date Value Ref Range Status   08/03/2023 8.1 6.0 - 8.4 g/dL Final     Albumin   Date Value Ref Range Status   08/03/2023 4.0 3.5 - 5.2 g/dL Final     Total Bilirubin   Date Value Ref Range Status   08/03/2023 0.8 0.1 - 1.0 mg/dL Final     Comment:     For infants and newborns, interpretation of results should be based  on gestational age, weight and in agreement with clinical  observations.    Premature Infant recommended reference ranges:  Up to 24 hours.............<8.0 mg/dL  Up to 48 hours............<12.0 mg/dL  3-5 days..................<15.0 mg/dL  6-29 days.................<15.0 mg/dL       Alkaline Phosphatase   Date Value Ref Range Status   08/03/2023 82 55 - 135 U/L Final     AST   Date Value Ref Range Status   08/03/2023 20 10 - 40 U/L Final     ALT   Date  Value Ref Range Status   08/03/2023 23 10 - 44 U/L Final     Anion Gap   Date Value Ref Range Status   11/01/2023 11 8 - 16 mmol/L Final     eGFR   Date Value Ref Range Status   11/01/2023 >60 >60 mL/min/1.73 m^2 Final      The 10-year ASCVD risk score (Anurag EGAN, et al., 2019) is: 10.4%    Values used to calculate the score:      Age: 58 years      Sex: Male      Is Non- : No      Diabetic: No      Tobacco smoker: No      Systolic Blood Pressure: 146 mmHg      Is BP treated: Yes      HDL Cholesterol: 46 mg/dL      Total Cholesterol: 215 mg/dL      Medical history reviewed, Medications reconciled.              ANIKA Mckeon-C  Family Medicine

## 2024-08-13 ENCOUNTER — LAB VISIT (OUTPATIENT)
Dept: LAB | Facility: HOSPITAL | Age: 59
End: 2024-08-13
Payer: MEDICAID

## 2024-08-13 DIAGNOSIS — Z13.1 SCREENING FOR DIABETES MELLITUS: ICD-10-CM

## 2024-08-13 DIAGNOSIS — Z12.5 PROSTATE CANCER SCREENING: ICD-10-CM

## 2024-08-13 DIAGNOSIS — Z00.00 PREVENTATIVE HEALTH CARE: ICD-10-CM

## 2024-08-13 DIAGNOSIS — I10 HYPERTENSION, UNSPECIFIED TYPE: ICD-10-CM

## 2024-08-13 LAB
ALBUMIN SERPL BCP-MCNC: 3.9 G/DL (ref 3.5–5.2)
ALP SERPL-CCNC: 59 U/L (ref 55–135)
ALT SERPL W/O P-5'-P-CCNC: 16 U/L (ref 10–44)
ANION GAP SERPL CALC-SCNC: 8 MMOL/L (ref 8–16)
AST SERPL-CCNC: 16 U/L (ref 10–40)
BILIRUB SERPL-MCNC: 0.6 MG/DL (ref 0.1–1)
BUN SERPL-MCNC: 18 MG/DL (ref 6–20)
CALCIUM SERPL-MCNC: 9.8 MG/DL (ref 8.7–10.5)
CHLORIDE SERPL-SCNC: 105 MMOL/L (ref 95–110)
CHOLEST SERPL-MCNC: 204 MG/DL (ref 120–199)
CHOLEST/HDLC SERPL: 3.9 {RATIO} (ref 2–5)
CO2 SERPL-SCNC: 26 MMOL/L (ref 23–29)
COMPLEXED PSA SERPL-MCNC: 0.58 NG/ML (ref 0–4)
CREAT SERPL-MCNC: 0.9 MG/DL (ref 0.5–1.4)
ERYTHROCYTE [DISTWIDTH] IN BLOOD BY AUTOMATED COUNT: 12.5 % (ref 11.5–14.5)
EST. GFR  (NO RACE VARIABLE): >60 ML/MIN/1.73 M^2
ESTIMATED AVG GLUCOSE: 91 MG/DL (ref 68–131)
GLUCOSE SERPL-MCNC: 97 MG/DL (ref 70–110)
HBA1C MFR BLD: 4.8 % (ref 4–5.6)
HCT VFR BLD AUTO: 50.9 % (ref 40–54)
HDLC SERPL-MCNC: 52 MG/DL (ref 40–75)
HDLC SERPL: 25.5 % (ref 20–50)
HGB BLD-MCNC: 16.3 G/DL (ref 14–18)
LDLC SERPL CALC-MCNC: 135.2 MG/DL (ref 63–159)
MCH RBC QN AUTO: 30.6 PG (ref 27–31)
MCHC RBC AUTO-ENTMCNC: 32 G/DL (ref 32–36)
MCV RBC AUTO: 96 FL (ref 82–98)
NONHDLC SERPL-MCNC: 152 MG/DL
PLATELET # BLD AUTO: 224 K/UL (ref 150–450)
PMV BLD AUTO: 10 FL (ref 9.2–12.9)
POTASSIUM SERPL-SCNC: 5.3 MMOL/L (ref 3.5–5.1)
PROT SERPL-MCNC: 6.8 G/DL (ref 6–8.4)
RBC # BLD AUTO: 5.33 M/UL (ref 4.6–6.2)
SODIUM SERPL-SCNC: 139 MMOL/L (ref 136–145)
TRIGL SERPL-MCNC: 84 MG/DL (ref 30–150)
TSH SERPL DL<=0.005 MIU/L-ACNC: 3.63 UIU/ML (ref 0.4–4)
WBC # BLD AUTO: 5.45 K/UL (ref 3.9–12.7)

## 2024-08-13 PROCEDURE — 80053 COMPREHEN METABOLIC PANEL: CPT | Performed by: NURSE PRACTITIONER

## 2024-08-13 PROCEDURE — 85027 COMPLETE CBC AUTOMATED: CPT | Performed by: NURSE PRACTITIONER

## 2024-08-13 PROCEDURE — 80061 LIPID PANEL: CPT | Performed by: NURSE PRACTITIONER

## 2024-08-13 PROCEDURE — 84153 ASSAY OF PSA TOTAL: CPT | Performed by: NURSE PRACTITIONER

## 2024-08-13 PROCEDURE — 83036 HEMOGLOBIN GLYCOSYLATED A1C: CPT | Performed by: NURSE PRACTITIONER

## 2024-08-13 PROCEDURE — 36415 COLL VENOUS BLD VENIPUNCTURE: CPT | Mod: PN | Performed by: NURSE PRACTITIONER

## 2024-08-13 PROCEDURE — 84443 ASSAY THYROID STIM HORMONE: CPT | Performed by: NURSE PRACTITIONER

## 2024-09-05 ENCOUNTER — PATIENT MESSAGE (OUTPATIENT)
Dept: ADMINISTRATIVE | Facility: HOSPITAL | Age: 59
End: 2024-09-05
Payer: MEDICAID

## 2024-10-31 ENCOUNTER — HOSPITAL ENCOUNTER (EMERGENCY)
Facility: HOSPITAL | Age: 59
Discharge: HOME | End: 2024-10-31
Attending: STUDENT IN AN ORGANIZED HEALTH CARE EDUCATION/TRAINING PROGRAM
Payer: MEDICAID

## 2024-10-31 ENCOUNTER — TELEPHONE (OUTPATIENT)
Dept: FAMILY MEDICINE | Facility: CLINIC | Age: 59
End: 2024-10-31
Payer: MEDICAID

## 2024-10-31 VITALS
HEIGHT: 72 IN | SYSTOLIC BLOOD PRESSURE: 132 MMHG | OXYGEN SATURATION: 95 % | DIASTOLIC BLOOD PRESSURE: 83 MMHG | WEIGHT: 275 LBS | TEMPERATURE: 98 F | RESPIRATION RATE: 20 BRPM | HEART RATE: 88 BPM | BODY MASS INDEX: 37.25 KG/M2

## 2024-10-31 DIAGNOSIS — M70.71 BURSITIS OF BOTH HIPS, UNSPECIFIED BURSA: ICD-10-CM

## 2024-10-31 DIAGNOSIS — M70.72 BURSITIS OF BOTH HIPS, UNSPECIFIED BURSA: ICD-10-CM

## 2024-10-31 DIAGNOSIS — M25.551 BILATERAL HIP PAIN: Primary | ICD-10-CM

## 2024-10-31 DIAGNOSIS — M25.552 BILATERAL HIP PAIN: Primary | ICD-10-CM

## 2024-10-31 PROCEDURE — 99284 EMERGENCY DEPT VISIT MOD MDM: CPT

## 2024-10-31 RX ORDER — HYDROCODONE BITARTRATE AND ACETAMINOPHEN 5; 325 MG/1; MG/1
1 TABLET ORAL EVERY 6 HOURS PRN
Qty: 12 TABLET | Refills: 0 | Status: SHIPPED | OUTPATIENT
Start: 2024-10-31

## 2024-10-31 RX ORDER — PREDNISONE 20 MG/1
40 TABLET ORAL DAILY
Qty: 10 TABLET | Refills: 0 | Status: SHIPPED | OUTPATIENT
Start: 2024-10-31 | End: 2024-11-05

## 2024-11-01 DIAGNOSIS — M25.552 BILATERAL HIP PAIN: Primary | ICD-10-CM

## 2024-11-01 DIAGNOSIS — M25.551 BILATERAL HIP PAIN: Primary | ICD-10-CM

## 2024-11-01 NOTE — DISCHARGE INSTRUCTIONS
Take prednisone as prescribed daily x5 days    May take Norco 5s as prescribed every 6 hours if pain    Follow up with ortho as referred

## 2024-11-01 NOTE — ED PROVIDER NOTES
"Encounter Date: 10/31/2024       History     Chief Complaint   Patient presents with    Hip Pain     Pt states " my hips"" bursitis inflamed" c/o bilat pain since 10 days ago, pt states " my right hip it's a sever eight, my left hip is a five six"   Pt's wife reports pt was on a ladder decorating for halloween and has had hip pain ever since, denies fall/trauma      59-year-old male with a history of bilateral hip bursitis, GERD, hypertension, polycythemia, hyperlipidemia, ADD.  Presents to ED with chief complaints of bilateral hip pain after working outside putting up Halloween decorations over the past few days. Denies fall or trauma.  Has not taken any NSAIDs, or Tylenol. He says that those do not work for him. He denies associated motor or sensory deficits.    The history is provided by the patient. No  was used.     Review of patient's allergies indicates:  No Known Allergies  Past Medical History:   Diagnosis Date    ADD (attention deficit disorder with hyperactivity) 04/15/2013    GERD (gastroesophageal reflux disease)     Hyperlipidemia     Hypertension     Hypogonadism in male     Insomnia     Secondary polycythemia      Past Surgical History:   Procedure Laterality Date    ARTHROSCOPIC CHONDROPLASTY OF KNEE JOINT Right 10/10/2022    Procedure: ARTHROSCOPY, KNEE, WITH CHONDROPLASTY;  Surgeon: Herbie Hoyt MD;  Location: [facility] OR;  Service: Orthopedics;  Laterality: Right;    COLONOSCOPY N/A 11/06/2018    Dr. Chadwick; unremarkable; repeat in 10 years    EXCISION OF MEDIAL MENISCUS OF KNEE Right 10/10/2022    Procedure: MENISCECTOMY, KNEE, MEDIAL;  Surgeon: Herbie Hoyt MD;  Location: [facility] OR;  Service: Orthopedics;  Laterality: Right;    HERNIA REPAIR  2000    L knee scope      L shoulder surgery      TOTAL KNEE ARTHROPLASTY Right 11/15/2023    Procedure: ARTHROPLASTY, KNEE, TOTAL;  Surgeon: Navarro Lockhart II, MD;  Location: [facility];  Service: Orthopedics;  Laterality: " Right;  Ronny Self    VASECTOMY       Family History   Problem Relation Name Age of Onset    Stroke Mother Naomi Echevarria    Colon cancer Father          ?    Stomach cancer Neg Hx      Esophageal cancer Neg Hx      Crohn's disease Neg Hx      Ulcerative colitis Neg Hx      Glaucoma Neg Hx      Macular degeneration Neg Hx      Retinal detachment Neg Hx       Social History     Tobacco Use    Smoking status: Never     Passive exposure: Never    Smokeless tobacco: Never   Substance Use Topics    Alcohol use: Never    Drug use: Never     Review of Systems   Constitutional: Negative.    HENT: Negative.     Eyes: Negative.    Respiratory: Negative.     Cardiovascular: Negative.    Gastrointestinal: Negative.    Endocrine: Negative.    Genitourinary: Negative.    Musculoskeletal:  Positive for arthralgias.   Skin: Negative.    Neurological: Negative.    Hematological: Negative.    Psychiatric/Behavioral: Negative.     All other systems reviewed and are negative.      Physical Exam     Initial Vitals [10/31/24 2223]   BP Pulse Resp Temp SpO2   132/83 88 20 98.1 °F (36.7 °C) 95 %      MAP       --         Physical Exam    Nursing note and vitals reviewed.  Constitutional: He appears well-developed.   HENT:   Head: Normocephalic.   Eyes: Pupils are equal, round, and reactive to light.   Neck:   Normal range of motion.  Cardiovascular:  Normal rate.           Pulmonary/Chest: Breath sounds normal. No respiratory distress.   Abdominal: Abdomen is soft.   Musculoskeletal:         General: Normal range of motion.      Cervical back: Normal range of motion.      Comments: TTP bilateral greater trochanters.       Neurological: He is alert and oriented to person, place, and time. GCS score is 15. GCS eye subscore is 4. GCS verbal subscore is 5. GCS motor subscore is 6.   Skin: Skin is warm. Capillary refill takes less than 2 seconds.         ED Course   Procedures  Labs Reviewed - No data to display       Imaging Results    None           Medications - No data to display  Medical Decision Making  59-year-old male with a history of bursitis presenting with flare-up of bursitis refractory to NSAIDs, Tylenol.  Neurovascularly intact.  Rx short course of Norco, prednisone.  Patient was seen and reevaluated. Patient's symptoms seem to be stable. I discussed the patient's diagnosis, treatment plan, and plan for discharge with the patient. Patient was instructed to follow up with ortho and was given strict return precautions to the ED. The patient voiced understanding and agreed with the plan      Risk  Prescription drug management.                                      Clinical Impression:  Final diagnoses:  [M25.551, M25.552] Bilateral hip pain (Primary)  [M70.71, M70.72] Bursitis of both hips, unspecified bursa          ED Disposition Condition    Discharge Stable          ED Prescriptions       Medication Sig Dispense Start Date End Date Auth. Provider    HYDROcodone-acetaminophen (NORCO) 5-325 mg per tablet Take 1 tablet by mouth every 6 (six) hours as needed for Pain. 12 tablet 10/31/2024 -- Shane White MD    predniSONE (DELTASONE) 20 MG tablet Take 2 tablets (40 mg total) by mouth once daily. for 5 days 10 tablet 10/31/2024 11/5/2024 Shane White MD          Follow-up Information       Follow up With Specialties Details Why Contact Info    Vitaly Woody MD Family Medicine  As needed [address]  [place] [address]  [phone]               Shane White MD  10/31/24 1739

## 2024-11-18 DIAGNOSIS — I10 HYPERTENSION, UNSPECIFIED TYPE: ICD-10-CM

## 2024-11-19 RX ORDER — TELMISARTAN AND HYDROCHLORTHIAZIDE 40; 12.5 MG/1; MG/1
1 TABLET ORAL
Qty: 90 TABLET | Refills: 0 | Status: SHIPPED | OUTPATIENT
Start: 2024-11-19

## 2024-11-19 NOTE — TELEPHONE ENCOUNTER
Refill Routing Note   Medication(s) are not appropriate for processing by [facility] for the following reason(s):        Non-participating provider    [facility] action(s):  Route               Appointments  past 12m or future 3m with PCP    Date Provider   Last Visit   7/23/2024 Joanna Pavon FNP   Next Visit   Visit date not found Joanna Pavon FNP   ED visits in past 90 days: 1        Note composed:6:33 PM 11/18/2024

## 2025-03-28 DIAGNOSIS — I10 HYPERTENSION, UNSPECIFIED TYPE: ICD-10-CM

## 2025-03-28 NOTE — TELEPHONE ENCOUNTER
No care due was identified.  [facility] Embedded Care Due Messages. Reference number: [id number].   3/28/2025 5:33:16 PM CDT

## 2025-03-29 NOTE — TELEPHONE ENCOUNTER
Refill Routing Note   Medication(s) are not appropriate for processing by [facility] for the following reason(s):        Patient not seen by provider within 15 months  ED/Hospital Visit since last OV with provider  Required labs outdated    [facility] action(s):  Defer           Extended chart review required: Yes     Appointments  past 12m or future 3m with PCP    Date Provider   Last Visit   8/3/2023 Vitaly Woody MD   Next Visit   Visit date not found Vitaly Woody MD   ED visits in past 90 days: 0        Note composed:11:33 PM 03/28/2025

## 2025-03-30 ENCOUNTER — HOSPITAL ENCOUNTER (EMERGENCY)
Facility: HOSPITAL | Age: 60
Discharge: OTHER INSTITUTION | End: 2025-03-30
Attending: EMERGENCY MEDICINE
Payer: MEDICAID

## 2025-03-30 VITALS
OXYGEN SATURATION: 95 % | WEIGHT: 275 LBS | HEIGHT: 72 IN | BODY MASS INDEX: 37.25 KG/M2 | RESPIRATION RATE: 15 BRPM | SYSTOLIC BLOOD PRESSURE: 138 MMHG | HEART RATE: 83 BPM | DIASTOLIC BLOOD PRESSURE: 68 MMHG | TEMPERATURE: 98 F

## 2025-03-30 DIAGNOSIS — L02.511 ABSCESS OF FINGER OF RIGHT HAND: Primary | ICD-10-CM

## 2025-03-30 DIAGNOSIS — L03.011 CELLULITIS OF RIGHT INDEX FINGER: ICD-10-CM

## 2025-03-30 DIAGNOSIS — L03.113 CELLULITIS OF RIGHT HAND: ICD-10-CM

## 2025-03-30 LAB
ABSOLUTE EOSINOPHIL (SMH): 0.12 K/UL
ABSOLUTE MONOCYTE (SMH): 0.79 K/UL (ref 0.3–1)
ABSOLUTE NEUTROPHIL COUNT (SMH): 7.1 K/UL (ref 1.8–7.7)
ALBUMIN SERPL-MCNC: 3.9 G/DL (ref 3.5–5.2)
ALLENS TEST: NORMAL
ALP SERPL-CCNC: 97 UNIT/L (ref 40–150)
ALT SERPL-CCNC: 18 UNIT/L (ref 10–44)
ANION GAP (SMH): 11 MMOL/L (ref 8–16)
AST SERPL-CCNC: 19 UNIT/L (ref 11–45)
BASOPHILS # BLD AUTO: 0.03 K/UL
BASOPHILS NFR BLD AUTO: 0.3 %
BILIRUB SERPL-MCNC: 0.5 MG/DL (ref 0.1–1)
BUN SERPL-MCNC: 23 MG/DL (ref 6–20)
CALCIUM SERPL-MCNC: 9.2 MG/DL (ref 8.7–10.5)
CHLORIDE SERPL-SCNC: 105 MMOL/L (ref 95–110)
CO2 SERPL-SCNC: 23 MMOL/L (ref 23–29)
CREAT SERPL-MCNC: 1.2 MG/DL (ref 0.5–1.4)
DELSYS: NORMAL
ERYTHROCYTE [DISTWIDTH] IN BLOOD BY AUTOMATED COUNT: 13.3 % (ref 11.5–14.5)
FIO2: 21
GFR SERPLBLD CREATININE-BSD FMLA CKD-EPI: >60 ML/MIN/1.73/M2
GLUCOSE SERPL-MCNC: 121 MG/DL (ref 70–110)
HCT VFR BLD AUTO: 47.7 % (ref 40–54)
HGB BLD-MCNC: 15.3 GM/DL (ref 14–18)
IMM GRANULOCYTES # BLD AUTO: 0.02 K/UL (ref 0–0.04)
IMM GRANULOCYTES NFR BLD AUTO: 0.2 % (ref 0–0.5)
LDH SERPL L TO P-CCNC: 1.66 MMOL/L (ref 0.5–2.2)
LYMPHOCYTES # BLD AUTO: 0.87 K/UL (ref 1–4.8)
MCH RBC QN AUTO: 28 PG (ref 27–31)
MCHC RBC AUTO-ENTMCNC: 32.1 G/DL (ref 32–36)
MCV RBC AUTO: 87 FL (ref 82–98)
MODE: NORMAL
NUCLEATED RBC (/100WBC) (SMH): 0 /100 WBC
PLATELET # BLD AUTO: 226 K/UL (ref 150–450)
PMV BLD AUTO: 9.5 FL (ref 9.2–12.9)
POTASSIUM SERPL-SCNC: 4 MMOL/L (ref 3.5–5.1)
PROCALCITONIN SERPL-MCNC: 0.05 NG/ML
PROT SERPL-MCNC: 6.6 GM/DL (ref 6–8.4)
RBC # BLD AUTO: 5.47 M/UL (ref 4.6–6.2)
RELATIVE EOSINOPHIL (SMH): 1.4 % (ref 0–8)
RELATIVE LYMPHOCYTE (SMH): 9.8 % (ref 18–48)
RELATIVE MONOCYTE (SMH): 8.9 % (ref 4–15)
RELATIVE NEUTROPHIL (SMH): 79.4 % (ref 38–73)
SAMPLE: NORMAL
SITE: NORMAL
SODIUM SERPL-SCNC: 139 MMOL/L (ref 136–145)
SP02: 93
WBC # BLD AUTO: 8.88 K/UL (ref 3.9–12.7)

## 2025-03-30 PROCEDURE — 99285 EMERGENCY DEPT VISIT HI MDM: CPT | Mod: 25

## 2025-03-30 PROCEDURE — 63600175 PHARM REV CODE 636 W HCPCS: Performed by: EMERGENCY MEDICINE

## 2025-03-30 PROCEDURE — 96376 TX/PRO/DX INJ SAME DRUG ADON: CPT

## 2025-03-30 PROCEDURE — 96375 TX/PRO/DX INJ NEW DRUG ADDON: CPT

## 2025-03-30 PROCEDURE — 99900035 HC TECH TIME PER 15 MIN (STAT)

## 2025-03-30 PROCEDURE — 96365 THER/PROPH/DIAG IV INF INIT: CPT

## 2025-03-30 PROCEDURE — 85025 COMPLETE CBC W/AUTO DIFF WBC: CPT | Performed by: EMERGENCY MEDICINE

## 2025-03-30 PROCEDURE — 87040 BLOOD CULTURE FOR BACTERIA: CPT | Performed by: EMERGENCY MEDICINE

## 2025-03-30 PROCEDURE — 96366 THER/PROPH/DIAG IV INF ADDON: CPT

## 2025-03-30 PROCEDURE — 84145 PROCALCITONIN (PCT): CPT | Performed by: EMERGENCY MEDICINE

## 2025-03-30 PROCEDURE — 25000003 PHARM REV CODE 250: Performed by: EMERGENCY MEDICINE

## 2025-03-30 PROCEDURE — 80051 ELECTROLYTE PANEL: CPT | Performed by: EMERGENCY MEDICINE

## 2025-03-30 PROCEDURE — 36415 COLL VENOUS BLD VENIPUNCTURE: CPT | Performed by: EMERGENCY MEDICINE

## 2025-03-30 PROCEDURE — 83605 ASSAY OF LACTIC ACID: CPT

## 2025-03-30 RX ORDER — MORPHINE SULFATE 4 MG/ML
4 INJECTION, SOLUTION INTRAMUSCULAR; INTRAVENOUS
Refills: 0 | Status: COMPLETED | OUTPATIENT
Start: 2025-03-30 | End: 2025-03-30

## 2025-03-30 RX ORDER — ONDANSETRON HYDROCHLORIDE 2 MG/ML
4 INJECTION, SOLUTION INTRAVENOUS
Status: COMPLETED | OUTPATIENT
Start: 2025-03-30 | End: 2025-03-30

## 2025-03-30 RX ADMIN — BACITRACIN ZINC, NEOMYCIN, POLYMYXIN B 1 EACH: 400; 3.5; 5 OINTMENT TOPICAL at 10:03

## 2025-03-30 RX ADMIN — MORPHINE SULFATE 4 MG: 4 INJECTION INTRAVENOUS at 07:03

## 2025-03-30 RX ADMIN — VANCOMYCIN HYDROCHLORIDE 2000 MG: 2 INJECTION, POWDER, LYOPHILIZED, FOR SOLUTION INTRAVENOUS at 08:03

## 2025-03-30 RX ADMIN — MORPHINE SULFATE 4 MG: 4 INJECTION INTRAVENOUS at 10:03

## 2025-03-30 RX ADMIN — ONDANSETRON 4 MG: 2 INJECTION INTRAMUSCULAR; INTRAVENOUS at 07:03

## 2025-03-30 NOTE — ED TRIAGE NOTES
David A Hingle is here with ant bite to right 2nd finger on Tuesday and now swelling and painful.

## 2025-03-31 RX ORDER — TELMISARTAN AND HYDROCHLORTHIAZIDE 40; 12.5 MG/1; MG/1
1 TABLET ORAL
Qty: 90 TABLET | Refills: 3 | Status: SHIPPED | OUTPATIENT
Start: 2025-03-31

## 2025-03-31 NOTE — ED PROVIDER NOTES
"Encounter Date: 3/30/2025       History     Chief Complaint   Patient presents with    Hand Pain     Ant bite to right 2nd finger     Emergent eval of a 59-year-old male history of hypertension, low T, GERD, hyperlipidemia presents to the ER due to a right index finger infection.  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  He states that he had been bitten by ants 5-6 days ago .  Bites to the proximal right index finger as well as his right ankle.  Right ankle healed without difficulty, the right index finger developed a pustule and increase bruising, swelling, and pain with significant prog worse swelling yesterday.  He took  Bactrim twice a day for the past 3.5 days ( which he had " left over " from home) .  He reports that he had a large abscess on the index finger that he drained himself with sterile needle  with a large amount of purulent and bloody discharge. today developed worsening pain redness and swelling by this afternoon with throbbing in the finger.  He reports he did have nausea and decreased appetite yesterday.  No fevers chills or sweats.  He is not immunocompromised no history of diabetes.  He reports he is able to flex the finger without difficulty though this does cause pain.  In his also noticed redness swelling that has extended into the hand.  Tetanus within the last 5 years      Review of patient's allergies indicates:  No Known Allergies  Past Medical History:   Diagnosis Date    ADD (attention deficit disorder with hyperactivity) 04/15/2013    GERD (gastroesophageal reflux disease)     Hyperlipidemia     Hypertension     Hypogonadism in male     Insomnia     Secondary polycythemia      Past Surgical History:   Procedure Laterality Date    ARTHROSCOPIC CHONDROPLASTY OF KNEE JOINT Right 10/10/2022    Procedure: ARTHROSCOPY, KNEE, WITH CHONDROPLASTY;  Surgeon: Herbie Hoyt MD;  Location: [facility];  Service: Orthopedics;  Laterality: Right;    COLONOSCOPY N/A " 11/06/2018    Dr. Chadwick; unremarkable; repeat in 10 years    EXCISION OF MEDIAL MENISCUS OF KNEE Right 10/10/2022    Procedure: MENISCECTOMY, KNEE, MEDIAL;  Surgeon: Herbie Hoyt MD;  Location: [facility];  Service: Orthopedics;  Laterality: Right;    HERNIA REPAIR  2000    L knee scope      L shoulder surgery      TOTAL KNEE ARTHROPLASTY Right 11/15/2023    Procedure: ARTHROPLASTY, KNEE, TOTAL;  Surgeon: Navarro Lockhart II, MD;  Location: [facility];  Service: Orthopedics;  Laterality: Right;  Ronny Self    VASECTOMY       Family History   Problem Relation Name Age of Onset    Stroke Mother Naomi Aguilera 76    Colon cancer Father          ?    Stomach cancer Neg Hx      Esophageal cancer Neg Hx      Crohn's disease Neg Hx      Ulcerative colitis Neg Hx      Glaucoma Neg Hx      Macular degeneration Neg Hx      Retinal detachment Neg Hx       Social History[1]  Review of Systems   Constitutional:  Positive for appetite change. Negative for activity change, chills, diaphoresis, fatigue and fever.   Respiratory:  Negative for chest tightness and shortness of breath.    Gastrointestinal:  Positive for nausea. Negative for abdominal pain, constipation, diarrhea and vomiting.   Musculoskeletal:  Positive for arthralgias, joint swelling and myalgias. Negative for back pain, gait problem, neck pain and neck stiffness.   Skin:  Positive for color change and wound. Negative for pallor and rash.   Allergic/Immunologic: Negative for immunocompromised state.   Neurological:  Negative for dizziness, weakness, light-headedness, numbness and headaches.   Hematological:  Does not bruise/bleed easily.   Psychiatric/Behavioral:  Negative for agitation and confusion.    All other systems reviewed and are negative.      Physical Exam     Initial Vitals [03/30/25 1823]   BP Pulse Resp Temp SpO2   (!) 148/70 91 18 98.7 °F (37.1 °C) 95 %      MAP       --         Physical Exam    Nursing note and vitals reviewed.  Constitutional: He  appears well-developed and well-nourished. He is not diaphoretic. No distress.   HENT:   Head: Normocephalic and atraumatic.   Right Ear: External ear normal.   Left Ear: External ear normal.   Nose: Nose normal. Mouth/Throat: Oropharynx is clear and moist.   Eyes: Conjunctivae and EOM are normal. Pupils are equal, round, and reactive to light.   Neck: Neck supple. No tracheal deviation present.   Normal range of motion.  Cardiovascular:  Normal rate, regular rhythm, normal heart sounds and intact distal pulses.     Exam reveals no gallop and no friction rub.       No murmur heard.  Pulmonary/Chest: Breath sounds normal. No stridor. No respiratory distress. He has no wheezes. He has no rhonchi. He has no rales. He exhibits no tenderness.   Abdominal: He exhibits no distension.   Musculoskeletal:         General: No edema. Normal range of motion.      Cervical back: Normal range of motion and neck supple.      Comments: See pictures  No further purulent drainage able to be drained from the open wound  Able to flex at D IP PIP and MCP joint without difficulty- this does cause pain  No tenderness along flexor tendon  No fusiform edema  Cellulitis into hand on dorsal surface     Neurological: He is alert and oriented to person, place, and time. He has normal strength. No cranial nerve deficit or sensory deficit.   Skin: Skin is warm and dry. No rash noted. No erythema. No pallor.   Psychiatric: He has a normal mood and affect. His behavior is normal. Judgment and thought content normal.                   ED Course   Procedures  Labs Reviewed   COMPREHENSIVE METABOLIC PANEL - Abnormal       Result Value    Sodium 139      Potassium 4.0      Chloride 105      CO2 23      Glucose 121 (*)     BUN 23 (*)     Creatinine 1.2      Calcium 9.2      Protein Total 6.6      Albumin 3.9      Bilirubin Total 0.5      ALP 97      AST 19      ALT 18      Anion Gap 11      eGFR >60     CBC WITH DIFFERENTIAL - Abnormal    WBC 8.88       RBC 5.47      Hgb 15.3      Hct 47.7      MCV 87      MCH 28.0      MCHC 32.1      RDW 13.3      Platelet Count 226      MPV 9.5      Nucleated RBC 0      Neut % 79.4 (*)     Lymph % 9.8 (*)     Mono % 8.9      Eos % 1.4      Basophil % 0.3      Imm Grans % 0.2      Neut # 7.1      Lymph # 0.87 (*)     Mono # 0.79      Eos # 0.12      Baso # 0.03      Imm Grans # 0.02     PROCALCITONIN - Normal    Procalcitonin 0.05     CULTURE, BLOOD   CULTURE, BLOOD   CBC W/ AUTO DIFFERENTIAL    Narrative:     The following orders were created for panel order CBC auto differential.  Procedure                               Abnormality         Status                     ---------                               -----------         ------                     CBC with Differential[[id number]]       Abnormal            Final result                 Please view results for these tests on the individual orders.   ISTAT LACTATE    POC Lactate 1.66      Sample VENOUS      Site Other      Allens Test N/A      DelSys Room Air      Mode SPONT      FiO2 21      Sp02 93            Imaging Results              X-Ray Finger 2 or More Views Right (Final result)  Result time 03/30/25 20:25:09      Final result by Willie Boyle MD (03/30/25 20:25:09)                   Impression:      Soft tissue swelling of the right index finger.  Correlate for cellulitis.  No underlying bony abnormality to suggest osteomyelitis from plain film.      Electronically signed by: Willie Boyle  Date:    03/30/2025  Time:    20:25               Narrative:    EXAMINATION:  XR FINGER 2 OR MORE VIEWS RIGHT    CLINICAL HISTORY:  index finger cellulitis and abscess;    TECHNIQUE:  Three views of the right index finger    COMPARISON:  01/05/2004    FINDINGS:  Soft tissue swelling of the digit.  No underlying bony or joint space abnormality.  No radiopaque foreign body.                                       Medications   vancomycin 2,000 mg in 0.9% NaCl 500 mL IVPB  "(admixture device) (2,000 mg Intravenous New Bag 3/30/25 2004)   morphine injection 4 mg (4 mg Intravenous Given 3/30/25 1945)   ondansetron injection 4 mg (4 mg Intravenous Given 3/30/25 1945)     Medical Decision Making  Emergent eval of a 59-year-old male history of hypertension, low T, GERD, hyperlipidemia presents to the ER due to a right index finger infection.  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  He states that he had been bitten by ants 5-6 days ago .  Bites to the proximal right index finger as well as his right ankle.  Right ankle healed without difficulty, the right index finger developed a pustule and increase bruising, swelling, and pain with significant prog worse swelling yesterday.  He took  Bactrim twice a day for the past 3.5 days ( which he had " left over " from home) .  He reports that he had a large abscess on the index finger that he drained himself with sterile needle  with a large amount of purulent and bloody discharge. today developed worsening pain redness and swelling by this afternoon with throbbing in the finger.  He reports he did have nausea and decreased appetite yesterday.  No fevers chills or sweats.  He is not immunocompromised no history of diabetes.  He reports he is able to flex the finger without difficulty though this does cause pain.  In his also noticed redness swelling that has extended into the hand.  Tetanus within the last 5 years  On physical exam vital signs are normal blood pressure 148/70 afebrile 98.7 pulse 91 respirations 18 sats 95% clear breath sounds normal cardiac exam.  Awake alert oriented no altered mental status or confusion  Patient has a abscess with cellulitis to the right index finger proximal digit that has been drained with surrounding bruising.  No further purulent discharge able to be expressed.  Cellulitis of the finger extending into the dorsal hand  No no lymphangitis  MDM    Patient presents for emergent " evaluation of acute ant bite to right index finger 5-6 days ago developed abscess despite draining abscess and being on Bactrim symptoms have progressively worsened that poses a possible threat to life and/or bodily function.    Differential diagnosis includes but is not limited to abscess, cellulitis of right finger and hand possible flexor tenosynovitis, bacteremia, osteomyelitis, necrotizing fasciitis  In the ED patient found to have acute abscess cellulitis of right index finger and right hand  I ordered labs and personally reviewed them.  Labs significant for see below     I ordered X-rays right index finger and personally reviewed them and reviewed the radiologist interpretation.  Xray significant for see above.     Transfer MDM  I discussed the patient presentation labs,  X-rays  findings with the Hospitalist and consultant(s) hand surgery  Patient was managed in the ED with IV vancomycin 2.5 g IV based on patient's weight for likely MRSA abscess and cellulitis, morphine and Zofran for pain and nausea.  NPO  Sepsis order set ordered based on patient with localized infection failing outpatient antibiotics.  Patient is not require the IV fluid bolus I do not suspect patient to be bacteremic blood cultures will be collected.  Patient is not septic normal lactate normal white count normal pro count.  Therefore did not receive the IV fluid bolus .  Working on transfer now  The response to treatment was good.  Accepted to [facility] by Dr. Judith Mancini. ED to ED transfer   Patient required emergent consultation to local hand surgeon for transfer and admission.  Agustina Duffy M.D.       Amount and/or Complexity of Data Reviewed  Labs: ordered. Decision-making details documented in ED Course.     Details: Procalcitonin is also negative  Radiology: ordered and independent interpretation performed.    Risk  Prescription drug management.               ED Course as of 03/30/25 2141   [place]ucker Mar 30, 2025   1954 Poct lactic 1.6   [RM]   2021 Normal CBC with a white count 8.8 [RM]   2053 CMP with BUN 23 otherwise normal [RM]      ED Course User Index  [RM] Agustina Duffy MD                           Clinical Impression:  Final diagnoses:  [L02.511] Abscess of finger of right hand (Primary)  [L03.011] Cellulitis of right index finger  [L03.113] Cellulitis of right hand          ED Disposition Condition    Transfer to Another Facility Stable                  Agustina Duffy MD  03/30/25 2141         [1]   Social History  Tobacco Use    Smoking status: Never     Passive exposure: Never    Smokeless tobacco: Never   Substance Use Topics    Alcohol use: Never    Drug use: Never        Agustina Duffy MD  03/30/25 2201

## 2025-03-31 NOTE — CARE UPDATE
03/30/25 1945   Patient Assessment/Suction   Level of Consciousness (AVPU) alert   PRE-TX-O2   SpO2 (!) 94 %   Pulse 91   Resp 15   Labs   $ Was an ABG obtained? POCT - Lactate;Venous Puncture  (lab drawn)     1.66 Given to Dr. Duffy

## 2025-03-31 NOTE — ED NOTES
David Aguilera now accepted in transfer to [facility] ED by Dr. Mancini; call report to:  [phone].  Transfer Center arranging transport.

## 2025-04-05 LAB
BACTERIA BLD CULT: NORMAL
BACTERIA BLD CULT: NORMAL

## 2025-04-28 ENCOUNTER — TELEPHONE (OUTPATIENT)
Dept: FAMILY MEDICINE | Facility: CLINIC | Age: 60
End: 2025-04-28
Payer: MEDICAID

## 2025-04-28 NOTE — TELEPHONE ENCOUNTER
----- Message from [employer] sent at 4/28/2025  9:57 AM CDT -----  Contact: PT  Type:  Sooner Appointment RequestCaller is requesting a sooner appointment.  Caller declined first available appointment listed below.  Caller will not accept being placed on the waitlist and is requesting a message be sent to doctor.Name of Caller:  PTWhen is the first available appointment?  No solutionsSymptoms:  Symptoms: Sore Throat, Dizziness, Sinus SymptomsOutcome: Schedule an appointment to be seen within 24 hours.Reason: Caller denied all higher acuity questionsThe caller accepted this outcome.Would the patient rather a call back or a response via [facility]?  [facility] Call Back Number:  [phone] Information:  PT would like to be seen today or tomorrow if possible

## 2025-04-29 ENCOUNTER — CLINICAL SUPPORT (OUTPATIENT)
Dept: CARDIOLOGY | Facility: HOSPITAL | Age: 60
End: 2025-04-29
Attending: EMERGENCY MEDICINE
Payer: MEDICAID

## 2025-04-29 ENCOUNTER — HOSPITAL ENCOUNTER (OUTPATIENT)
Facility: HOSPITAL | Age: 60
Discharge: HOME | End: 2025-05-01
Attending: EMERGENCY MEDICINE | Admitting: INTERNAL MEDICINE
Payer: MEDICAID

## 2025-04-29 DIAGNOSIS — R42 VERTIGO: Primary | ICD-10-CM

## 2025-04-29 DIAGNOSIS — R07.9 CHEST PAIN: ICD-10-CM

## 2025-04-29 LAB
ABSOLUTE EOSINOPHIL (SMH): 0.08 K/UL
ABSOLUTE MONOCYTE (SMH): 0.67 K/UL (ref 0.3–1)
ABSOLUTE NEUTROPHIL COUNT (SMH): 6.8 K/UL (ref 1.8–7.7)
ALBUMIN SERPL-MCNC: 4.2 G/DL (ref 3.5–5.2)
ALP SERPL-CCNC: 124 UNIT/L (ref 40–150)
ALT SERPL-CCNC: 65 UNIT/L (ref 10–44)
ANION GAP (SMH): 11 MMOL/L (ref 8–16)
AORTIC ROOT ANNULUS: 3.4 CM
AORTIC VALVE CUSP SEPERATION: 2.2 CM
APICAL FOUR CHAMBER EJECTION FRACTION: 56 %
APICAL TWO CHAMBER EJECTION FRACTION: 53 %
AST SERPL-CCNC: 61 UNIT/L (ref 11–45)
AV INDEX (PROSTH): 0.88
AV MEAN GRADIENT: 4 MMHG
AV PEAK GRADIENT: 8 MMHG
AV VALVE AREA BY VELOCITY RATIO: 2.9 CM²
AV VALVE AREA: 2.8 CM²
AV VELOCITY RATIO: 0.93
BASOPHILS # BLD AUTO: 0.03 K/UL
BASOPHILS NFR BLD AUTO: 0.3 %
BILIRUB SERPL-MCNC: 0.4 MG/DL (ref 0.1–1)
BSA FOR ECHO PROCEDURE: 2.58 M2
BUN SERPL-MCNC: 24 MG/DL (ref 6–20)
CALCIUM SERPL-MCNC: 9.2 MG/DL (ref 8.7–10.5)
CHLORIDE SERPL-SCNC: 102 MMOL/L (ref 95–110)
CO2 SERPL-SCNC: 25 MMOL/L (ref 23–29)
CREAT SERPL-MCNC: 0.9 MG/DL (ref 0.5–1.4)
CV ECHO LV RWT: 0.51 CM
DOP CALC AO PEAK VEL: 1.4 M/S
DOP CALC AO VTI: 29 CM
DOP CALC LVOT AREA: 3.1 CM2
DOP CALC LVOT DIAMETER: 2 CM
DOP CALC LVOT PEAK VEL: 1.3 M/S
DOP CALC LVOT STROKE VOLUME: 80.4 CM3
DOP CALC MV VTI: 37.6 CM
DOP CALCLVOT PEAK VEL VTI: 25.6 CM
E WAVE DECELERATION TIME: 218 MSEC
E/A RATIO: 1.2
E/E' RATIO: 9 M/S
ECHO LV POSTERIOR WALL: 1.3 CM (ref 0.6–1.1)
ERYTHROCYTE [DISTWIDTH] IN BLOOD BY AUTOMATED COUNT: 13.4 % (ref 11.5–14.5)
FRACTIONAL SHORTENING: 27.5 % (ref 28–44)
GFR SERPLBLD CREATININE-BSD FMLA CKD-EPI: >60 ML/MIN/1.73/M2
GLUCOSE SERPL-MCNC: 142 MG/DL (ref 70–110)
HCT VFR BLD AUTO: 51.6 % (ref 40–54)
HCV AB SERPL QL IA: NORMAL
HGB BLD-MCNC: 17.1 GM/DL (ref 14–18)
HIV 1+2 AB+HIV1 P24 AG SERPL QL IA: NORMAL
IMM GRANULOCYTES # BLD AUTO: 0.04 K/UL (ref 0–0.04)
IMM GRANULOCYTES NFR BLD AUTO: 0.4 % (ref 0–0.5)
INFLUENZA A MOLECULAR (OHS): NEGATIVE
INFLUENZA B MOLECULAR (OHS): NEGATIVE
INTERVENTRICULAR SEPTUM: 1.3 CM (ref 0.6–1.1)
IVC DIAMETER: 2.53 CM
LEFT ATRIUM AREA SYSTOLIC (APICAL 2 CHAMBER): 17.8 CM2
LEFT ATRIUM AREA SYSTOLIC (APICAL 4 CHAMBER): 12.6 CM2
LEFT INTERNAL DIMENSION IN SYSTOLE: 3.7 CM (ref 2.1–4)
LEFT VENTRICLE DIASTOLIC VOLUME INDEX: 49.4 ML/M2
LEFT VENTRICLE DIASTOLIC VOLUME: 123 ML
LEFT VENTRICLE END DIASTOLIC VOLUME APICAL 2 CHAMBER: 56.8 ML
LEFT VENTRICLE END DIASTOLIC VOLUME APICAL 4 CHAMBER: 85.1 ML
LEFT VENTRICLE END SYSTOLIC VOLUME APICAL 2 CHAMBER: 45.4 ML
LEFT VENTRICLE END SYSTOLIC VOLUME APICAL 4 CHAMBER: 29.5 ML
LEFT VENTRICLE MASS INDEX: 108.5 G/M2
LEFT VENTRICLE SYSTOLIC VOLUME INDEX: 22.9 ML/M2
LEFT VENTRICLE SYSTOLIC VOLUME: 57 ML
LEFT VENTRICULAR INTERNAL DIMENSION IN DIASTOLE: 5.1 CM (ref 3.5–6)
LEFT VENTRICULAR MASS: 270.1 G
LV LATERAL E/E' RATIO: 7.1 M/S
LV SEPTAL E/E' RATIO: 10.6 M/S
LVED V (TEICH): 123 ML
LVES V (TEICH): 56.6 ML
LVOT MG: 3 MMHG
LVOT MV: 0.79 CM/S
LYMPHOCYTES # BLD AUTO: 1.46 K/UL (ref 1–4.8)
MAGNESIUM SERPL-MCNC: 2 MG/DL (ref 1.6–2.6)
MCH RBC QN AUTO: 27.9 PG (ref 27–31)
MCHC RBC AUTO-ENTMCNC: 33.1 G/DL (ref 32–36)
MCV RBC AUTO: 84 FL (ref 82–98)
MV MEAN GRADIENT: 2 MMHG
MV PEAK A VEL: 0.71 M/S
MV PEAK E VEL: 0.85 M/S
MV PEAK GRADIENT: 4 MMHG
MV VALVE AREA BY CONTINUITY EQUATION: 2.14 CM2
NT-PROBNP SERPL-MCNC: 97 PG/ML
NUCLEATED RBC (/100WBC) (SMH): 0 /100 WBC
OHS LV EJECTION FRACTION SIMPSONS BIPLANE MOD: 53 %
OHS QRS DURATION: 98 MS
OHS QTC CALCULATION: 440 MS
PLATELET # BLD AUTO: 205 K/UL (ref 150–450)
PMV BLD AUTO: 9.3 FL (ref 9.2–12.9)
POTASSIUM SERPL-SCNC: 3.8 MMOL/L (ref 3.5–5.1)
PROT SERPL-MCNC: 7.4 GM/DL (ref 6–8.4)
PV MV: 0.71 M/S
PV PEAK GRADIENT: 4 MMHG
PV PEAK VELOCITY: 1.06 M/S
RA PRESSURE ESTIMATED: 8 MMHG
RBC # BLD AUTO: 6.12 M/UL (ref 4.6–6.2)
RELATIVE EOSINOPHIL (SMH): 0.9 % (ref 0–8)
RELATIVE LYMPHOCYTE (SMH): 16 % (ref 18–48)
RELATIVE MONOCYTE (SMH): 7.4 % (ref 4–15)
RELATIVE NEUTROPHIL (SMH): 75 % (ref 38–73)
RV TISSUE DOPPLER FREE WALL SYSTOLIC VELOCITY 1 (APICAL 4 CHAMBER VIEW): 10.6 CM/S
SARS-COV-2 RDRP RESP QL NAA+PROBE: NEGATIVE
SODIUM SERPL-SCNC: 138 MMOL/L (ref 136–145)
TDI LATERAL: 0.12 M/S
TDI SEPTAL: 0.08 M/S
TDI: 0.1 M/S
TRICUSPID ANNULAR PLANE SYSTOLIC EXCURSION: 2.4 CM
TROPONIN HIGH SENSITIVE (SMH): 4.6 PG/ML
TROPONIN I SERPL HS-MCNC: <3 NG/L
TROPONIN I SERPL HS-MCNC: <3 NG/L
TSH SERPL-ACNC: 2.69 UIU/ML (ref 0.34–5.6)
WBC # BLD AUTO: 9.11 K/UL (ref 3.9–12.7)
Z-SCORE OF LEFT VENTRICULAR DIMENSION IN END DIASTOLE: -9.79
Z-SCORE OF LEFT VENTRICULAR DIMENSION IN END SYSTOLE: -6.15

## 2025-04-29 PROCEDURE — 85025 COMPLETE CBC W/AUTO DIFF WBC: CPT | Performed by: EMERGENCY MEDICINE

## 2025-04-29 PROCEDURE — 93306 TTE W/DOPPLER COMPLETE: CPT

## 2025-04-29 PROCEDURE — 84484 ASSAY OF TROPONIN QUANT: CPT | Performed by: STUDENT IN AN ORGANIZED HEALTH CARE EDUCATION/TRAINING PROGRAM

## 2025-04-29 PROCEDURE — 63600175 PHARM REV CODE 636 W HCPCS: Performed by: STUDENT IN AN ORGANIZED HEALTH CARE EDUCATION/TRAINING PROGRAM

## 2025-04-29 PROCEDURE — 93306 TTE W/DOPPLER COMPLETE: CPT | Mod: 26,,, | Performed by: INTERNAL MEDICINE

## 2025-04-29 PROCEDURE — 80053 COMPREHEN METABOLIC PANEL: CPT | Performed by: EMERGENCY MEDICINE

## 2025-04-29 PROCEDURE — 96372 THER/PROPH/DIAG INJ SC/IM: CPT | Performed by: STUDENT IN AN ORGANIZED HEALTH CARE EDUCATION/TRAINING PROGRAM

## 2025-04-29 PROCEDURE — G0378 HOSPITAL OBSERVATION PER HR: HCPCS

## 2025-04-29 PROCEDURE — 99900031 HC PATIENT EDUCATION (STAT)

## 2025-04-29 PROCEDURE — 87502 INFLUENZA DNA AMP PROBE: CPT | Performed by: EMERGENCY MEDICINE

## 2025-04-29 PROCEDURE — 83735 ASSAY OF MAGNESIUM: CPT | Performed by: EMERGENCY MEDICINE

## 2025-04-29 PROCEDURE — U0002 COVID-19 LAB TEST NON-CDC: HCPCS | Performed by: EMERGENCY MEDICINE

## 2025-04-29 PROCEDURE — 99285 EMERGENCY DEPT VISIT HI MDM: CPT | Mod: 25

## 2025-04-29 PROCEDURE — 87389 HIV-1 AG W/HIV-1&-2 AB AG IA: CPT | Performed by: EMERGENCY MEDICINE

## 2025-04-29 PROCEDURE — 83036 HEMOGLOBIN GLYCOSYLATED A1C: CPT | Performed by: STUDENT IN AN ORGANIZED HEALTH CARE EDUCATION/TRAINING PROGRAM

## 2025-04-29 PROCEDURE — 36415 COLL VENOUS BLD VENIPUNCTURE: CPT | Performed by: INTERNAL MEDICINE

## 2025-04-29 PROCEDURE — 25000003 PHARM REV CODE 250: Performed by: STUDENT IN AN ORGANIZED HEALTH CARE EDUCATION/TRAINING PROGRAM

## 2025-04-29 PROCEDURE — 84484 ASSAY OF TROPONIN QUANT: CPT | Performed by: EMERGENCY MEDICINE

## 2025-04-29 PROCEDURE — 84443 ASSAY THYROID STIM HORMONE: CPT | Performed by: STUDENT IN AN ORGANIZED HEALTH CARE EDUCATION/TRAINING PROGRAM

## 2025-04-29 PROCEDURE — 94761 N-INVAS EAR/PLS OXIMETRY MLT: CPT

## 2025-04-29 PROCEDURE — 83880 ASSAY OF NATRIURETIC PEPTIDE: CPT | Performed by: EMERGENCY MEDICINE

## 2025-04-29 PROCEDURE — 86803 HEPATITIS C AB TEST: CPT | Performed by: EMERGENCY MEDICINE

## 2025-04-29 PROCEDURE — 99900035 HC TECH TIME PER 15 MIN (STAT)

## 2025-04-29 PROCEDURE — 36415 COLL VENOUS BLD VENIPUNCTURE: CPT | Performed by: STUDENT IN AN ORGANIZED HEALTH CARE EDUCATION/TRAINING PROGRAM

## 2025-04-29 PROCEDURE — 84484 ASSAY OF TROPONIN QUANT: CPT | Performed by: INTERNAL MEDICINE

## 2025-04-29 RX ORDER — ACETAMINOPHEN 325 MG/1
650 TABLET ORAL EVERY 8 HOURS PRN
Status: DISCONTINUED | OUTPATIENT
Start: 2025-04-29 | End: 2025-05-01 | Stop reason: HOSPADM

## 2025-04-29 RX ORDER — ALUMINUM HYDROXIDE, MAGNESIUM HYDROXIDE, AND SIMETHICONE 1200; 120; 1200 MG/30ML; MG/30ML; MG/30ML
30 SUSPENSION ORAL 4 TIMES DAILY PRN
Status: DISCONTINUED | OUTPATIENT
Start: 2025-04-29 | End: 2025-05-01 | Stop reason: HOSPADM

## 2025-04-29 RX ORDER — SODIUM,POTASSIUM PHOSPHATES 280-250MG
2 POWDER IN PACKET (EA) ORAL
Status: DISCONTINUED | OUTPATIENT
Start: 2025-04-29 | End: 2025-05-01 | Stop reason: HOSPADM

## 2025-04-29 RX ORDER — TALC
6 POWDER (GRAM) TOPICAL NIGHTLY PRN
Status: DISCONTINUED | OUTPATIENT
Start: 2025-04-29 | End: 2025-05-01 | Stop reason: HOSPADM

## 2025-04-29 RX ORDER — ASPIRIN 325 MG
325 TABLET ORAL
Status: DISCONTINUED | OUTPATIENT
Start: 2025-04-29 | End: 2025-04-29

## 2025-04-29 RX ORDER — SODIUM CHLORIDE 0.9 % (FLUSH) 0.9 %
10 SYRINGE (ML) INJECTION EVERY 12 HOURS PRN
Status: DISCONTINUED | OUTPATIENT
Start: 2025-04-29 | End: 2025-05-01 | Stop reason: HOSPADM

## 2025-04-29 RX ORDER — LANOLIN ALCOHOL/MO/W.PET/CERES
800 CREAM (GRAM) TOPICAL
Status: DISCONTINUED | OUTPATIENT
Start: 2025-04-29 | End: 2025-05-01 | Stop reason: HOSPADM

## 2025-04-29 RX ORDER — HYDROCODONE BITARTRATE AND ACETAMINOPHEN 5; 325 MG/1; MG/1
1 TABLET ORAL EVERY 6 HOURS PRN
Refills: 0 | Status: DISCONTINUED | OUTPATIENT
Start: 2025-04-29 | End: 2025-05-01 | Stop reason: HOSPADM

## 2025-04-29 RX ORDER — TESTOSTERONE UNDECANOATE 237 MG/1
237 CAPSULE, LIQUID FILLED ORAL 2 TIMES DAILY
COMMUNITY

## 2025-04-29 RX ORDER — ACETAMINOPHEN 325 MG/1
650 TABLET ORAL EVERY 4 HOURS PRN
Status: DISCONTINUED | OUTPATIENT
Start: 2025-04-29 | End: 2025-05-01 | Stop reason: HOSPADM

## 2025-04-29 RX ORDER — IBUPROFEN 200 MG
24 TABLET ORAL
Status: DISCONTINUED | OUTPATIENT
Start: 2025-04-29 | End: 2025-05-01 | Stop reason: HOSPADM

## 2025-04-29 RX ORDER — NALOXONE HCL 0.4 MG/ML
0.02 VIAL (ML) INJECTION
Status: DISCONTINUED | OUTPATIENT
Start: 2025-04-29 | End: 2025-05-01 | Stop reason: HOSPADM

## 2025-04-29 RX ORDER — ENOXAPARIN SODIUM 100 MG/ML
40 INJECTION SUBCUTANEOUS EVERY 24 HOURS
Status: DISCONTINUED | OUTPATIENT
Start: 2025-04-29 | End: 2025-05-01 | Stop reason: HOSPADM

## 2025-04-29 RX ORDER — IBUPROFEN 200 MG
16 TABLET ORAL
Status: DISCONTINUED | OUTPATIENT
Start: 2025-04-29 | End: 2025-05-01 | Stop reason: HOSPADM

## 2025-04-29 RX ORDER — AMOXICILLIN 250 MG
1 CAPSULE ORAL DAILY
Status: DISCONTINUED | OUTPATIENT
Start: 2025-04-29 | End: 2025-05-01 | Stop reason: HOSPADM

## 2025-04-29 RX ORDER — ONDANSETRON HYDROCHLORIDE 2 MG/ML
4 INJECTION, SOLUTION INTRAVENOUS EVERY 6 HOURS PRN
Status: DISCONTINUED | OUTPATIENT
Start: 2025-04-29 | End: 2025-05-01 | Stop reason: HOSPADM

## 2025-04-29 RX ORDER — TADALAFIL 20 MG/1
20 TABLET ORAL DAILY PRN
COMMUNITY
Start: 2025-03-06

## 2025-04-29 RX ORDER — MECLIZINE HCL 12.5 MG 12.5 MG/1
25 TABLET ORAL 3 TIMES DAILY PRN
Status: DISCONTINUED | OUTPATIENT
Start: 2025-04-29 | End: 2025-05-01 | Stop reason: HOSPADM

## 2025-04-29 RX ORDER — ASPIRIN 81 MG/1
81 TABLET ORAL 2 TIMES DAILY
Status: DISCONTINUED | OUTPATIENT
Start: 2025-04-29 | End: 2025-05-01 | Stop reason: HOSPADM

## 2025-04-29 RX ORDER — GLUCAGON 1 MG
1 KIT INJECTION
Status: DISCONTINUED | OUTPATIENT
Start: 2025-04-29 | End: 2025-05-01 | Stop reason: HOSPADM

## 2025-04-29 RX ADMIN — ASPIRIN 81 MG: 81 TABLET, COATED ORAL at 08:04

## 2025-04-29 RX ADMIN — ACETAMINOPHEN 650 MG: 325 TABLET ORAL at 04:04

## 2025-04-29 RX ADMIN — ENOXAPARIN SODIUM 40 MG: 40 INJECTION SUBCUTANEOUS at 04:04

## 2025-04-29 RX ADMIN — Medication 6 MG: at 08:04

## 2025-04-29 RX ADMIN — MECLIZINE 25 MG: 12.5 TABLET ORAL at 09:04

## 2025-04-29 NOTE — ED NOTES
PTA patient states he had dizziness, cold chills, vomiting and sweats. Patient also had chest pain but could not specify where it was located.

## 2025-04-29 NOTE — SUBJECTIVE & OBJECTIVE
Past Medical History:   Diagnosis Date    ADD (attention deficit disorder with hyperactivity) 04/15/2013    GERD (gastroesophageal reflux disease)     Hyperlipidemia     Hypertension     Hypogonadism in male     Insomnia     Secondary polycythemia        Past Surgical History:   Procedure Laterality Date    ARTHROSCOPIC CHONDROPLASTY OF KNEE JOINT Right 10/10/2022    Procedure: ARTHROSCOPY, KNEE, WITH CHONDROPLASTY;  Surgeon: Herbie Hoyt MD;  Location: [facility] OR;  Service: Orthopedics;  Laterality: Right;    COLONOSCOPY N/A 11/06/2018    Dr. Chadwick; unremarkable; repeat in 10 years    EXCISION OF MEDIAL MENISCUS OF KNEE Right 10/10/2022    Procedure: MENISCECTOMY, KNEE, MEDIAL;  Surgeon: Herbie Hoyt MD;  Location: [facility] OR;  Service: Orthopedics;  Laterality: Right;    HERNIA REPAIR  2000    L knee scope      L shoulder surgery      TOTAL KNEE ARTHROPLASTY Right 11/15/2023    Procedure: ARTHROPLASTY, KNEE, TOTAL;  Surgeon: Navarro Lockhart II, MD;  Location: [facility] OR;  Service: Orthopedics;  Laterality: Right;  Ronny Self    VASECTOMY         Review of patient's allergies indicates:  No Known Allergies    No current facility-administered medications on file prior to encounter.     Current Outpatient Medications on File Prior to Encounter   Medication Sig    aspirin (ECOTRIN) 81 MG EC tablet Take 1 tablet (81 mg total) by mouth 2 (two) times a day. for 14 days    dextroamphetamine-amphetamine (AMPHETAMINE SALT COMBO) 30 mg Tab Take 1 tablet by mouth 3 (three) times daily.     tadalafiL (CIALIS) 20 MG Tab Take 20 mg by mouth daily as needed (ED).    telmisartan-hydrochlorothiazide (MICARDIS HCT) 40-12.5 mg per tablet TAKE 1 TABLET BY MOUTH DAILY (Patient taking differently: Take 1 tablet by mouth nightly.)    testosterone undecanoate (JATENZO) 237 mg Cap Take 237 mg by mouth 2 (two) times daily.    HYDROcodone-acetaminophen (NORCO) 5-325 mg per tablet Take 1 tablet by mouth every 6 (six) hours as  needed for Pain. (Patient not taking: Reported on 4/29/2025)    HYDROcodone-acetaminophen (NORCO) 7.5-325 mg per tablet Take 1 tablet by mouth every 6 (six) hours as needed for Pain. (Patient not taking: Reported on 7/23/2024)    [DISCONTINUED] AMBIEN 10 mg Tab Take 10 mg by mouth every evening. (Patient not taking: Reported on 7/23/2024)    [DISCONTINUED] EScitalopram oxalate (LEXAPRO) 20 MG tablet Take 20 mg by mouth.    [DISCONTINUED] ondansetron (ZOFRAN-ODT) 8 MG TbDL Take 1 tablet (8 mg total) by mouth 2 (two) times daily as needed. (Patient not taking: Reported on 7/23/2024)    [DISCONTINUED] testosterone cypionate (DEPOTESTOTERONE CYPIONATE) 200 mg/mL injection INJECT 1 ML (200MG) INTRAMUSCULARLY EVERY 14 DAYS     Family History       Problem Relation (Age of Onset)    Colon cancer Father    Stroke Mother (76)          Tobacco Use    Smoking status: Never     Passive exposure: Never    Smokeless tobacco: Never   Substance and Sexual Activity    Alcohol use: Never    Drug use: Never    Sexual activity: Yes     Partners: Female     Review of Systems   All other systems reviewed and are negative.    Objective:     Vital Signs (Most Recent):  Temp: 97.7 °F (36.5 °C) (04/29/25 1356)  Pulse: 63 (04/29/25 1356)  Resp: 18 (04/29/25 1356)  BP: 128/74 (04/29/25 1356)  SpO2: 96 % (04/29/25 1356) Vital Signs (24h Range):  Temp:  [97.6 °F (36.4 °C)-98 °F (36.7 °C)] 97.7 °F (36.5 °C)  Pulse:  [62-82] 63  Resp:  [16-20] 18  SpO2:  [95 %-97 %] 96 %  BP: (112-147)/(66-86) 128/74     Weight: 131.5 kg (289 lb 14.5 oz)  Body mass index is 39.32 kg/m².     Physical Exam  Constitutional:       Appearance: Normal appearance.      Comments: Obese white male   HENT:      Head: Normocephalic and atraumatic.      Nose: Nose normal.      Mouth/Throat:      Mouth: Mucous membranes are moist.      Pharynx: Oropharynx is clear.   Eyes:      Pupils: Pupils are equal, round, and reactive to light.   Cardiovascular:      Rate and Rhythm:  Normal rate and regular rhythm.   Pulmonary:      Effort: Pulmonary effort is normal.      Breath sounds: Normal breath sounds.   Abdominal:      General: Bowel sounds are normal.      Palpations: Abdomen is soft.   Musculoskeletal:         General: Normal range of motion.      Cervical back: Normal range of motion.   Skin:     General: Skin is warm and dry.   Neurological:      General: No focal deficit present.      Mental Status: He is alert and oriented to person, place, and time.   Psychiatric:         Mood and Affect: Mood normal.         Behavior: Behavior normal.              CRANIAL NERVES     CN III, IV, VI   Pupils are equal, round, and reactive to light.       Significant Labs: All pertinent labs within the past 24 hours have been reviewed.    Significant Imaging: I have reviewed all pertinent imaging results/findings within the past 24 hours.

## 2025-04-29 NOTE — ASSESSMENT & PLAN NOTE
Patient's blood pressure range in the last 24 hours was: BP  Min: 112/68  Max: 147/77.The patient's inpatient anti-hypertensive regimen is listed below:  Current Antihypertensives       Plan  - BP is controlled, no changes needed to their regimen  - hold home anthypertensives due to hypotension today. Monitor per unit protocol.

## 2025-04-29 NOTE — ED PROVIDER NOTES
Encounter Date: 4/29/2025       History     Chief Complaint   Patient presents with    Chest Pain    Nausea    Dizziness     Patient presents emergency department with reported onset of shortness of breath chest tightness diaphoresis nausea with 1 episode of vomiting felt like he was going to pass out symptoms started while he was going to bed this morning lasted proximally 20 minutes improved EN route to the hospital no diarrhea states he has a have some antecedent upper respiratory illness last week he has a history of hypertension hyperlipidemia polycythemia no history of heart disease        Review of patient's allergies indicates:  No Known Allergies  Past Medical History:   Diagnosis Date    ADD (attention deficit disorder with hyperactivity) 04/15/2013    GERD (gastroesophageal reflux disease)     Hyperlipidemia     Hypertension     Hypogonadism in male     Insomnia     Secondary polycythemia      Past Surgical History:   Procedure Laterality Date    ARTHROSCOPIC CHONDROPLASTY OF KNEE JOINT Right 10/10/2022    Procedure: ARTHROSCOPY, KNEE, WITH CHONDROPLASTY;  Surgeon: Herbie Hoyt MD;  Location: [facility] OR;  Service: Orthopedics;  Laterality: Right;    COLONOSCOPY N/A 11/06/2018    Dr. Chadwick; unremarkable; repeat in 10 years    EXCISION OF MEDIAL MENISCUS OF KNEE Right 10/10/2022    Procedure: MENISCECTOMY, KNEE, MEDIAL;  Surgeon: Herbie Hoyt MD;  Location: [facility] OR;  Service: Orthopedics;  Laterality: Right;    HERNIA REPAIR  2000    L knee scope      L shoulder surgery      TOTAL KNEE ARTHROPLASTY Right 11/15/2023    Procedure: ARTHROPLASTY, KNEE, TOTAL;  Surgeon: Navarro Lockhart II, MD;  Location: [facility] OR;  Service: Orthopedics;  Laterality: Right;  Ronny Self    VASECTOMY       Family History   Problem Relation Name Age of Onset    Stroke Mother Naomi Aguilera 76    Colon cancer Father          ?    Stomach cancer Neg Hx      Esophageal cancer Neg Hx      Crohn's disease Neg Hx       Ulcerative colitis Neg Hx      Glaucoma Neg Hx      Macular degeneration Neg Hx      Retinal detachment Neg Hx       Social History[1]  Review of Systems   Constitutional:  Positive for diaphoresis. Negative for activity change, appetite change and fever.   HENT:  Positive for sore throat.    Respiratory:  Positive for chest tightness and shortness of breath. Negative for cough.    Gastrointestinal:  Positive for nausea and vomiting. Negative for abdominal pain.   Neurological:  Positive for light-headedness.       Physical Exam     Initial Vitals [04/29/25 0435]   BP Pulse Resp Temp SpO2   (!) 147/77 69 16 97.6 °F (36.4 °C) 96 %      MAP       --         Physical Exam    Constitutional: He appears well-developed and well-nourished. No distress.   HENT:   Head: Normocephalic and atraumatic.   Right Ear: External ear normal.   Left Ear: External ear normal. Mouth/Throat: Oropharynx is clear and moist.   Eyes: Pupils are equal, round, and reactive to light.   Neck: Neck supple.   Normal range of motion.  Cardiovascular:  Normal rate, regular rhythm, S1 normal, S2 normal, normal heart sounds and intact distal pulses.           Pulmonary/Chest: Breath sounds normal.   Abdominal: Abdomen is soft. Bowel sounds are normal. There is no abdominal tenderness.   Musculoskeletal:         General: Normal range of motion.      Cervical back: Normal range of motion and neck supple.     Neurological: He is alert and oriented to person, place, and time. GCS score is 15. GCS eye subscore is 4. GCS verbal subscore is 5. GCS motor subscore is 6.   Skin: Skin is warm and dry. No rash noted.   Psychiatric: He has a normal mood and affect. His behavior is normal.         ED Course   Procedures  Labs Reviewed   COMPREHENSIVE METABOLIC PANEL - Abnormal       Result Value    Sodium 138      Potassium 3.8      Chloride 102      CO2 25      Glucose 142 (*)     BUN 24 (*)     Creatinine 0.9      Calcium 9.2      Protein Total 7.4      Albumin  4.2      Bilirubin Total 0.4            AST 61 (*)     ALT 65 (*)     Anion Gap 11      eGFR >60     CBC WITH DIFFERENTIAL - Abnormal    WBC 9.11      RBC 6.12      Hgb 17.1      Hct 51.6      MCV 84      MCH 27.9      MCHC 33.1      RDW 13.4      Platelet Count 205      MPV 9.3      Nucleated RBC 0      Neut % 75.0 (*)     Lymph % 16.0 (*)     Mono % 7.4      Eos % 0.9      Basophil % 0.3      Imm Grans % 0.4      Neut # 6.8      Lymph # 1.46      Mono # 0.67      Eos # 0.08      Baso # 0.03      Imm Grans # 0.04     INFLUENZA A & B BY MOLECULAR - Normal    INFLUENZA A MOLECULAR Negative      INFLUENZA B MOLECULAR  Negative     MAGNESIUM - Normal    Magnesium 2.0     TROPONIN I HIGH SENSITIVITY - Normal    Troponin High Sensitive <3     NT-PRO NATRIURETIC PEPTIDE - Normal    NT-proBNP 97     HEPATITIS C ANTIBODY - Normal    Hep C Ab Interp Non-Reactive     HIV 1 / 2 ANTIBODY - Normal    HIV 1/2 Ag/Ab Non-Reactive     SARS-COV-2 RNA AMPLIFICATION, QUAL - Normal    SARS COV-2 Molecular Negative     CBC W/ AUTO DIFFERENTIAL    Narrative:     The following orders were created for panel order CBC auto differential.  Procedure                               Abnormality         Status                     ---------                               -----------         ------                     CBC with Differential[[id number]]       Abnormal            Final result                 Please view results for these tests on the individual orders.          Imaging Results              X-Ray Chest AP Portable (Final result)  Result time 04/29/25 06:59:03      Final result by Suleiman Terrell MD (04/29/25 06:59:03)                   Impression:      No convincing evidence of acute cardiopulmonary disease.      Electronically signed by: Suleiman Terrell  Date:    04/29/2025  Time:    06:59               Narrative:    EXAMINATION:  XR CHEST AP PORTABLE    CLINICAL HISTORY:  Chest Pain;    TECHNIQUE:  Single frontal view of the  chest was performed.    COMPARISON:  Chest radiograph performed 11/01/2023    FINDINGS:  Monitoring leads over the chest.    Cardiac contours appear grossly within normal limits    Lungs essentially clear.  No definite pneumothorax or large volume pleural effusion.    No acute findings in the visualized abdomen.  Osseous and soft tissue structures appear without definite acute change.                                       Medications   aspirin tablet 325 mg (325 mg Oral Not Given 4/29/25 0430)     Medical Decision Making  EKG shows normal sinus rhythm without acute ST or T-wave changes initial troponins negative BNP is normal influenza COVID are negative chest x-ray shows no evidence of infiltrates or pneumothorax given patient's multiple risk factors and very concerning story for chest pain will admit for further cardiac evaluation I have consulted Hospital Medicine for admission  I discussed patient's findings with Dr. Harrison as requested by [facility] Medicine Dr. Harrison feels patient is best served at [facility] for stress testing will admit to [facility].    Amount and/or Complexity of Data Reviewed  Labs: ordered. Decision-making details documented in ED Course.  Radiology: ordered. Decision-making details documented in ED Course.  ECG/medicine tests: ordered. Decision-making details documented in ED Course.    Risk  OTC drugs.  Decision regarding hospitalization.                                      Clinical Impression:  Final diagnoses:  [R07.9] Chest pain          ED Disposition Condition    Admit                   Juan Thapa MD  04/29/25 0622         [1]   Social History  Tobacco Use    Smoking status: Never     Passive exposure: Never    Smokeless tobacco: Never   Substance Use Topics    Alcohol use: Never    Drug use: Never        Juan Thapa MD  04/29/25 8843

## 2025-04-29 NOTE — PHARMACY MED REC
"Admission Medication History     The home medication history was taken by Wagner Sandoval.    You may go to "Admission" then "Reconcile Home Medications" tabs to review and/or act upon these items.     The home medication list has been updated by the Pharmacy department.   Please read ALL comments highlighted in yellow.   Please address this information as you see fit.    Feel free to contact us if you have any questions or require assistance.      The medications listed below were removed from the home medication list. Please reorder if appropriate:  Patient reports no longer taking the following medication(s):  Lexapro 20 mg  Ambien 10 mg    Medications listed below were obtained from: Patient/family and Analytic software- [employer]  No current facility-administered medications on file prior to encounter.     Current Outpatient Medications on File Prior to Encounter   Medication Sig Dispense Refill    aspirin (ECOTRIN) 81 MG EC tablet Take 1 tablet (81 mg total) by mouth 2 (two) times a day. for 14 days 28 tablet 0    dextroamphetamine-amphetamine (AMPHETAMINE SALT COMBO) 30 mg Tab Take 1 tablet by mouth 3 (three) times daily.       tadalafiL (CIALIS) 20 MG Tab Take 20 mg by mouth daily as needed (ED).      telmisartan-hydrochlorothiazide (MICARDIS HCT) 40-12.5 mg per tablet TAKE 1 TABLET BY MOUTH DAILY (Patient taking differently: Take 1 tablet by mouth nightly.) 90 tablet 3    testosterone undecanoate (JATENZO) 237 mg Cap Take 237 mg by mouth 2 (two) times daily.      HYDROcodone-acetaminophen (NORCO) 5-325 mg per tablet Take 1 tablet by mouth every 6 (six) hours as needed for Pain. (Patient not taking: Reported on 4/29/2025) 12 tablet 0    HYDROcodone-acetaminophen (NORCO) 7.5-325 mg per tablet Take 1 tablet by mouth every 6 (six) hours as needed for Pain. (Patient not taking: Reported on 7/23/2024) 28 tablet 0    [DISCONTINUED] AMBIEN 10 mg Tab Take 10 mg by mouth every evening. (Patient not taking: Reported on " 7/23/2024)      [DISCONTINUED] EScitalopram oxalate (LEXAPRO) 20 MG tablet Take 20 mg by mouth.      [DISCONTINUED] ondansetron (ZOFRAN-ODT) 8 MG TbDL Take 1 tablet (8 mg total) by mouth 2 (two) times daily as needed. (Patient not taking: Reported on 7/23/2024) 30 tablet 0    [DISCONTINUED] testosterone cypionate (DEPOTESTOTERONE CYPIONATE) 200 mg/mL injection INJECT 1 ML (200MG) INTRAMUSCULARLY EVERY 14 DAYS 2 mL 0           Wagner Sandoval  EXT 1919                .

## 2025-04-29 NOTE — ASSESSMENT & PLAN NOTE
Defer cardiology until workup is complete and warranted.  Echo  Stress in AM  Repeat troponin, 2 have been negative thus far.  TSH  A1c

## 2025-04-29 NOTE — HPI
Mr Aguilera is a 59-year-old male with a history of hypertension.  Over the course of 5 days, he has dealt with postnasal drip which he says has progressed into full body aches and just feeling overall fatigued.  Last night he dealt with some stressful situations concerning his family, he went to bed and awoke around 4:00 a.m. with low blood pressure, states was very dizzy, could not walk.  He became nauseated and vomited once, this was associated with chest pain.  He presented to the emergency department at [facility] and was transferred here for further evaluation.  He denies smoking or alcohol use, his follow up has no known medical history to him, his mother had a stroke in her 80s.  He denies any fevers.

## 2025-04-29 NOTE — H&P
[facility] Medicine  History & Physical    Patient Name: David Aguilera  MRN: [id number]  Patient Class: OP- Observation  Admission Date: 4/29/2025  Attending Physician: Meghann Mandel MD   Primary Care Provider: Vitaly Woody MD         Patient information was obtained from patient and ER records.     Subjective:     Principal Problem:Chest pain    Chief Complaint:   Chief Complaint   Patient presents with    Chest Pain    Nausea    Dizziness        HPI: Mr Aguilera is a 59-year-old male with a history of hypertension.  Over the course of 5 days, he has dealt with postnasal drip which he says has progressed into full body aches and just feeling overall fatigued.  Last night he dealt with some stressful situations concerning his family, he went to bed and awoke around 4:00 a.m. with low blood pressure, states was very dizzy, could not walk.  He became nauseated and vomited once, this was associated with chest pain.  He presented to the emergency department at [facility] and was transferred here for further evaluation.  He denies smoking or alcohol use, his follow up has no known medical history to him, his mother had a stroke in her 80s.  He denies any fevers.    Past Medical History:   Diagnosis Date    ADD (attention deficit disorder with hyperactivity) 04/15/2013    GERD (gastroesophageal reflux disease)     Hyperlipidemia     Hypertension     Hypogonadism in male     Insomnia     Secondary polycythemia        Past Surgical History:   Procedure Laterality Date    ARTHROSCOPIC CHONDROPLASTY OF KNEE JOINT Right 10/10/2022    Procedure: ARTHROSCOPY, KNEE, WITH CHONDROPLASTY;  Surgeon: Herbie Hoyt MD;  [facility]: [facility];  Service: Orthopedics;  Laterality: Right;    COLONOSCOPY N/A 11/06/2018    Dr. Chadwick; unremarkable; repeat in 10 years    EXCISION OF MEDIAL MENISCUS OF KNEE Right 10/10/2022    Procedure: MENISCECTOMY, KNEE, MEDIAL;  Surgeon: Herbie Hoyt,  MD;  Location: [facility] OR;  Service: Orthopedics;  Laterality: Right;    HERNIA REPAIR  2000    L knee scope      L shoulder surgery      TOTAL KNEE ARTHROPLASTY Right 11/15/2023    Procedure: ARTHROPLASTY, KNEE, TOTAL;  Surgeon: Navarro Lockhart II, MD;  Location: [facility] OR;  Service: Orthopedics;  Laterality: Right;  Ronny Self    VASECTOMY         Review of patient's allergies indicates:  No Known Allergies    No current facility-administered medications on file prior to encounter.     Current Outpatient Medications on File Prior to Encounter   Medication Sig    aspirin (ECOTRIN) 81 MG EC tablet Take 1 tablet (81 mg total) by mouth 2 (two) times a day. for 14 days    dextroamphetamine-amphetamine (AMPHETAMINE SALT COMBO) 30 mg Tab Take 1 tablet by mouth 3 (three) times daily.     tadalafiL (CIALIS) 20 MG Tab Take 20 mg by mouth daily as needed (ED).    telmisartan-hydrochlorothiazide (MICARDIS HCT) 40-12.5 mg per tablet TAKE 1 TABLET BY MOUTH DAILY (Patient taking differently: Take 1 tablet by mouth nightly.)    testosterone undecanoate (JATENZO) 237 mg Cap Take 237 mg by mouth 2 (two) times daily.    HYDROcodone-acetaminophen (NORCO) 5-325 mg per tablet Take 1 tablet by mouth every 6 (six) hours as needed for Pain. (Patient not taking: Reported on 4/29/2025)    HYDROcodone-acetaminophen (NORCO) 7.5-325 mg per tablet Take 1 tablet by mouth every 6 (six) hours as needed for Pain. (Patient not taking: Reported on 7/23/2024)    [DISCONTINUED] AMBIEN 10 mg Tab Take 10 mg by mouth every evening. (Patient not taking: Reported on 7/23/2024)    [DISCONTINUED] EScitalopram oxalate (LEXAPRO) 20 MG tablet Take 20 mg by mouth.    [DISCONTINUED] ondansetron (ZOFRAN-ODT) 8 MG TbDL Take 1 tablet (8 mg total) by mouth 2 (two) times daily as needed. (Patient not taking: Reported on 7/23/2024)    [DISCONTINUED] testosterone cypionate (DEPOTESTOTERONE CYPIONATE) 200 mg/mL injection INJECT 1 ML (200MG) INTRAMUSCULARLY EVERY 14 DAYS      Family History       Problem Relation (Age of Onset)    Colon cancer Father    Stroke Mother (76)          Tobacco Use    Smoking status: Never     Passive exposure: Never    Smokeless tobacco: Never   Substance and Sexual Activity    Alcohol use: Never    Drug use: Never    Sexual activity: Yes     Partners: Female     Review of Systems   All other systems reviewed and are negative.    Objective:     Vital Signs (Most Recent):  Temp: 97.7 °F (36.5 °C) (04/29/25 1356)  Pulse: 63 (04/29/25 1356)  Resp: 18 (04/29/25 1356)  BP: 128/74 (04/29/25 1356)  SpO2: 96 % (04/29/25 1356) Vital Signs (24h Range):  Temp:  [97.6 °F (36.4 °C)-98 °F (36.7 °C)] 97.7 °F (36.5 °C)  Pulse:  [62-82] 63  Resp:  [16-20] 18  SpO2:  [95 %-97 %] 96 %  BP: (112-147)/(66-86) 128/74     Weight: 131.5 kg (289 lb 14.5 oz)  Body mass index is 39.32 kg/m².     Physical Exam  Constitutional:       Appearance: Normal appearance.      Comments: Obese white male   HENT:      Head: Normocephalic and atraumatic.      Nose: Nose normal.      Mouth/Throat:      Mouth: Mucous membranes are moist.      Pharynx: Oropharynx is clear.   Eyes:      Pupils: Pupils are equal, round, and reactive to light.   Cardiovascular:      Rate and Rhythm: Normal rate and regular rhythm.   Pulmonary:      Effort: Pulmonary effort is normal.      Breath sounds: Normal breath sounds.   Abdominal:      General: Bowel sounds are normal.      Palpations: Abdomen is soft.   Musculoskeletal:         General: Normal range of motion.      Cervical back: Normal range of motion.   Skin:     General: Skin is warm and dry.   Neurological:      General: No focal deficit present.      Mental Status: He is alert and oriented to person, place, and time.   Psychiatric:         Mood and Affect: Mood normal.         Behavior: Behavior normal.              CRANIAL NERVES     CN III, IV, VI   Pupils are equal, round, and reactive to light.       Significant Labs: All pertinent labs within the  past 24 hours have been reviewed.    Significant Imaging: I have reviewed all pertinent imaging results/findings within the past 24 hours.  Assessment/Plan:     Assessment & Plan  Chest pain  Defer cardiology until workup is complete and warranted.  Echo  Stress in AM  Repeat troponin, 2 have been negative thus far.  TSH  A1c      Essential hypertension  Patient's blood pressure range in the last 24 hours was: BP  Min: 112/68  Max: 147/77.The patient's inpatient anti-hypertensive regimen is listed below:  Current Antihypertensives       Plan  - BP is controlled, no changes needed to their regimen  - hold home anthypertensives due to hypotension today. Monitor per unit protocol.  VTE Risk Mitigation (From admission, onward)           Ordered     enoxaparin injection 40 mg  Every 24 hours         04/29/25 1424     IP VTE HIGH RISK PATIENT  Once         04/29/25 1424     Place sequential compression device  Until discontinued         04/29/25 1424                         On 04/29/2025, patient should be placed in hospital observation services under my care in collaboration with 33.           Jesus Solis NP  Department of Hospital Medicine  [facility]

## 2025-04-30 ENCOUNTER — TELEPHONE (OUTPATIENT)
Dept: FAMILY MEDICINE | Facility: CLINIC | Age: 60
End: 2025-04-30
Payer: MEDICAID

## 2025-04-30 ENCOUNTER — CLINICAL SUPPORT (OUTPATIENT)
Dept: CARDIOLOGY | Facility: HOSPITAL | Age: 60
End: 2025-04-30
Attending: EMERGENCY MEDICINE
Payer: MEDICAID

## 2025-04-30 PROBLEM — R42 DIZZINESS: Status: ACTIVE | Noted: 2025-04-30

## 2025-04-30 LAB
ABSOLUTE EOSINOPHIL (SMH): 0.23 K/UL
ABSOLUTE MONOCYTE (SMH): 0.61 K/UL (ref 0.3–1)
ABSOLUTE NEUTROPHIL COUNT (SMH): 4.2 K/UL (ref 1.8–7.7)
ALBUMIN SERPL-MCNC: 3.9 G/DL (ref 3.5–5.2)
ALP SERPL-CCNC: 99 UNIT/L (ref 55–135)
ALT SERPL-CCNC: 47 UNIT/L (ref 10–44)
ANION GAP (SMH): 7 MMOL/L (ref 8–16)
AST SERPL-CCNC: 26 UNIT/L (ref 10–40)
BASOPHILS # BLD AUTO: 0.04 K/UL
BASOPHILS NFR BLD AUTO: 0.6 %
BILIRUB SERPL-MCNC: 0.5 MG/DL (ref 0.1–1)
BUN SERPL-MCNC: 28 MG/DL (ref 6–20)
CALCIUM SERPL-MCNC: 8.8 MG/DL (ref 8.7–10.5)
CHLORIDE SERPL-SCNC: 104 MMOL/L (ref 95–110)
CO2 SERPL-SCNC: 28 MMOL/L (ref 23–29)
CREAT SERPL-MCNC: 0.9 MG/DL (ref 0.5–1.4)
CV PHARM DOSE: 0.4 MG
CV STRESS BASE HR: 64 BPM
DIASTOLIC BLOOD PRESSURE: 82 MMHG
EAG (SMH): 108 MG/DL (ref 68–131)
ERYTHROCYTE [DISTWIDTH] IN BLOOD BY AUTOMATED COUNT: 14.2 % (ref 11.5–14.5)
GFR SERPLBLD CREATININE-BSD FMLA CKD-EPI: >60 ML/MIN/1.73/M2
GLUCOSE SERPL-MCNC: 94 MG/DL (ref 70–110)
HBA1C MFR BLD: 5.4 % (ref 4.5–6.2)
HCT VFR BLD AUTO: 51.9 % (ref 40–54)
HGB BLD-MCNC: 17 GM/DL (ref 14–18)
IMM GRANULOCYTES # BLD AUTO: 0.03 K/UL (ref 0–0.04)
IMM GRANULOCYTES NFR BLD AUTO: 0.4 % (ref 0–0.5)
LYMPHOCYTES # BLD AUTO: 1.68 K/UL (ref 1–4.8)
MAGNESIUM SERPL-MCNC: 2 MG/DL (ref 1.6–2.6)
MCH RBC QN AUTO: 28.5 PG (ref 27–31)
MCHC RBC AUTO-ENTMCNC: 32.8 G/DL (ref 32–36)
MCV RBC AUTO: 87 FL (ref 82–98)
NUCLEATED RBC (/100WBC) (SMH): 0 /100 WBC
OHS CV CPX 1 MINUTE RECOVERY HEART RATE: 78 BPM
OHS CV CPX 85 PERCENT MAX PREDICTED HEART RATE MALE: 139
OHS CV CPX MAX PREDICTED HEART RATE: 163
OHS CV CPX PATIENT IS FEMALE: 0
OHS CV CPX PATIENT IS MALE: 1
OHS CV CPX PEAK DIASTOLIC BLOOD PRESSURE: 76 MMHG
OHS CV CPX PEAK HEAR RATE: 100 BPM
OHS CV CPX PEAK RATE PRESSURE PRODUCT: NORMAL
OHS CV CPX PEAK SYSTOLIC BLOOD PRESSURE: 132 MMHG
OHS CV CPX PERCENT MAX PREDICTED HEART RATE ACHIEVED: 61
OHS CV CPX RATE PRESSURE PRODUCT PRESENTING: 8512
PLATELET # BLD AUTO: 191 K/UL (ref 150–450)
PMV BLD AUTO: 9.3 FL (ref 9.2–12.9)
POTASSIUM SERPL-SCNC: 3.9 MMOL/L (ref 3.5–5.1)
PROT SERPL-MCNC: 6.5 GM/DL (ref 6–8.4)
RBC # BLD AUTO: 5.97 M/UL (ref 4.6–6.2)
RELATIVE EOSINOPHIL (SMH): 3.4 % (ref 0–8)
RELATIVE LYMPHOCYTE (SMH): 24.9 % (ref 18–48)
RELATIVE MONOCYTE (SMH): 9.1 % (ref 4–15)
RELATIVE NEUTROPHIL (SMH): 61.6 % (ref 38–73)
SODIUM SERPL-SCNC: 139 MMOL/L (ref 136–145)
SYSTOLIC BLOOD PRESSURE: 133 MMHG
WBC # BLD AUTO: 6.74 K/UL (ref 3.9–12.7)

## 2025-04-30 PROCEDURE — 25000242 PHARM REV CODE 250 ALT 637 W/ HCPCS: Performed by: NURSE PRACTITIONER

## 2025-04-30 PROCEDURE — 93018 CV STRESS TEST I&R ONLY: CPT | Mod: ,,, | Performed by: GENERAL PRACTICE

## 2025-04-30 PROCEDURE — 36415 COLL VENOUS BLD VENIPUNCTURE: CPT | Performed by: STUDENT IN AN ORGANIZED HEALTH CARE EDUCATION/TRAINING PROGRAM

## 2025-04-30 PROCEDURE — 83735 ASSAY OF MAGNESIUM: CPT | Performed by: STUDENT IN AN ORGANIZED HEALTH CARE EDUCATION/TRAINING PROGRAM

## 2025-04-30 PROCEDURE — 63600175 PHARM REV CODE 636 W HCPCS: Performed by: INTERNAL MEDICINE

## 2025-04-30 PROCEDURE — 85025 COMPLETE CBC W/AUTO DIFF WBC: CPT | Performed by: STUDENT IN AN ORGANIZED HEALTH CARE EDUCATION/TRAINING PROGRAM

## 2025-04-30 PROCEDURE — G0378 HOSPITAL OBSERVATION PER HR: HCPCS

## 2025-04-30 PROCEDURE — 96372 THER/PROPH/DIAG INJ SC/IM: CPT | Performed by: STUDENT IN AN ORGANIZED HEALTH CARE EDUCATION/TRAINING PROGRAM

## 2025-04-30 PROCEDURE — 25000003 PHARM REV CODE 250: Performed by: STUDENT IN AN ORGANIZED HEALTH CARE EDUCATION/TRAINING PROGRAM

## 2025-04-30 PROCEDURE — A9502 TC99M TETROFOSMIN: HCPCS | Performed by: INTERNAL MEDICINE

## 2025-04-30 PROCEDURE — 93016 CV STRESS TEST SUPVJ ONLY: CPT | Mod: ,,, | Performed by: GENERAL PRACTICE

## 2025-04-30 PROCEDURE — 80053 COMPREHEN METABOLIC PANEL: CPT | Performed by: STUDENT IN AN ORGANIZED HEALTH CARE EDUCATION/TRAINING PROGRAM

## 2025-04-30 PROCEDURE — 93017 CV STRESS TEST TRACING ONLY: CPT

## 2025-04-30 PROCEDURE — 63600175 PHARM REV CODE 636 W HCPCS: Performed by: STUDENT IN AN ORGANIZED HEALTH CARE EDUCATION/TRAINING PROGRAM

## 2025-04-30 RX ORDER — FLUTICASONE PROPIONATE 50 MCG
2 SPRAY, SUSPENSION (ML) NASAL DAILY
Status: DISCONTINUED | OUTPATIENT
Start: 2025-04-30 | End: 2025-05-01 | Stop reason: HOSPADM

## 2025-04-30 RX ORDER — REGADENOSON 0.08 MG/ML
0.4 INJECTION, SOLUTION INTRAVENOUS
Status: COMPLETED | OUTPATIENT
Start: 2025-04-30 | End: 2025-04-30

## 2025-04-30 RX ADMIN — ASPIRIN 81 MG: 81 TABLET, COATED ORAL at 08:04

## 2025-04-30 RX ADMIN — FLUTICASONE PROPIONATE 100 MCG: 50 SPRAY, METERED NASAL at 04:04

## 2025-04-30 RX ADMIN — ACETAMINOPHEN 650 MG: 325 TABLET ORAL at 12:04

## 2025-04-30 RX ADMIN — MECLIZINE 25 MG: 12.5 TABLET ORAL at 12:04

## 2025-04-30 RX ADMIN — Medication 6 MG: at 08:04

## 2025-04-30 RX ADMIN — ENOXAPARIN SODIUM 40 MG: 40 INJECTION SUBCUTANEOUS at 04:04

## 2025-04-30 RX ADMIN — REGADENOSON 0.4 MG: 0.08 INJECTION, SOLUTION INTRAVENOUS at 11:04

## 2025-04-30 RX ADMIN — TETROFOSMIN 26 MILLICURIE: 1.38 INJECTION, POWDER, LYOPHILIZED, FOR SOLUTION INTRAVENOUS at 11:04

## 2025-04-30 NOTE — CARE UPDATE
04/29/25 1920   Patient Assessment/Suction   Level of Consciousness (AVPU) alert   Respiratory Effort Normal;Unlabored   Expansion/Accessory Muscles/Retractions no use of accessory muscles   Rhythm/Pattern, Respiratory no shortness of breath reported;pattern regular   PRE-TX-O2   Device (Oxygen Therapy) room air   SpO2 96 %   Pulse Oximetry Type Intermittent   $ Pulse Oximetry - Multiple Charge Pulse Oximetry - Multiple   Pulse 66   Resp 16   Positioning Supine   Positioning   Head of Bed (HOB) Positioning HOB at 15 degrees   Positioning/Transfer Devices pillows;in use   Education   $ Education Other (see comment);15 min   Respiratory Evaluation   $ Care Plan Tech Time 15 min

## 2025-04-30 NOTE — HOSPITAL COURSE
Mr. Aguilera has been monitored closely during his hospitalization. He was admited on 4/29/25 for chest pain from [facility]. He started having URI symptoms for 5 days then developed body aches, N/V and had chest pain with the vomiting. The chest pain has since subsided. He's had persistent dizziness. Troponins were trended and negative. EKG NSR with no acute ischemic changes. Echo EF 53%. Flu/covid are negative. CXR with no acute consolidation. He is scheduled for a 2 day NM stress test d/t weight which was negative for reversible ischemia. CT head is negative for acute finding. Carotid doppler with no acute stenosis noted. Unable to visualize TM d/t cerumen impaction. Suspect vertigo vs. OME is the cause of dizziness. Outpt referral to ENT. He will be discharged with meclizine, flonase, short course of oral steroids, and a dose of sudafed. Strict return prxn provided. He was seen and examined on the date of discharge.

## 2025-04-30 NOTE — SUBJECTIVE & OBJECTIVE
Interval History: no acute events overnight. Still feeling dizzy - will get CT head and carotid US. Unable to visualize TM - d/t cerumen    Review of Systems   HENT:  Positive for congestion.    Respiratory:  Negative for shortness of breath.    Cardiovascular:  Negative for chest pain.   Neurological:  Positive for dizziness.     Objective:     Vital Signs (Most Recent):  Temp: 97.3 °F (36.3 °C) (04/30/25 1215)  Pulse: 61 (04/30/25 1215)  Resp: 16 (04/30/25 1215)  BP: 129/83 (04/30/25 1215)  SpO2: 95 % (04/30/25 1215) Vital Signs (24h Range):  Temp:  [97.3 °F (36.3 °C)-98.3 °F (36.8 °C)] 97.3 °F (36.3 °C)  Pulse:  [56-71] 61  Resp:  [16] 16  SpO2:  [95 %-98 %] 95 %  BP: (106-130)/(57-83) 129/83     Weight: 131.5 kg (289 lb 14.5 oz)  Body mass index is 39.32 kg/m².    Intake/Output Summary (Last 24 hours) at 4/30/2025 1513  Last data filed at 4/30/2025 1421  Gross per 24 hour   Intake 240 ml   Output --   Net 240 ml         Physical Exam  Vitals and nursing note reviewed.   Constitutional:       Appearance: He is obese.   HENT:      Head: Normocephalic and atraumatic.      Right Ear: There is impacted cerumen.      Left Ear: There is impacted cerumen.      Nose: Congestion present.      Mouth/Throat:      Comments: Mild oropharyngeal erythema without exudate  Eyes:      Pupils: Pupils are equal, round, and reactive to light.   Cardiovascular:      Rate and Rhythm: Normal rate and regular rhythm.      Pulses: Normal pulses.      Heart sounds: Normal heart sounds.   Pulmonary:      Effort: Pulmonary effort is normal.      Breath sounds: Normal breath sounds.   Abdominal:      General: Bowel sounds are normal.      Palpations: Abdomen is soft.   Musculoskeletal:         General: Normal range of motion.      Cervical back: Normal range of motion and neck supple.   Lymphadenopathy:      Cervical: Cervical adenopathy present.   Skin:     General: Skin is warm and dry.      Capillary Refill: Capillary refill takes less  than 2 seconds.      Comments: Right first finger with abrasion, mild swelling and discoloration - site of recent MRSA infection treated at [facility]   Neurological:      General: No focal deficit present.      Mental Status: He is alert.   Psychiatric:         Mood and Affect: Mood normal.         Behavior: Behavior normal.               Significant Labs: All pertinent labs within the past 24 hours have been reviewed.  CBC:   Recent Labs   Lab 04/29/25 0442 04/30/25  0407   WBC 9.11 6.74   HGB 17.1 17.0   HCT 51.6 51.9    191     CMP:   Recent Labs   Lab 04/29/25  0442 04/30/25  0407    139   K 3.8 3.9    104   CO2 25 28   * 94   BUN 24* 28*   CREATININE 0.9 0.9   CALCIUM 9.2 8.8   PROT 7.4 6.5   ALBUMIN 4.2 3.9   BILITOT 0.4 0.5   ALKPHOS 124 99   AST 61* 26   ALT 65* 47*   ANIONGAP 11 7*     Troponin:   Recent Labs   Lab 04/29/25 0442 04/29/25  0942   TROPONINIHS <3 <3       Significant Imaging: I have reviewed all pertinent imaging results/findings within the past 24 hours.    US Carotid Bilateral  Result Date: 4/30/2025  CMS MANDATED QUALITY DATA - CAROTID - 195 All measurements and percent stenosis described below were determined using NASCET criteria or criteria similar to NASCET, as defined by the Society of Radiologists in Ultrasound Consensus Conference, Radiology, 2003 EXAMINATION: US CAROTID BILATERAL CLINICAL HISTORY: dizziness;. TECHNIQUE: Grayscale, color and spectral Doppler analysis was performed. COMPARISON: None. FINDINGS: Grayscale images show no significant plaque Peak systolic velocity in the right ICA is 62 cm/sec, with ICA/CCA ratio of 0.6. Peak systolic velocity in the  left ICA is 63 cm/sec, with ICA/CCA ratio of 0.7. Antegrade flow within the vertebral arteries     No hemodynamically significant stenosis in the extracranial carotid arteries Electronically signed by: Rochelle De La Cruz Date:    04/30/2025 Time:    13:44    Nuclear Stress Test  Addendum Date: 4/30/2025     The ECG portion of the study is negative for ischemia.   The patient reported no chest pain during the stress test.   There were no arrhythmias during stress.   The nuclear portion of this study will be reported separately.   Baseline ECG: The Baseline ECG reveals sinus rhythm. Cant r/o old anterior MI.     Result Date: 4/30/2025    The ECG portion of the study is negative for ischemia.   The patient reported no chest pain during the stress test.   There were no arrhythmias during stress.   The nuclear portion of this study will be reported separately.     Echo  Result Date: 4/29/2025    Left Ventricle: The left ventricle is normal in size. There is concentric remodeling. Unable to assess wall motion. There is low normal systolic function. Quantitated ejection fraction is 53%. There is normal diastolic function.   Right Ventricle: The right ventricle is normal in size. Systolic function is normal.   IVC/SVC: Intermediate venous pressure at 8 mmHg.     X-Ray Chest AP Portable  Result Date: 4/29/2025  EXAMINATION: XR CHEST AP PORTABLE CLINICAL HISTORY: Chest Pain; TECHNIQUE: Single frontal view of the chest was performed. COMPARISON: Chest radiograph performed 11/01/2023 FINDINGS: Monitoring leads over the chest. Cardiac contours appear grossly within normal limits Lungs essentially clear.  No definite pneumothorax or large volume pleural effusion. No acute findings in the visualized abdomen.  Osseous and soft tissue structures appear without definite acute change.     No convincing evidence of acute cardiopulmonary disease. Electronically signed by: Suleiman Terrell Date:    04/29/2025 Time:    06:59

## 2025-04-30 NOTE — PROGRESS NOTES
[facility] Medicine  Progress Note    Patient Name: David Aguilera  MRN: [id number]  Patient Class: OP- Observation   Admission Date: 4/29/2025  Length of Stay: 0 days  Attending Physician: Meghann Mandel MD  Primary Care Provider: Vitaly Woody MD        Subjective     Principal Problem:Chest pain        HPI:  Mr Aguilera is a 59-year-old male with a history of hypertension.  Over the course of 5 days, he has dealt with postnasal drip which he says has progressed into full body aches and just feeling overall fatigued.  Last night he dealt with some stressful situations concerning his family, he went to bed and awoke around 4:00 a.m. with low blood pressure, states was very dizzy, could not walk.  He became nauseated and vomited once, this was associated with chest pain.  He presented to the emergency department at [facility] and was transferred here for further evaluation.  He denies smoking or alcohol use, his follow up has no known medical history to him, his mother had a stroke in her 80s.  He denies any fevers.    Overview/Hospital Course:  Mr. Aguilera has been monitored closely during his hospitalization. He was admited on 4/29/25 for chest pain from [facility]. He started having URI symptoms for 5 days then developed body aches, N/V and had chest pain with the vomiting. The chest pain has since subsided. He's had persistent dizziness. Troponins were trended and negative. EKG NSR with no acute ischemic changes. Echo EF 53%. Flu/covid are negative. CXR with no acute consolidation. He is scheduled for a 2 day NM stress test d/t weight.    Interval History: no acute events overnight. Still feeling dizzy - will get CT head and carotid US. Unable to visualize TM - d/t cerumen    Review of Systems   HENT:  Positive for congestion.    Respiratory:  Negative for shortness of breath.    Cardiovascular:  Negative for chest pain.   Neurological:  Positive for dizziness.     Objective:      Vital Signs (Most Recent):  Temp: 97.3 °F (36.3 °C) (04/30/25 1215)  Pulse: 61 (04/30/25 1215)  Resp: 16 (04/30/25 1215)  BP: 129/83 (04/30/25 1215)  SpO2: 95 % (04/30/25 1215) Vital Signs (24h Range):  Temp:  [97.3 °F (36.3 °C)-98.3 °F (36.8 °C)] 97.3 °F (36.3 °C)  Pulse:  [56-71] 61  Resp:  [16] 16  SpO2:  [95 %-98 %] 95 %  BP: (106-130)/(57-83) 129/83     Weight: 131.5 kg (289 lb 14.5 oz)  Body mass index is 39.32 kg/m².    Intake/Output Summary (Last 24 hours) at 4/30/2025 1513  Last data filed at 4/30/2025 1421  Gross per 24 hour   Intake 240 ml   Output --   Net 240 ml         Physical Exam  Vitals and nursing note reviewed.   Constitutional:       Appearance: He is obese.   HENT:      Head: Normocephalic and atraumatic.      Right Ear: There is impacted cerumen.      Left Ear: There is impacted cerumen.      Nose: Congestion present.      Mouth/Throat:      Comments: Mild oropharyngeal erythema without exudate  Eyes:      Pupils: Pupils are equal, round, and reactive to light.   Cardiovascular:      Rate and Rhythm: Normal rate and regular rhythm.      Pulses: Normal pulses.      Heart sounds: Normal heart sounds.   Pulmonary:      Effort: Pulmonary effort is normal.      Breath sounds: Normal breath sounds.   Abdominal:      General: Bowel sounds are normal.      Palpations: Abdomen is soft.   Musculoskeletal:         General: Normal range of motion.      Cervical back: Normal range of motion and neck supple.   Lymphadenopathy:      Cervical: Cervical adenopathy present.   Skin:     General: Skin is warm and dry.      Capillary Refill: Capillary refill takes less than 2 seconds.      Comments: Right first finger with abrasion, mild swelling and discoloration - site of recent MRSA infection treated at [facility]   Neurological:      General: No focal deficit present.      Mental Status: He is alert.   Psychiatric:         Mood and Affect: Mood normal.         Behavior: Behavior normal.               Significant  Labs: All pertinent labs within the past 24 hours have been reviewed.  CBC:   Recent Labs   Lab 04/29/25 0442 04/30/25  0407   WBC 9.11 6.74   HGB 17.1 17.0   HCT 51.6 51.9    191     CMP:   Recent Labs   Lab 04/29/25  0442 04/30/25  0407    139   K 3.8 3.9    104   CO2 25 28   * 94   BUN 24* 28*   CREATININE 0.9 0.9   CALCIUM 9.2 8.8   PROT 7.4 6.5   ALBUMIN 4.2 3.9   BILITOT 0.4 0.5   ALKPHOS 124 99   AST 61* 26   ALT 65* 47*   ANIONGAP 11 7*     Troponin:   Recent Labs   Lab 04/29/25  0442 04/29/25  0942   TROPONINIHS <3 <3       Significant Imaging: I have reviewed all pertinent imaging results/findings within the past 24 hours.    US Carotid Bilateral  Result Date: 4/30/2025  CMS MANDATED QUALITY DATA - CAROTID - 195 All measurements and percent stenosis described below were determined using NASCET criteria or criteria similar to NASCET, as defined by the Society of Radiologists in Ultrasound Consensus Conference, Radiology, 2003 EXAMINATION: US CAROTID BILATERAL CLINICAL HISTORY: dizziness;. TECHNIQUE: Grayscale, color and spectral Doppler analysis was performed. COMPARISON: None. FINDINGS: Grayscale images show no significant plaque Peak systolic velocity in the right ICA is 62 cm/sec, with ICA/CCA ratio of 0.6. Peak systolic velocity in the  left ICA is 63 cm/sec, with ICA/CCA ratio of 0.7. Antegrade flow within the vertebral arteries     No hemodynamically significant stenosis in the extracranial carotid arteries Electronically signed by: Rochelle De La Cruz Date:    04/30/2025 Time:    13:44    Nuclear Stress Test  Addendum Date: 4/30/2025    The ECG portion of the study is negative for ischemia.   The patient reported no chest pain during the stress test.   There were no arrhythmias during stress.   The nuclear portion of this study will be reported separately.   Baseline ECG: The Baseline ECG reveals sinus rhythm. Cant r/o old anterior MI.     Result Date: 4/30/2025    The ECG  portion of the study is negative for ischemia.   The patient reported no chest pain during the stress test.   There were no arrhythmias during stress.   The nuclear portion of this study will be reported separately.     Echo  Result Date: 4/29/2025    Left Ventricle: The left ventricle is normal in size. There is concentric remodeling. Unable to assess wall motion. There is low normal systolic function. Quantitated ejection fraction is 53%. There is normal diastolic function.   Right Ventricle: The right ventricle is normal in size. Systolic function is normal.   IVC/SVC: Intermediate venous pressure at 8 mmHg.     X-Ray Chest AP Portable  Result Date: 4/29/2025  EXAMINATION: XR CHEST AP PORTABLE CLINICAL HISTORY: Chest Pain; TECHNIQUE: Single frontal view of the chest was performed. COMPARISON: Chest radiograph performed 11/01/2023 FINDINGS: Monitoring leads over the chest. Cardiac contours appear grossly within normal limits Lungs essentially clear.  No definite pneumothorax or large volume pleural effusion. No acute findings in the visualized abdomen.  Osseous and soft tissue structures appear without definite acute change.     No convincing evidence of acute cardiopulmonary disease. Electronically signed by: Suleiman Terrell Date:    04/29/2025 Time:    06:59          Assessment & Plan  Chest pain  Admit to telemetry   Trend troponin - negative  ASA  SL NTG PRN  Continue home meds  2 day stress   EKG prn chest pain         Essential hypertension  Patient's blood pressure range in the last 24 hours was: BP  Min: 106/57  Max: 130/81.The patient's inpatient anti-hypertensive regimen is listed below:  Current Antihypertensives       Plan  - BP is controlled, no changes needed to their regimen  - hold home anthypertensives due to hypotension today. Monitor per unit protocol.  Dizziness  Likely r/t URI   Cannot visualize TM d/t cerumen   Will get carotid doppler and CT head  Continue PRN meclizine    VTE Risk Mitigation  (From admission, onward)           Ordered     enoxaparin injection 40 mg  Every 24 hours         04/29/25 1424     IP VTE HIGH RISK PATIENT  Once         04/29/25 1424     Place sequential compression device  Until discontinued         04/29/25 1424                    Discharge Planning   ADEN: 5/1/2025     Code Status: Full Code   Medical Readiness for Discharge Date:   Discharge Plan A: Home with family                        Lucero Cardoso NP  Department of Hospital Medicine   [facility]

## 2025-04-30 NOTE — ASSESSMENT & PLAN NOTE
Likely r/t URI   Cannot visualize TM d/t cerumen   Will get carotid doppler and CT head  Continue PRN meclizine

## 2025-04-30 NOTE — PLAN OF CARE
[facility]  Initial Discharge Assessment       Primary Care Provider: Vitaly Woody MD    Admission Diagnosis: Chest pain [R07.9]    Admission Date: 4/29/2025  Expected Discharge Date: 5/1/2025    CM met with patient bedside. CM verified demographics, insurance, supports, and PCP.  CM assessed patient's needs. Patient is able to complete ADLs independently. Patient verified at home DME: none.  Patient verified No HH, No Dialysis, No Blood Thinners, and No Oxygen.     Patient verified pharmacy of choice: [facility], [place]  Patient confirmed Kinjal Lynn [phone]  will be source of transportation at the time of discharge.     Transition of Care Barriers: None    Payor: MEDICAID / Plan: [employer] / Product Type: Managed Medicaid /     Extended Emergency Contact Information  Primary Emergency Contact: SatinderKinjal singh  Mobile Phone: [phone]  Relation: Significant other  Preferred language: English   needed? No    Discharge Plan A: Home with family  Discharge Plan B: Home      [id number] [place] - [address]  [address]  [facility] [address]  Phone: [phone] Fax: [phone]      Initial Assessment (most recent)       Adult Discharge Assessment - 04/30/25 1332          Discharge Assessment    Assessment Type Discharge Planning Assessment     Confirmed/corrected address, phone number and insurance Yes     Confirmed Demographics Correct on Facesheet     Source of Information patient     Does patient/caregiver understand observation status Yes     Communicated ADEN with patient/caregiver Yes     Reason For Admission Chest pain     People in Home alone     Facility Arrived From: home     Do you expect to return to your current living situation? Yes     Do you have help at home or someone to help you manage your care at home? Yes     Who are your caregiver(s) and their phone number(s)? Kinjal Lynn [phone]      Prior to hospitilization cognitive status: Alert/Oriented;No Deficits     Current cognitive status: Alert/Oriented;No Deficits     Walking or Climbing Stairs Difficulty no     Dressing/Bathing Difficulty no     Equipment Currently Used at Home none     Readmission within 30 days? No     Patient currently being followed by outpatient case management? No     Do you currently have service(s) that help you manage your care at home? No     Do you take prescription medications? Yes     Do you have prescription coverage? Yes     Coverage Medicaid     Do you have any problems affording any of your prescribed medications? No     Is the patient taking medications as prescribed? yes     Who is going to help you get home at discharge? Kinjal Lynn [phone]     How do you get to doctors appointments? car, drives self     Are you on dialysis? No     Do you take coumadin? No     Discharge Plan A Home with family     Discharge Plan B Home     DME Needed Upon Discharge  none     Discharge Plan discussed with: Patient     Transition of Care Barriers None

## 2025-04-30 NOTE — ASSESSMENT & PLAN NOTE
Patient's blood pressure range in the last 24 hours was: BP  Min: 106/57  Max: 130/81.The patient's inpatient anti-hypertensive regimen is listed below:  Current Antihypertensives       Plan  - BP is controlled, no changes needed to their regimen  - hold home anthypertensives due to hypotension today. Monitor per unit protocol.

## 2025-04-30 NOTE — TELEPHONE ENCOUNTER
----- Message from Neha sent at 4/30/2025  2:38 PM CDT -----  Contact: [phone] Patient  Patient needs a Hosp follow up appt with their PCP only. When is the next available appointment:  Symptoms:  Discharge date: 05/01/2025Needs to be seen by: 05/08/2025Would you prefer an answer via [employer]?: Call Back Please. Thank you Comments:  Pt has medicaid. Pt is having a stress test tomorrow at [facility]  ----- Message -----  From: Neha More  Sent: 4/30/2025   2:40 PM CDT  To: Bong Haider Staff    Patient needs a Hosp follow up appt with their PCP only. When is the next available appointment:  Symptoms:  Discharge date: 05/01/2025Needs to be seen by: 05/08/2025Would you prefer an answer via [employer]?: Call Back Please. Thank you Comments:  Pt has medicaid

## 2025-04-30 NOTE — PLAN OF CARE
Problem: Adult Inpatient Plan of Care  Goal: Plan of Care Review  Outcome: Progressing  Goal: Patient-Specific Goal (Individualized)  Outcome: Progressing  Goal: Absence of Hospital-Acquired Illness or Injury  Outcome: Progressing  Goal: Optimal Comfort and Wellbeing  Outcome: Progressing  Goal: Readiness for Transition of Care  Outcome: Progressing     Problem: Wound  Goal: Optimal Coping  Outcome: Progressing  Goal: Skin Health and Integrity  Outcome: Progressing  Goal: Optimal Wound Healing  Outcome: Progressing      55

## 2025-04-30 NOTE — NURSING NOTE
Lexiscan portion of Stress Test completed without complications. Patient verbalized understanding of pre-post test instructions. Discharged from stress lab per Cardiology   4 = No change - symptoms remain essentially unchanged 4 = No change - symptoms remain essentially unchanged 4 = No change - symptoms remain essentially unchanged

## 2025-04-30 NOTE — TELEPHONE ENCOUNTER
Patient is currently in the hospital at [facility] and will call once he is discharged to schedule appointment.

## 2025-04-30 NOTE — ASSESSMENT & PLAN NOTE
Admit to telemetry   Trend troponin - negative  ASA  SL NTG PRN  Continue home meds  2 day stress   EKG prn chest pain

## 2025-05-01 VITALS
WEIGHT: 289.88 LBS | HEIGHT: 72 IN | TEMPERATURE: 97 F | HEART RATE: 70 BPM | SYSTOLIC BLOOD PRESSURE: 147 MMHG | BODY MASS INDEX: 39.26 KG/M2 | DIASTOLIC BLOOD PRESSURE: 81 MMHG | OXYGEN SATURATION: 96 % | RESPIRATION RATE: 18 BRPM

## 2025-05-01 LAB
ABSOLUTE EOSINOPHIL (SMH): 0.21 K/UL
ABSOLUTE MONOCYTE (SMH): 0.59 K/UL (ref 0.3–1)
ABSOLUTE NEUTROPHIL COUNT (SMH): 4.1 K/UL (ref 1.8–7.7)
ALBUMIN SERPL-MCNC: 3.8 G/DL (ref 3.5–5.2)
ALP SERPL-CCNC: 101 UNIT/L (ref 55–135)
ALT SERPL-CCNC: 43 UNIT/L (ref 10–44)
ANION GAP (SMH): 7 MMOL/L (ref 8–16)
AST SERPL-CCNC: 28 UNIT/L (ref 10–40)
BASOPHILS # BLD AUTO: 0.06 K/UL
BASOPHILS NFR BLD AUTO: 0.9 %
BILIRUB SERPL-MCNC: 0.5 MG/DL (ref 0.1–1)
BUN SERPL-MCNC: 31 MG/DL (ref 6–20)
CALCIUM SERPL-MCNC: 9.1 MG/DL (ref 8.7–10.5)
CHLORIDE SERPL-SCNC: 104 MMOL/L (ref 95–110)
CO2 SERPL-SCNC: 28 MMOL/L (ref 23–29)
CREAT SERPL-MCNC: 1 MG/DL (ref 0.5–1.4)
ERYTHROCYTE [DISTWIDTH] IN BLOOD BY AUTOMATED COUNT: 14.4 % (ref 11.5–14.5)
GFR SERPLBLD CREATININE-BSD FMLA CKD-EPI: >60 ML/MIN/1.73/M2
GLUCOSE SERPL-MCNC: 91 MG/DL (ref 70–110)
HCT VFR BLD AUTO: 53.1 % (ref 40–54)
HGB BLD-MCNC: 17.2 GM/DL (ref 14–18)
IMM GRANULOCYTES # BLD AUTO: 0.05 K/UL (ref 0–0.04)
IMM GRANULOCYTES NFR BLD AUTO: 0.7 % (ref 0–0.5)
LYMPHOCYTES # BLD AUTO: 1.7 K/UL (ref 1–4.8)
MAGNESIUM SERPL-MCNC: 2 MG/DL (ref 1.6–2.6)
MCH RBC QN AUTO: 28.2 PG (ref 27–31)
MCHC RBC AUTO-ENTMCNC: 32.4 G/DL (ref 32–36)
MCV RBC AUTO: 87 FL (ref 82–98)
NUCLEATED RBC (/100WBC) (SMH): 0 /100 WBC
PLATELET # BLD AUTO: 183 K/UL (ref 150–450)
PMV BLD AUTO: 8.9 FL (ref 9.2–12.9)
POTASSIUM SERPL-SCNC: 4.5 MMOL/L (ref 3.5–5.1)
PROT SERPL-MCNC: 6.4 GM/DL (ref 6–8.4)
RBC # BLD AUTO: 6.1 M/UL (ref 4.6–6.2)
RELATIVE EOSINOPHIL (SMH): 3.1 % (ref 0–8)
RELATIVE LYMPHOCYTE (SMH): 25.4 % (ref 18–48)
RELATIVE MONOCYTE (SMH): 8.8 % (ref 4–15)
RELATIVE NEUTROPHIL (SMH): 61.1 % (ref 38–73)
SODIUM SERPL-SCNC: 139 MMOL/L (ref 136–145)
WBC # BLD AUTO: 6.7 K/UL (ref 3.9–12.7)

## 2025-05-01 PROCEDURE — 85025 COMPLETE CBC W/AUTO DIFF WBC: CPT | Performed by: STUDENT IN AN ORGANIZED HEALTH CARE EDUCATION/TRAINING PROGRAM

## 2025-05-01 PROCEDURE — 80053 COMPREHEN METABOLIC PANEL: CPT | Performed by: STUDENT IN AN ORGANIZED HEALTH CARE EDUCATION/TRAINING PROGRAM

## 2025-05-01 PROCEDURE — 36415 COLL VENOUS BLD VENIPUNCTURE: CPT | Performed by: STUDENT IN AN ORGANIZED HEALTH CARE EDUCATION/TRAINING PROGRAM

## 2025-05-01 PROCEDURE — G0378 HOSPITAL OBSERVATION PER HR: HCPCS

## 2025-05-01 PROCEDURE — 83735 ASSAY OF MAGNESIUM: CPT | Performed by: STUDENT IN AN ORGANIZED HEALTH CARE EDUCATION/TRAINING PROGRAM

## 2025-05-01 PROCEDURE — 25000003 PHARM REV CODE 250: Performed by: STUDENT IN AN ORGANIZED HEALTH CARE EDUCATION/TRAINING PROGRAM

## 2025-05-01 PROCEDURE — A9502 TC99M TETROFOSMIN: HCPCS | Performed by: INTERNAL MEDICINE

## 2025-05-01 RX ORDER — FLUTICASONE PROPIONATE 50 MCG
2 SPRAY, SUSPENSION (ML) NASAL DAILY
Qty: 9.9 ML | Refills: 0 | Status: SHIPPED | OUTPATIENT
Start: 2025-05-02 | End: 2025-06-01

## 2025-05-01 RX ORDER — PSEUDOEPHEDRINE HCL 30 MG
60 TABLET ORAL ONCE
Status: DISCONTINUED | OUTPATIENT
Start: 2025-05-01 | End: 2025-05-01 | Stop reason: HOSPADM

## 2025-05-01 RX ORDER — MECLIZINE HYDROCHLORIDE 25 MG/1
25 TABLET ORAL 3 TIMES DAILY PRN
Qty: 30 TABLET | Refills: 0 | Status: SHIPPED | OUTPATIENT
Start: 2025-05-01 | End: 2025-05-11

## 2025-05-01 RX ORDER — PREDNISONE 20 MG/1
20 TABLET ORAL DAILY
Qty: 3 TABLET | Refills: 0 | Status: SHIPPED | OUTPATIENT
Start: 2025-05-01 | End: 2025-05-04

## 2025-05-01 RX ADMIN — ASPIRIN 81 MG: 81 TABLET, COATED ORAL at 09:05

## 2025-05-01 RX ADMIN — TETROFOSMIN 27.5 MILLICURIE: 0.23 INJECTION, POWDER, LYOPHILIZED, FOR SOLUTION INTRAVENOUS at 12:05

## 2025-05-01 RX ADMIN — FLUTICASONE PROPIONATE 100 MCG: 50 SPRAY, METERED NASAL at 09:05

## 2025-05-01 RX ADMIN — MECLIZINE 25 MG: 12.5 TABLET ORAL at 09:05

## 2025-05-01 NOTE — PLAN OF CARE
Pt clear for DC from case management standpoint. Discharging to home.    Patient will be transported home by significant other, Kinjal    Appointments added to AVS.    ENT and PCP will be calling you to schedule and appt.       05/01/25 5306   Final Note   Assessment Type Final Discharge Note   Anticipated Discharge Disposition Home   Hospital Resources/Appts/Education Provided Appointments scheduled and added to AVS

## 2025-05-01 NOTE — PLAN OF CARE
Inbasket message sent to Cardio office for hospital follow up, clinic information added to AVS.       05/01/25 5296   Post-Acute Status   Post-Acute Authorization Other   Hospital Resources/Appts/Education Provided Appointments scheduled and added to AVS

## 2025-05-02 NOTE — DISCHARGE SUMMARY
[facility]  Discharge Summary      Patient Name: David Aguilera  MRN: [id number]  JENNIFER: [id number]  Patient Class: OP- Observation  Admission Date: 4/29/2025  Hospital Length of Stay: 0 days  Discharge Date and Time: 5/1/2025  5:07 PM  Attending Physician: No att. providers found   Discharging Provider: Lucero Cardoso NP  Primary Care Provider: Vitaly Woody MD    Primary Care Team: Networked reference to record PCT     HPI:   Mr Aguilera is a 59-year-old male with a history of hypertension.  Over the course of 5 days, he has dealt with postnasal drip which he says has progressed into full body aches and just feeling overall fatigued.  Last night he dealt with some stressful situations concerning his family, he went to bed and awoke around 4:00 a.m. with low blood pressure, states was very dizzy, could not walk.  He became nauseated and vomited once, this was associated with chest pain.  He presented to the emergency department at [facility] and was transferred here for further evaluation.  He denies smoking or alcohol use, his follow up has no known medical history to him, his mother had a stroke in her 80s.  He denies any fevers.    * No surgery found *      Hospital Course:   Mr. Aguilera has been monitored closely during his hospitalization. He was admited on 4/29/25 for chest pain from [facility]. He started having URI symptoms for 5 days then developed body aches, N/V and had chest pain with the vomiting. The chest pain has since subsided. He's had persistent dizziness. Troponins were trended and negative. EKG NSR with no acute ischemic changes. Echo EF 53%. Flu/covid are negative. CXR with no acute consolidation. He is scheduled for a 2 day NM stress test d/t weight which was negative for reversible ischemia. CT head is negative for acute finding. Carotid doppler with no acute stenosis noted. Unable to visualize TM d/t cerumen impaction. Suspect vertigo vs. OME is the  cause of dizziness. Outpt referral to ENT. He will be discharged with meclizine, flonase, short course of oral steroids, and a dose of sudafed. Strict return prxn provided. He was seen and examined on the date of discharge.     Goals of Care Treatment Preferences:  Code Status: Full Code      SDOH Screening:  The patient declined to be screened for utility difficulties, food insecurity, transport difficulties, housing insecurity, and interpersonal safety, so no concerns could be identified this admission.     Consults:     Assessment & Plan  Chest pain  Admit to telemetry   Trend troponin - negative  ASA  SL NTG PRN  Continue home meds  2 day stress   EKG prn chest pain         Essential hypertension  Patient's blood pressure range in the last 24 hours was: No data recorded.The patient's inpatient anti-hypertensive regimen is listed below:  Current Antihypertensives       Plan  - BP is controlled, no changes needed to their regimen  - hold home anthypertensives due to hypotension today. Monitor per unit protocol.  Dizziness  Likely r/t URI   Cannot visualize TM d/t cerumen   Will get carotid doppler and CT head  Continue PRN meclizine      Final Active Diagnoses:    Diagnosis Date Noted POA    PRINCIPAL PROBLEM:  Chest pain [R07.9] 04/29/2025 Yes    Dizziness [R42] 04/30/2025 Yes    Essential hypertension [I10] 02/08/2022 Yes      Problems Resolved During this Admission:       Discharged Condition: stable    Disposition: Home or Self Care    Follow Up:   Follow-up Information       Vitaly Woody MD Follow up.    Specialty: Family Medicine  Why: They will call you to schedule a hospital follow up appt.  Contact information:  [address]  [place] [id number] [phone]               Raquel Dennis NP Follow up.    Specialty: Cardiovascular Disease  Why: Inbox message sent for hospital follow up    If you havent heard from the clinic in the next coupld of days call for a hospital follow  up  Contact information:  [address]  [address]  [place] [address]  [phone]                           Patient Instructions:      Ambulatory referral/consult to ENT   Standing Status: Future   Referral Priority: Routine Referral Type: Consultation   Referral Reason: Specialty Services Required   Requested Specialty: Otolaryngology   Number of Visits Requested: 1     Diet Adult Regular     Notify your health care provider if you experience any of the following:  temperature >100.4     Notify your health care provider if you experience any of the following:  persistent nausea and vomiting or diarrhea     Notify your health care provider if you experience any of the following:  severe uncontrolled pain     Notify your health care provider if you experience any of the following:  persistent dizziness, light-headedness, or visual disturbances     Activity as tolerated       Significant Diagnostic Studies: Labs: CMP   Recent Labs   Lab 05/01/25  0357      K 4.5      CO2 28   GLU 91   BUN 31*   CREATININE 1.0   CALCIUM 9.1   PROT 6.4   ALBUMIN 3.8   BILITOT 0.5   ALKPHOS 101   AST 28   ALT 43   ANIONGAP 7*    and CBC   Recent Labs   Lab 05/01/25  0357   WBC 6.70   HGB 17.2   HCT 53.1        NM Myocardial Perfusion Spect Multi Pharmacologic  Result Date: 5/1/2025  EXAMINATION: NM MYOCARDIAL PERFUSION SPECT MULTI PHARM CLINICAL HISTORY: Chest pain, nonspecific; TECHNIQUE: 27.5 mCi technetium 99m tetrofosmin was administered IV for the rest portion of the exam, with 26.0 mCi for the stress portion of the exam, following a 2 day protocol. The patient was stressed with regadenoson 0.4 mg IV. FINDINGS: No prior studies for comparison. There is normal and homogeneous radiotracer uptake by the left ventricular myocardium, with no photopenic defects to suggest pharmacologically induced reversible ischemia or infarct. There are no wall motion abnormalities on the gated cine images, with no abnormal  transient left ventricular dilatation on stress images. Ejection fraction is calculated at 67 %. The polar map images confirm the planar SPECT findings.     1.   No evidence of pharmacologically induced reversible ischemia or infarct. 2.   Normal left ventricular wall motion. 3.   Calculated ejection fraction of 67 %. Electronically signed by: Randy Chino Date:    05/01/2025 Time:    14:05    CT Head Without Contrast  Result Date: 4/30/2025  EXAMINATION: CT HEAD WITHOUT CONTRAST CLINICAL HISTORY: Dizziness, persistent/recurrent, cardiac or vascular cause suspected;. TECHNIQUE: CMS MANDATED QUALITY DATA - CT RADIATION - 436 All CT scans at this facility utilize dose modulation, iterative reconstruction, and/or weight based dosing when appropriate to reduce radiation dose to as low as reasonably achievable. Non infusion images were obtained from the skull base to the vertex. COMPARISON: None. FINDINGS: There is no evidence of intracranial mass, hemorrhage, or midline shift.  The ventricles and sulci are within normal limits.  There are no pathologic extra-axial fluid collections. There is no evidence of ischemic change or edema.  Cerebellum and brainstem are unremarkable. The calvarium is intact.     1. Normal CT appearance of the brain. Electronically signed by: Rochelle De La Cruz Date:    04/30/2025 Time:    16:37    US Carotid Bilateral  Result Date: 4/30/2025  CMS MANDATED QUALITY DATA - CAROTID - 195 All measurements and percent stenosis described below were determined using NASCET criteria or criteria similar to NASCET, as defined by the Society of Radiologists in Ultrasound Consensus Conference, Radiology, 2003 EXAMINATION: US CAROTID BILATERAL CLINICAL HISTORY: dizziness;. TECHNIQUE: Grayscale, color and spectral Doppler analysis was performed. COMPARISON: None. FINDINGS: Grayscale images show no significant plaque Peak systolic velocity in the right ICA is 62 cm/sec, with ICA/CCA ratio of 0.6. Peak systolic  velocity in the  left ICA is 63 cm/sec, with ICA/CCA ratio of 0.7. Antegrade flow within the vertebral arteries     No hemodynamically significant stenosis in the extracranial carotid arteries Electronically signed by: Rochelle De La Cruz Date:    04/30/2025 Time:    13:44    Nuclear Stress Test  Addendum Date: 4/30/2025    The ECG portion of the study is negative for ischemia.   The patient reported no chest pain during the stress test.   There were no arrhythmias during stress.   The nuclear portion of this study will be reported separately.   Baseline ECG: The Baseline ECG reveals sinus rhythm. Cant r/o old anterior MI.     Result Date: 4/30/2025    The ECG portion of the study is negative for ischemia.   The patient reported no chest pain during the stress test.   There were no arrhythmias during stress.   The nuclear portion of this study will be reported separately.     Echo  Result Date: 4/29/2025    Left Ventricle: The left ventricle is normal in size. There is concentric remodeling. Unable to assess wall motion. There is low normal systolic function. Quantitated ejection fraction is 53%. There is normal diastolic function.   Right Ventricle: The right ventricle is normal in size. Systolic function is normal.   IVC/SVC: Intermediate venous pressure at 8 mmHg.     X-Ray Chest AP Portable  Result Date: 4/29/2025  EXAMINATION: XR CHEST AP PORTABLE CLINICAL HISTORY: Chest Pain; TECHNIQUE: Single frontal view of the chest was performed. COMPARISON: Chest radiograph performed 11/01/2023 FINDINGS: Monitoring leads over the chest. Cardiac contours appear grossly within normal limits Lungs essentially clear.  No definite pneumothorax or large volume pleural effusion. No acute findings in the visualized abdomen.  Osseous and soft tissue structures appear without definite acute change.     No convincing evidence of acute cardiopulmonary disease. Electronically signed by: Suleiman Terrell Date:    04/29/2025  Time:    06:59        Pending Diagnostic Studies:       None           Medications:  Reconciled Home Medications:      Medication List        START taking these medications      fluticasone propionate 50 mcg/actuation nasal spray  Commonly known as: FLONASE  2 sprays (100 mcg total) by Each Nostril route once daily.     meclizine 25 mg tablet  Commonly known as: ANTIVERT  Take 1 tablet (25 mg total) by mouth 3 (three) times daily as needed for Dizziness.     predniSONE 20 MG tablet  Commonly known as: DELTASONE  Take 1 tablet (20 mg total) by mouth once daily. for 3 days            CONTINUE taking these medications      AMPHETAMINE SALT COMBO 30 MG tablet  Generic drug: dextroamphetamine-amphetamine  Take 1 tablet by mouth 3 (three) times daily.     aspirin 81 MG EC tablet  Commonly known as: ECOTRIN  Take 1 tablet (81 mg total) by mouth 2 (two) times a day. for 14 days     JATENZO 237 mg Cap  Generic drug: testosterone undecanoate  Take 237 mg by mouth 2 (two) times daily.     tadalafiL 20 MG Tab  Commonly known as: CIALIS  Take 20 mg by mouth daily as needed (ED).     telmisartan-hydrochlorothiazide 40-12.5 mg per tablet  Commonly known as: MICARDIS HCT  TAKE 1 TABLET BY MOUTH DAILY            STOP taking these medications      HYDROcodone-acetaminophen 5-325 mg per tablet  Commonly known as: NORCO     HYDROcodone-acetaminophen 7.5-325 mg per tablet  Commonly known as: NORCO              Indwelling Lines/Drains at time of discharge:   Lines/Drains/Airways       None                   Time spent on the discharge of patient: 47 minutes         Lucero Cardoso NP  Department of Hospital Medicine  [facility]

## 2025-05-06 ENCOUNTER — OFFICE VISIT (OUTPATIENT)
Dept: OTOLARYNGOLOGY | Facility: CLINIC | Age: 60
End: 2025-05-06
Payer: MEDICAID

## 2025-05-06 VITALS — BODY MASS INDEX: 38.47 KG/M2 | HEIGHT: 72 IN | WEIGHT: 284 LBS

## 2025-05-06 DIAGNOSIS — R42 VERTIGO: ICD-10-CM

## 2025-05-06 DIAGNOSIS — H81.10 BPPV (BENIGN PAROXYSMAL POSITIONAL VERTIGO), UNSPECIFIED LATERALITY: Primary | ICD-10-CM

## 2025-05-06 PROCEDURE — 99999 PR PBB SHADOW E&M-EST. PATIENT-LVL III: CPT | Mod: PBBFAC,,, | Performed by: OTOLARYNGOLOGY

## 2025-05-06 PROCEDURE — 99213 OFFICE O/P EST LOW 20 MIN: CPT | Mod: PBBFAC,PN | Performed by: OTOLARYNGOLOGY

## 2025-05-06 PROCEDURE — 99203 OFFICE O/P NEW LOW 30 MIN: CPT | Mod: S$PBB,,, | Performed by: OTOLARYNGOLOGY

## 2025-05-06 PROCEDURE — 3008F BODY MASS INDEX DOCD: CPT | Mod: CPTII,,, | Performed by: OTOLARYNGOLOGY

## 2025-05-06 PROCEDURE — 1159F MED LIST DOCD IN RCRD: CPT | Mod: CPTII,,, | Performed by: OTOLARYNGOLOGY

## 2025-05-06 PROCEDURE — 1160F RVW MEDS BY RX/DR IN RCRD: CPT | Mod: CPTII,,, | Performed by: OTOLARYNGOLOGY

## 2025-05-06 PROCEDURE — 3044F HG A1C LEVEL LT 7.0%: CPT | Mod: CPTII,,, | Performed by: OTOLARYNGOLOGY

## 2025-05-06 RX ORDER — AMOXICILLIN AND CLAVULANATE POTASSIUM 875; 125 MG/1; MG/1
1 TABLET, FILM COATED ORAL 2 TIMES DAILY
Qty: 20 TABLET | Refills: 0 | Status: SHIPPED | OUTPATIENT
Start: 2025-05-06 | End: 2025-05-16

## 2025-05-06 RX ORDER — MUPIROCIN 20 MG/G
OINTMENT TOPICAL
Qty: 1 EACH | Refills: 5 | Status: SHIPPED | OUTPATIENT
Start: 2025-05-06

## 2025-05-06 RX ORDER — DEXAMETHASONE 2 MG/1
TABLET ORAL
Qty: 5 TABLET | Refills: 0 | Status: SHIPPED | OUTPATIENT
Start: 2025-05-06

## 2025-05-06 NOTE — PROGRESS NOTES
"Subjective:       Patient ID: David Aguilera is a 59 y.o. male.    Chief Complaint: Dizziness (Patient here with c/o " dizziness ongoing for days. Dirty ears, clogged ears and sore throat. Think I'm just sick." )      Could go which would be eight days ago this gentleman set up in the middle of the night for in the morning and had sudden dramatic vertigo.  He also had some chest pain I wonder if he was having a reflux event but in any case he went to the hospital in [place] where they kept him for three days did a full cardiac workup and identified that he was not really having any cardiac issues he comes in today still having intermittent vertigo short-lived but dramatic especially when he changes positions and having green stuff when he blows his nose.          Objective:      ENT Physical Exam    His physical exam is unremarkable whose ears look fine.        Assessment:       1. BPPV (benign paroxysmal positional vertigo), unspecified laterality    2. Vertigo         Plan:          So I think that is story is consistent with positional vertigo and we have discussed the home Epley maneuvers that is sounds like his current flare-ups are relatively short-lived but that is probably appropriate to move ahead with home Epley maneuvers for both the left in the right side.        "

## 2025-08-19 ENCOUNTER — PATIENT MESSAGE (OUTPATIENT)
Dept: ADMINISTRATIVE | Facility: HOSPITAL | Age: 60
End: 2025-08-19
Payer: MEDICAID

## (undated) DEVICE — WRAP SHLDR HIP ACCU THRM PACK

## (undated) DEVICE — DRAPE STERI U-SHAPED 47X51IN

## (undated) DEVICE — SOL IRR NACL .9% 3000ML

## (undated) DEVICE — CUTTER MENISCUS AGGRESSIVE 4.0

## (undated) DEVICE — UNDERGLOVES BIOGEL PI SIZE 8.5

## (undated) DEVICE — SEE MEDLINE ITEM 157171

## (undated) DEVICE — DRAPE INCISE IOBAN 2 23X17IN

## (undated) DEVICE — SUT MONOCRYL 3-0 PS-1

## (undated) DEVICE — PADDING WYTEX UNDRCST 6INX4YD

## (undated) DEVICE — IMPLANTABLE DEVICE
Type: IMPLANTABLE DEVICE | Site: KNEE | Status: NON-FUNCTIONAL
Removed: 2023-11-15

## (undated) DEVICE — DRAPE ORTH SPLIT 77X108IN

## (undated) DEVICE — SOL NACL IRR 3000ML

## (undated) DEVICE — SOL 9P NACL IRR PIC IL

## (undated) DEVICE — GLOVE SURG ULTRA TOUCH 7.5

## (undated) DEVICE — PADDING CAST SPECIALIST 6X4YD

## (undated) DEVICE — DRAPE STERI INSTRUMENT 1018

## (undated) DEVICE — TOGA FLYTE PEEL AWAY XLARGE

## (undated) DEVICE — YANKAUER FLEX NO VENT HI CAP

## (undated) DEVICE — SUT STRATAFIX PDS PLS 45CM

## (undated) DEVICE — SUT ETHILON 4-0 PS2 18 BLK

## (undated) DEVICE — GOWN POLY REINF BRTH SLV XL

## (undated) DEVICE — SYS CLSR DERMABOND PRINEO 22CM

## (undated) DEVICE — DRAPE U SPLIT SHEET 54X76IN

## (undated) DEVICE — SEE MEDLINE ITEM 157216

## (undated) DEVICE — GLOVE SURG ULTRA TOUCH 8

## (undated) DEVICE — ELECTRODE REM PLYHSV RETURN 9

## (undated) DEVICE — BANDAGE ESMARK ELASTIC ST 6X9

## (undated) DEVICE — COVER SURG LIGHT HANDLE

## (undated) DEVICE — GOWN POLY REINF X-LONG XL

## (undated) DEVICE — TOURNIQUET SB QC DP 34X4IN

## (undated) DEVICE — BRUSH SCRUB HIBICLENS 4%

## (undated) DEVICE — SUT ETHIBOND XTRA 5 V-37 GR

## (undated) DEVICE — SYR ONLY LUER LOCK 20CC

## (undated) DEVICE — SET IRR URLGY 2LINE UNIV SPIKE

## (undated) DEVICE — BLADE SURG CARBON STEEL #10

## (undated) DEVICE — STRAP OR TABLE 5IN X 72IN

## (undated) DEVICE — BANDAGE MATRIX HK LOOP 6IN 5YD

## (undated) DEVICE — MANIFOLD 4 PORT

## (undated) DEVICE — PACK SIRUS BASIC V SURG STRL

## (undated) DEVICE — PACK CUSTOM UNIV BASIN SLI

## (undated) DEVICE — WRAP PROTECTIVE LEG POS STRL

## (undated) DEVICE — GLOVE SURG ULTRA TOUCH 7

## (undated) DEVICE — NDL ECLIPSE SAFETY 18GX1-1/2IN

## (undated) DEVICE — BNDG COFLEX FOAM LF2 ST 6X5YD

## (undated) DEVICE — GLOVE SURG ULTRA TOUCH 8.5

## (undated) DEVICE — PIN DRILL HEADLESS TROCAR
Type: IMPLANTABLE DEVICE | Site: KNEE | Status: NON-FUNCTIONAL
Removed: 2023-11-15

## (undated) DEVICE — PROBE ABLATION RF ARTHSCP 3.5

## (undated) DEVICE — SUT STRATAFIX SPIRAL VIOLET

## (undated) DEVICE — BLADE SAG DUAL 18MMX1.27MMX90M

## (undated) DEVICE — SCRUB DYNA-HEX LIQ 4% CHG 4OZ

## (undated) DEVICE — ALCOHOL 70% ANTISEPTIC ISO 4OZ

## (undated) DEVICE — TRAY DRY SPONGE SCRUB W FOAM

## (undated) DEVICE — MIXER BONE CEMENT

## (undated) DEVICE — BLADE SURG CARBON STEEL SZ11

## (undated) DEVICE — TUBING SUC UNIV W/CONN 12FT

## (undated) DEVICE — INTERPULSE SET

## (undated) DEVICE — SPONGE BULKEE II ABSRB 6X6.75

## (undated) DEVICE — DRESSING N ADH OIL EMUL 3X3

## (undated) DEVICE — COVER TABLE 44X90 STERILE

## (undated) DEVICE — TOWEL OR DISP STRL BLUE 4/PK

## (undated) DEVICE — SOL POVIDONE SCRUB IODINE 4 OZ

## (undated) DEVICE — PACK SET UP 190 OMC-NS

## (undated) DEVICE — DRAPE THREE-QTR REINF 53X77IN

## (undated) DEVICE — APPLICATOR CHLORAPREP ORN 26ML

## (undated) DEVICE — YANKAUER OPEN TIP W/O VENT

## (undated) DEVICE — WRAP KNEE ACCU THERM GEL PACK

## (undated) DEVICE — PACK EXTREMITY ORTHOMAX

## (undated) DEVICE — LINER SUCTION 3000CC

## (undated) DEVICE — DRESSING AQUACEL AG 3.5X10IN

## (undated) DEVICE — GOWN TOGA SYS PEELWY ZIP 2 XL

## (undated) DEVICE — SUCTION FRAZIER TIP SURG 12FR

## (undated) DEVICE — SOL NACL IRR 1000ML BTL

## (undated) DEVICE — SLEEVE SCD EXPRESS KNEE MEDIUM